# Patient Record
Sex: FEMALE | Race: WHITE | Employment: PART TIME | URBAN - METROPOLITAN AREA
[De-identification: names, ages, dates, MRNs, and addresses within clinical notes are randomized per-mention and may not be internally consistent; named-entity substitution may affect disease eponyms.]

---

## 2017-01-06 ENCOUNTER — HOSPITAL ENCOUNTER (OUTPATIENT)
Dept: CT IMAGING | Age: 42
Discharge: HOME | End: 2017-01-06
Attending: UROLOGY
Payer: SUBSIDIZED

## 2017-01-06 DIAGNOSIS — N20.0 KIDNEY STONES: ICD-10-CM

## 2017-01-06 PROCEDURE — 74176 CT ABD & PELVIS W/O CONTRAST: CPT

## 2017-01-11 ENCOUNTER — ANESTHESIA EVENT (OUTPATIENT)
Dept: SURGERY | Age: 42
End: 2017-01-11
Payer: SUBSIDIZED

## 2017-01-12 ENCOUNTER — SURGERY (OUTPATIENT)
Age: 42
End: 2017-01-12

## 2017-01-12 ENCOUNTER — HOSPITAL ENCOUNTER (EMERGENCY)
Age: 42
Discharge: HOME | End: 2017-01-12
Attending: EMERGENCY MEDICINE
Payer: SUBSIDIZED

## 2017-01-12 ENCOUNTER — ANESTHESIA (OUTPATIENT)
Dept: SURGERY | Age: 42
End: 2017-01-12
Payer: SUBSIDIZED

## 2017-01-12 ENCOUNTER — HOSPITAL ENCOUNTER (OUTPATIENT)
Age: 42
Setting detail: OUTPATIENT SURGERY
Discharge: HOME | End: 2017-01-12
Attending: UROLOGY | Admitting: UROLOGY
Payer: SUBSIDIZED

## 2017-01-12 VITALS
TEMPERATURE: 98.2 F | SYSTOLIC BLOOD PRESSURE: 112 MMHG | BODY MASS INDEX: 26.05 KG/M2 | WEIGHT: 147 LBS | RESPIRATION RATE: 17 BRPM | OXYGEN SATURATION: 98 % | HEART RATE: 78 BPM | DIASTOLIC BLOOD PRESSURE: 62 MMHG | HEIGHT: 63 IN

## 2017-01-12 VITALS
SYSTOLIC BLOOD PRESSURE: 114 MMHG | DIASTOLIC BLOOD PRESSURE: 59 MMHG | HEART RATE: 85 BPM | TEMPERATURE: 98.7 F | OXYGEN SATURATION: 98 % | RESPIRATION RATE: 14 BRPM

## 2017-01-12 DIAGNOSIS — R33.9 URINARY RETENTION: Primary | ICD-10-CM

## 2017-01-12 PROCEDURE — 74011250636 HC RX REV CODE- 250/636

## 2017-01-12 PROCEDURE — C2617 STENT, NON-COR, TEM W/O DEL: HCPCS | Performed by: UROLOGY

## 2017-01-12 PROCEDURE — 77030006974 HC BSKT URET RTVR BSC -C: Performed by: UROLOGY

## 2017-01-12 PROCEDURE — 84132 ASSAY OF SERUM POTASSIUM: CPT

## 2017-01-12 PROCEDURE — 76210000063 HC OR PH I REC FIRST 0.5 HR: Performed by: UROLOGY

## 2017-01-12 PROCEDURE — 77030018832 HC SOL IRR H20 ICUM -A: Performed by: UROLOGY

## 2017-01-12 PROCEDURE — 77030020143 HC AIRWY LARYN INTUB CGAS -A: Performed by: ANESTHESIOLOGY

## 2017-01-12 PROCEDURE — 77030033647: Performed by: UROLOGY

## 2017-01-12 PROCEDURE — 76210000020 HC REC RM PH II FIRST 0.5 HR: Performed by: UROLOGY

## 2017-01-12 PROCEDURE — 99283 EMERGENCY DEPT VISIT LOW MDM: CPT | Performed by: PHYSICIAN ASSISTANT

## 2017-01-12 PROCEDURE — 51702 INSERT TEMP BLADDER CATH: CPT | Performed by: PHYSICIAN ASSISTANT

## 2017-01-12 PROCEDURE — 77030005515 HC CATH URETH FOL14 BARD -B

## 2017-01-12 PROCEDURE — 77030032490 HC SLV COMPR SCD KNE COVD -B: Performed by: UROLOGY

## 2017-01-12 PROCEDURE — 74011250636 HC RX REV CODE- 250/636: Performed by: UROLOGY

## 2017-01-12 PROCEDURE — 76010000138 HC OR TIME 0.5 TO 1 HR: Performed by: UROLOGY

## 2017-01-12 PROCEDURE — 76060000033 HC ANESTHESIA 1 TO 1.5 HR: Performed by: UROLOGY

## 2017-01-12 PROCEDURE — 74011000250 HC RX REV CODE- 250

## 2017-01-12 PROCEDURE — 77030019927 HC TBNG IRR CYSTO BAXT -A: Performed by: UROLOGY

## 2017-01-12 PROCEDURE — 74011250636 HC RX REV CODE- 250/636: Performed by: ANESTHESIOLOGY

## 2017-01-12 PROCEDURE — C1769 GUIDE WIRE: HCPCS | Performed by: UROLOGY

## 2017-01-12 PROCEDURE — 74011000250 HC RX REV CODE- 250: Performed by: UROLOGY

## 2017-01-12 DEVICE — URETERAL STENT
Type: IMPLANTABLE DEVICE | Site: URETER | Status: FUNCTIONAL
Brand: PERCUFLEX™ PLUS

## 2017-01-12 RX ORDER — SODIUM CHLORIDE, SODIUM LACTATE, POTASSIUM CHLORIDE, CALCIUM CHLORIDE 600; 310; 30; 20 MG/100ML; MG/100ML; MG/100ML; MG/100ML
100 INJECTION, SOLUTION INTRAVENOUS CONTINUOUS
Status: DISCONTINUED | OUTPATIENT
Start: 2017-01-12 | End: 2017-01-12 | Stop reason: HOSPADM

## 2017-01-12 RX ORDER — HYDROMORPHONE HYDROCHLORIDE 2 MG/ML
0.5 INJECTION, SOLUTION INTRAMUSCULAR; INTRAVENOUS; SUBCUTANEOUS
Status: DISCONTINUED | OUTPATIENT
Start: 2017-01-12 | End: 2017-01-12 | Stop reason: HOSPADM

## 2017-01-12 RX ORDER — LIDOCAINE HYDROCHLORIDE 20 MG/ML
INJECTION, SOLUTION EPIDURAL; INFILTRATION; INTRACAUDAL; PERINEURAL AS NEEDED
Status: DISCONTINUED | OUTPATIENT
Start: 2017-01-12 | End: 2017-01-12 | Stop reason: HOSPADM

## 2017-01-12 RX ORDER — MIDAZOLAM HYDROCHLORIDE 1 MG/ML
2 INJECTION, SOLUTION INTRAMUSCULAR; INTRAVENOUS ONCE
Status: DISCONTINUED | OUTPATIENT
Start: 2017-01-12 | End: 2017-01-12 | Stop reason: HOSPADM

## 2017-01-12 RX ORDER — OXYCODONE HYDROCHLORIDE 5 MG/1
5 TABLET ORAL
Status: DISCONTINUED | OUTPATIENT
Start: 2017-01-12 | End: 2017-01-12 | Stop reason: HOSPADM

## 2017-01-12 RX ORDER — PHENAZOPYRIDINE HYDROCHLORIDE 200 MG/1
200 TABLET, FILM COATED ORAL
Qty: 30 TAB | Refills: 3 | Status: SHIPPED | OUTPATIENT
Start: 2017-01-12 | End: 2017-01-15

## 2017-01-12 RX ORDER — ONDANSETRON 2 MG/ML
INJECTION INTRAMUSCULAR; INTRAVENOUS AS NEEDED
Status: DISCONTINUED | OUTPATIENT
Start: 2017-01-12 | End: 2017-01-12 | Stop reason: HOSPADM

## 2017-01-12 RX ORDER — HYDROCODONE BITARTRATE AND ACETAMINOPHEN 5; 325 MG/1; MG/1
1-2 TABLET ORAL
Qty: 25 TAB | Refills: 0 | Status: SHIPPED | OUTPATIENT
Start: 2017-01-12 | End: 2017-09-05

## 2017-01-12 RX ORDER — DEXAMETHASONE SODIUM PHOSPHATE 4 MG/ML
INJECTION, SOLUTION INTRA-ARTICULAR; INTRALESIONAL; INTRAMUSCULAR; INTRAVENOUS; SOFT TISSUE AS NEEDED
Status: DISCONTINUED | OUTPATIENT
Start: 2017-01-12 | End: 2017-01-12 | Stop reason: HOSPADM

## 2017-01-12 RX ORDER — FENTANYL CITRATE 50 UG/ML
100 INJECTION, SOLUTION INTRAMUSCULAR; INTRAVENOUS ONCE
Status: DISCONTINUED | OUTPATIENT
Start: 2017-01-12 | End: 2017-01-12 | Stop reason: HOSPADM

## 2017-01-12 RX ORDER — MIDAZOLAM HYDROCHLORIDE 1 MG/ML
2 INJECTION, SOLUTION INTRAMUSCULAR; INTRAVENOUS
Status: DISCONTINUED | OUTPATIENT
Start: 2017-01-12 | End: 2017-01-12 | Stop reason: HOSPADM

## 2017-01-12 RX ORDER — CEFAZOLIN SODIUM IN 0.9 % NACL 2 G/50 ML
2 INTRAVENOUS SOLUTION, PIGGYBACK (ML) INTRAVENOUS ONCE
Status: COMPLETED | OUTPATIENT
Start: 2017-01-12 | End: 2017-01-12

## 2017-01-12 RX ORDER — FENTANYL CITRATE 50 UG/ML
INJECTION, SOLUTION INTRAMUSCULAR; INTRAVENOUS AS NEEDED
Status: DISCONTINUED | OUTPATIENT
Start: 2017-01-12 | End: 2017-01-12 | Stop reason: HOSPADM

## 2017-01-12 RX ORDER — BUPIVACAINE HYDROCHLORIDE 5 MG/ML
INJECTION, SOLUTION EPIDURAL; INTRACAUDAL AS NEEDED
Status: DISCONTINUED | OUTPATIENT
Start: 2017-01-12 | End: 2017-01-12 | Stop reason: HOSPADM

## 2017-01-12 RX ORDER — LIDOCAINE HYDROCHLORIDE 10 MG/ML
0.1 INJECTION INFILTRATION; PERINEURAL AS NEEDED
Status: DISCONTINUED | OUTPATIENT
Start: 2017-01-12 | End: 2017-01-12 | Stop reason: HOSPADM

## 2017-01-12 RX ORDER — PROPOFOL 10 MG/ML
INJECTION, EMULSION INTRAVENOUS AS NEEDED
Status: DISCONTINUED | OUTPATIENT
Start: 2017-01-12 | End: 2017-01-12 | Stop reason: HOSPADM

## 2017-01-12 RX ORDER — GLYCOPYRROLATE 0.2 MG/ML
INJECTION INTRAMUSCULAR; INTRAVENOUS AS NEEDED
Status: DISCONTINUED | OUTPATIENT
Start: 2017-01-12 | End: 2017-01-12 | Stop reason: HOSPADM

## 2017-01-12 RX ADMIN — BUPIVACAINE HYDROCHLORIDE 30 ML: 5 INJECTION, SOLUTION EPIDURAL; INTRACAUDAL; PERINEURAL at 12:06

## 2017-01-12 RX ADMIN — CEFAZOLIN 2 G: 1 INJECTION, POWDER, FOR SOLUTION INTRAMUSCULAR; INTRAVENOUS; PARENTERAL at 11:35

## 2017-01-12 RX ADMIN — PROPOFOL 300 MG: 10 INJECTION, EMULSION INTRAVENOUS at 11:31

## 2017-01-12 RX ADMIN — SODIUM CHLORIDE, SODIUM LACTATE, POTASSIUM CHLORIDE, AND CALCIUM CHLORIDE 100 ML/HR: 600; 310; 30; 20 INJECTION, SOLUTION INTRAVENOUS at 11:00

## 2017-01-12 RX ADMIN — GLYCOPYRROLATE 0.2 MG: 0.2 INJECTION INTRAMUSCULAR; INTRAVENOUS at 11:56

## 2017-01-12 RX ADMIN — DEXAMETHASONE SODIUM PHOSPHATE 10 MG: 4 INJECTION, SOLUTION INTRA-ARTICULAR; INTRALESIONAL; INTRAMUSCULAR; INTRAVENOUS; SOFT TISSUE at 11:37

## 2017-01-12 RX ADMIN — FENTANYL CITRATE 50 MCG: 50 INJECTION, SOLUTION INTRAMUSCULAR; INTRAVENOUS at 11:57

## 2017-01-12 RX ADMIN — ONDANSETRON 4 MG: 2 INJECTION INTRAMUSCULAR; INTRAVENOUS at 12:13

## 2017-01-12 RX ADMIN — LIDOCAINE HYDROCHLORIDE 100 MG: 20 INJECTION, SOLUTION EPIDURAL; INFILTRATION; INTRACAUDAL; PERINEURAL at 11:31

## 2017-01-12 RX ADMIN — FENTANYL CITRATE 50 MCG: 50 INJECTION, SOLUTION INTRAMUSCULAR; INTRAVENOUS at 11:42

## 2017-01-12 NOTE — ED TRIAGE NOTES
Reports had kidney stones removed this AM by Dr. Jose Goel and has not urinated since 10:30. Had stents placed as well.

## 2017-01-12 NOTE — DISCHARGE INSTRUCTIONS
CYSTOSCOPY    ACTIVITY   · As tolerated and as directed by your doctor. · Bathe or shower as directed by your doctor. DIET  · Clear liquids until no nausea or vomiting; then light diet for the first day. · Drink plenty of liquids. At least 8 glasses of water to help flush out bladder. Limit amount of caffeine. · Advance to regular diet on second day, unless your doctor orders otherwise. · If nausea and vomiting continues, call your doctor. PAIN  · Take pain medication as directed by your doctor. · Call your doctor if pain is NOT relieved by medication. · DO NOT take aspirin or blood thinners until directed by your doctor. CALL YOUR DOCTOR IF  · Expect blood-tinged urine. Call your doctor if it lasts more than 72 hours or if you cannot see through the urine. · Temperature of 101 degrees or above. · Unable to empty bladder. AFTER ANESTHESIA  · For the next 24 hours: DO NOT Drive, drink alcoholic beverages, or Make important decisions. · Be aware of dizziness following anesthesia and while taking pain medications    APPOINTMENT DATE/ TIME    YOUR DOCTOR'S PHONE NUMBER      DISCHARGE SUMMARY from Nurse    PATIENT INSTRUCTIONS:    After general anesthesia or intravenous sedation, for 24 hours or while taking prescription Narcotics:  · Limit your activities  · Do not drive and operate hazardous machinery  · Do not make important personal or business decisions  · Do  not drink alcoholic beverages  · If you have not urinated within 8 hours after discharge, please contact your surgeon on call. *  Please give a list of your current medications to your Primary Care Provider. *  Please update this list whenever your medications are discontinued, doses are      changed, or new medications (including over-the-counter products) are added. *  Please carry medication information at all times in case of emergency situations.       These are general instructions for a healthy lifestyle:    No smoking/ No tobacco products/ Avoid exposure to second hand smoke    Surgeon General's Warning:  Quitting smoking now greatly reduces serious risk to your health. Obesity, smoking, and sedentary lifestyle greatly increases your risk for illness    A healthy diet, regular physical exercise & weight monitoring are important for maintaining a healthy lifestyle    You may be retaining fluid if you have a history of heart failure or if you experience any of the following symptoms:  Weight gain of 3 pounds or more overnight or 5 pounds in a week, increased swelling in our hands or feet or shortness of breath while lying flat in bed. Please call your doctor as soon as you notice any of these symptoms; do not wait until your next office visit. Recognize signs and symptoms of STROKE:    F-face looks uneven    A-arms unable to move or move unevenly    S-speech slurred or non-existent    T-time-call 911 as soon as signs and symptoms begin-DO NOT go       Back to bed or wait to see if you get better-TIME IS BRAIN.

## 2017-01-12 NOTE — BRIEF OP NOTE
BRIEF OPERATIVE NOTE    Date of Procedure: 1/12/2017   Preoperative Diagnosis: Ureteral stone [N20.1]  Postoperative Diagnosis: RIGHT URETERAL STONES    Procedure(s):  CYSTOSCOPY / RIGHT URETEROSCOPY/BASKET STONE EXTRACTION / RIGHT URETERAL STENT EXCHANGE  Surgeon(s) and Role:     * Yoana Luz MD - Primary            Surgical Staff:  Circ-1: Esperanza Sanchez RN  Event Time In   Incision Start 1151   Incision Close 1211     Anesthesia: General   Estimated Blood Loss: none  Specimens: * No specimens in log *   Findings: see op note   Complications: none  Implants:   Implant Name Type Inv.  Item Serial No.  Lot No. LRB No. Used Action   Saran AIRTAME [id number] -- [facility] - [id number]   Saran ReelGenie FIRM [id number] -- [facility] 19 [id number] Right 1 Implanted

## 2017-01-12 NOTE — ANESTHESIA POSTPROCEDURE EVALUATION
Post-Anesthesia Evaluation and Assessment    Patient: Evangelist Anderson MRN: [id number]  SSN: [id number]    YOB: 1975  Age: 35 y.o. Sex: female       Cardiovascular Function/Vital Signs  Visit Vitals    /59 (BP 1 Location: Right arm, BP Patient Position: Supine)    Pulse 85    Temp 37.1 °C (98.7 °F)    Resp 14    SpO2 98%       Patient is status post general anesthesia for Procedure(s):  CYSTOSCOPY / RIGHT URETEROSCOPY/BASKET STONE EXTRACTION / RIGHT URETERAL STENT EXCHANGE. Nausea/Vomiting: None    Postoperative hydration reviewed and adequate. Pain:  Pain Scale 1: Numeric (0 - 10) (01/12/17 1303)  Pain Intensity 1: 0 (01/12/17 1303)   Managed    Neurological Status:   Neuro (WDL): Within Defined Limits (01/12/17 1303)   At baseline    Mental Status and Level of Consciousness: Arousable    Pulmonary Status:   O2 Device: Room air (01/12/17 1303)   Adequate oxygenation and airway patent    Complications related to anesthesia: None    Post-anesthesia assessment completed. No concerns  No sore throat.   Signed By: Thao Zendejas MD     January 12, 2017

## 2017-01-12 NOTE — DISCHARGE INSTRUCTIONS
If you should have any change in your symptoms, worsening symptoms, or concerns return to the ER. Leave Dye catheter in place until you are seen by Urology. Urinary Retention: Care Instructions  Your Care Instructions    Urinary retention means that you aren't able to urinate. In men, it is often caused by a blockage of the urinary tract from an enlarged prostate gland. In men and women, it can also be caused by an infection or nerve damage. Or it may be a side effect of a medicine. The doctor may have drained the urine from your bladder. If you still have problems passing urine, you may need to use a catheter at home. This is used to empty your bladder until the problem can be fixed. Your doctor may put a catheter in your bladder before you go home. If so, he or she will tell you when to come back to have the catheter removed. The doctor has checked you closely. But problems can develop later. If you notice any problems or new symptoms, get medical treatment right away. Follow-up care is a key part of your treatment and safety. Be sure to make and go to all appointments, and call your doctor if you are having problems. It's also a good idea to know your test results and keep a list of the medicines you take. How can you care for yourself at home? · Take your medicines exactly as prescribed. Call your doctor if you think you are having a problem with your medicine. You will get more details on the specific medicines your doctor prescribes. · Check with your doctor before you use any over-the-counter medicines. Many cold and allergy medicines, for example, can make this problem worse. Make sure your doctor knows all of the medicines, vitamins, supplements, and herbal remedies you take. · Spread out through the day the amount of fluid you drink. Do not drink a lot at bedtime. · Avoid alcohol and caffeine.   · If you have been given a catheter, or if one is already in place, follow the instructions you were given. Always wash your hands before and after you handle the catheter. When should you call for help? Call your doctor now or seek immediate medical care if:  · You cannot urinate at all, or it is getting harder to urinate. · You have symptoms of a urinary tract infection. These may include:  ¨ Pain or burning when you urinate. ¨ A frequent need to urinate without being able to pass much urine. ¨ Pain in the flank, which is just below the rib cage and above the waist on either side of the back. ¨ Blood in your urine. ¨ A fever. Watch closely for changes in your health, and be sure to contact your doctor if:  · You have any problems with your catheter. · You do not get better as expected. Where can you learn more? Go to http://juju-bertha.info/. Enter M244 in the search box to learn more about \"Urinary Retention: Care Instructions. \"  Current as of: August 12, 2016  Content Version: 11.1  © 8341-6755 [employer]. Care instructions adapted under license by [employer] (which disclaims liability or warranty for this information). If you have questions about a medical condition or this instruction, always ask your healthcare professional. [place] [address] any warranty or liability for your use of this information. Learning About Urinary Catheter Care to Prevent Infection  What is a urinary catheter? A urinary catheter is a flexible plastic tube used to drain urine from your bladder when you can't urinate on your own. The catheter allows urine to drain from the bladder into a bag. Two types of drainage bags may be used with a urinary catheter. · A bedside bag is a large bag that you can hang on the side of your bed or on a chair. You can use it overnight or anytime you will be sitting or lying down for a long time. · A leg bag is a small bag that you can use during the day.  It is usually attached to your thigh or calf and hidden under your clothes. Having a urinary catheter increases your risk of getting a urinary tract infection. Germs may get on the catheter and cause an infection in your bladder or kidneys. The longer you have a catheter, the more likely it is that you will get an infection. You can help prevent this problem with good hygiene and careful handling of your catheter and drainage bags. How can you help prevent infection? Take care to be clean  · Always wash your hands well before and after you handle your catheter. · Clean the skin around the catheter twice a day using soap and water. Dry with a clean towel afterward. You can shower with your catheter and drainage bag in place unless your doctor told you not to. · When you clean around the catheter, check the surrounding skin for signs of infection. Look for things like pus or irritated, swollen, red, or tender skin around the catheter. Be careful with your drainage bag  · Always keep the drainage bag below the level of your bladder. This will help keep urine from flowing back into your bladder. · Check often to see that urine is flowing through the catheter into the drainage bag. · Empty the drainage bag when it is half full. This will keep it from overflowing or backing up. · When you empty the drainage bag, do not let the tubing or drain spout touch anything. Be careful with your catheter  · Do not unhook the catheter from the drain tube. That could let germs get into the tube. · Make sure that the catheter tubing does not get twisted or kinked. · Do not tug or pull on the catheter. And make sure that the drainage bag does not drag or pull on the catheter. · Do not put powder or lotion on the skin around the catheter. · Do not have sexual intercourse while wearing a catheter. How do you empty a urine drainage bag? .  If your doctor has asked you to keep a record, write down the amount of urine in the bag before you empty it.   Wash your hands before and after you touch the bag. 1. Remove the drain spout from its sleeve at the bottom of the drainage bag.  2. Open the valve on the drain spout. Let the urine flow out into the toilet or a container. Be careful not to let the tubing or drain spout touch anything. 3. After you empty the bag, wipe off any liquid on the end of the drain spout. Close the valve. Then put the drain spout back into its sleeve at the bottom of the collection bag. How do you change from a bedside bag to a leg bag? Wash your hands before and after you handle the bags. 1. Empty the bag attached to the catheter. 2. Put a clean towel under the catheter where it connects to the bag.  3. Fold and pinch the catheter closed to keep urine from leaking out. Many catheters have a clip you can use to pinch the tube closed. 4. Remove the used bag from the catheter. 5. Use an alcohol wipe to clean the tip of the leg bag. Then connect the leg bag to the catheter. 6. Strap the leg bag to your thigh or calf. Be sure the straps are not too tight. How can you clean a drainage bag? Clean your bags every day. Many people clean their bedside bag in the morning when they switch to a leg bag. At night, they attach the bedside bag and clean the leg bag. To clean a drainage ba. Remove the bag from the catheter. 2. Fill the bag with 2 parts vinegar and 3 parts water. Let it stand for 20 minutes. 3. Empty the bag, and let it air dry. When should you call for help? Call your doctor now or seek immediate medical care if:  · You have symptoms of a urinary infection. These may include:  ¨ Pain or burning when you urinate. ¨ A frequent need to urinate without being able to pass much urine. ¨ Pain in the flank, which is just below the rib cage and above the waist on either side of the back. ¨ Blood in your urine. ¨ A fever. · Your urine smells bad. · You see large blood clots in your urine.   · No urine or very little urine is flowing into the bag for 4 or more hours. Watch closely for changes in your health, and be sure to contact your doctor if:  · The area around the catheter becomes irritated, swollen, red, or tender, or there is pus draining from it. · Urine is leaking from the place where the catheter enters your body. Follow-up care is a key part of your treatment and safety. Be sure to make and go to all appointments, and call your doctor if you are having problems. It's also a good idea to know your test results and keep a list of the medicines you take. Where can you learn more? Go to http://juju-bertha.info/. Enter C910 in the search box to learn more about \"Learning About Urinary Catheter Care to Prevent Infection. \"  Current as of: August 12, 2016  Content Version: 11.1  © 0225-7075 [employer]. Care instructions adapted under license by [employer] (which disclaims liability or warranty for this information). If you have questions about a medical condition or this instruction, always ask your healthcare professional. [address] any warranty or liability for your use of this information.

## 2017-01-12 NOTE — ED PROVIDER NOTES
HPI Comments: 43-year-old  female with extensive history of ureteral lithiasis and is status post Right ureteroscopy with basket extraction of ureteral calculi with Stent exchange. Patient states procedure was uneventful she has had this performed before but states that she has not been able to urinate since the procedure. Patient states that she had placement of a different type of stent that was secured in place with any straining protruding out her urethra. Patient reports that she does have the urge to void she has just not been able to. She denies any chills, fever, nausea or vomiting. Patient states she did have general anesthesia this morning with the procedure. Patient is a 35 y.o. female presenting with inability to urinate. The history is provided by the patient. Urinary Retention    This is a new problem. The current episode started 6 to 12 hours ago. The problem occurs constantly. The problem has been gradually worsening. Associated with: SURGERY TODAY. The pain is located in the suprapubic region. The quality of the pain is pressure-like. The pain is at a severity of 3/10. The pain is moderate. Pertinent negatives include no fever, no diarrhea, no nausea, no vomiting, no constipation, no dysuria, no frequency and no back pain. Exacerbated by: ATTEMPT AT URINATION. The pain is relieved by nothing. Her past medical history is significant for kidney stones.         Past Medical History:   Diagnosis Date    Difficult intubation      12/21/16 sore throat and ulcers after intubation     Essential hypertension 11/4/2016     only when septic in 10/2016, takes atenolol for HR    GERD (gastroesophageal reflux disease)      controlled by protonix    Hiatal hernia     Ill-defined condition      kidney stones    Ill-defined condition      SVT    Kidney stone     SVT (supraventricular tachycardia)      2003 ablation    Tachycardia 11/4/2016       Past Surgical History:   Procedure Laterality Date    Hx bladder suspension  04/2016    Hx tubal ligation  2006    Hx svt ablation  2003    Fragment kidney stone/ eswl      Pr cardiac surg procedure unlist  2003     ablation    Hx urological  10/2016     lithotripsy and basket extractions multiple         Family History:   Problem Relation Age of Onset    Heart Disease Mother      congenital heart block with lalo    Diabetes Father     Hypertension Father        Social History     Social History    Marital status:      Spouse name: N/A    Number of children: N/A    Years of education: N/A     Occupational History    Not on file. Social History Main Topics    Smoking status: Never Smoker    Smokeless tobacco: Never Used    Alcohol use No    Drug use: No    Sexual activity: Not on file     Other Topics Concern    Not on file     Social History Narrative         ALLERGIES: Ciprofloxacin    Review of Systems   Constitutional: Negative for fever. Gastrointestinal: Negative for constipation, diarrhea, nausea and vomiting. Genitourinary: Positive for decreased urine volume, difficulty urinating and urgency. Negative for dysuria, flank pain and frequency. Musculoskeletal: Negative for back pain. Vitals:    01/12/17 1739   BP: 122/65   Pulse: (!) 106   Resp: 16   SpO2: 96%   Weight: 66.7 kg (147 lb)   Height: 5' 3\" (1.6 m)            Physical Exam   Constitutional: She is oriented to person, place, and time. Vital signs are normal. She appears well-developed and well-nourished. She is active. Non-toxic appearance. She does not appear ill. No distress. Cardiovascular: Normal rate, regular rhythm and normal heart sounds. Exam reveals no gallop and no friction rub. No murmur heard. Pulmonary/Chest: Effort normal and breath sounds normal. No accessory muscle usage. No respiratory distress. She has no decreased breath sounds. She has no wheezes. She has no rhonchi. Abdominal: Soft.  Normal appearance and bowel sounds are normal. She exhibits distension. There is tenderness in the suprapubic area. There is no rigidity, no rebound, no guarding and no CVA tenderness. Neurological: She is alert and oriented to person, place, and time. Skin: Skin is warm and dry. Psychiatric: She has a normal mood and affect. Her behavior is normal.   Nursing note and vitals reviewed. MDM  Number of Diagnoses or Management Options  Diagnosis management comments: Pt. Discussed with Urology attending Dr. Nichole who advised that a Dye catheter may be placed and patient may go to office tomorrow morning to have this removed. Plan discussed with patient and  who is at the bedside. They verbalize agreement to plan and disposition.        Amount and/or Complexity of Data Reviewed  Discuss the patient with other providers: yes    Risk of Complications, Morbidity, and/or Mortality  Presenting problems: moderate  Management options: low    Patient Progress  Patient progress: stable    ED Course       Procedures

## 2017-01-12 NOTE — IP AVS SNAPSHOT
Jose Luis Wang 
 
 
 [address] 
[phone] Patient: Susanne Rossi MRN: [id number] :1975 You are allergic to the following Allergen Reactions Ciprofloxacin Other (comments) \"joint pain\" Recent Documentation OB Status Smoking Status Having regular periods Never Smoker Emergency Contacts Name Discharge Info Relation Home Work Mobile Jason Fiore  Spouse [3] [phone] About your hospitalization You were admitted on:  2017 You last received care in the:  [facility] OP PACU You were discharged on:  2017 Unit phone number:  [phone] Why you were hospitalized Your primary diagnosis was:  Not on File Providers Seen During Your Hospitalizations Provider Role Specialty Primary office phone Rita Jain MD Attending Provider Urology [phone] Your Primary Care Physician (PCP) Primary Care Physician Office Phone Office Fax Dayana Beal [phone] Follow-up Information Follow up With Details Comments Contact Info Teresita Ng MD   [address] [facility] 
[phone] Rita Jain MD Follow up on 2017 2:00 [address] [facility] [address] 
[phone] Your Appointments 2017  2:00 PM EST Office Visit with Bonilla Jean-Baptiste NP [facility] Urology 52 (U [facility] UROLOGY) [address] [address]  
[phone] Current Discharge Medication List  
  
START taking these medications Dose & Instructions Dispensing Information Comments Morning Noon Evening Bedtime  
 phenazopyridine 200 mg tablet Commonly known as:  PYRIDIUM Your next dose is: Today, Tomorrow Other:  _________  Dose:  200 mg  
 Take 1 Tab by mouth three (3) times daily as needed for Pain (for painful urination) for up to 3 days. Quantity:  30 Tab Refills:  3 CONTINUE these medications which have CHANGED Dose & Instructions Dispensing Information Comments Morning Noon Evening Bedtime * HYDROcodone-acetaminophen  mg tablet Commonly known as:  Roderick De Guzman What changed:  Another medication with the same name was added. Make sure you understand how and when to take each. Your next dose is: Today, Tomorrow Other:  _________ Dose:  1 Tab Take 1 Tab by mouth every four (4) hours as needed for Pain. Max Daily Amount: 6 Tabs. Quantity:  20 Tab Refills:  0  
     
   
   
   
  
 * HYDROcodone-acetaminophen 5-325 mg per tablet Commonly known as:  Roderick De Guzman What changed: You were already taking a medication with the same name, and this prescription was added. Make sure you understand how and when to take each. Your next dose is: Today, Tomorrow Other:  _________ Dose:  1-2 Tab Take 1-2 Tabs by mouth every four (4) hours as needed for Pain. Max Daily Amount: 12 Tabs. Quantity:  25 Tab Refills:  0  
     
   
   
   
  
 * Notice: This list has 2 medication(s) that are the same as other medications prescribed for you. Read the directions carefully, and ask your doctor or other care provider to review them with you. CONTINUE these medications which have NOT CHANGED Dose & Instructions Dispensing Information Comments Morning Noon Evening Bedtime  
 atenolol 25 mg tablet Commonly known as:  TENORMIN Your next dose is: Today, Tomorrow Other:  _________ Dose:  25 mg Take 25 mg by mouth daily. Refills:  0  
     
   
   
   
  
 potassium chloride SR 10 mEq tablet Commonly known as:  KLOR-CON 10 Your next dose is: Today, Tomorrow Other:  _________ Dose:  20 mEq Take 20 mEq by mouth daily. Refills:  0 Where to Get Your Medications These medications were sent to [address] [place] [address], [address] [place] [address]  [facility] [address] [address] Phone:  [phone] phenazopyridine 200 mg tablet Information on where to get these meds will be given to you by the nurse or doctor. ! Ask your nurse or doctor about these medications HYDROcodone-acetaminophen 5-325 mg per tablet Discharge Instructions CYSTOSCOPY 
 
ACTIVITY · As tolerated and as directed by your doctor. · Bathe or shower as directed by your doctor. DIET · Clear liquids until no nausea or vomiting; then light diet for the first day. · Drink plenty of liquids. At least 8 glasses of water to help flush out bladder. Limit amount of caffeine. · Advance to regular diet on second day, unless your doctor orders otherwise. · If nausea and vomiting continues, call your doctor. PAIN 
· Take pain medication as directed by your doctor. · Call your doctor if pain is NOT relieved by medication. · DO NOT take aspirin or blood thinners until directed by your doctor. CALL YOUR DOCTOR IF 
· Expect blood-tinged urine. Call your doctor if it lasts more than 72 hours or if you cannot see through the urine. · Temperature of 101 degrees or above. · Unable to empty bladder. AFTER ANESTHESIA · For the next 24 hours: DO NOT Drive, drink alcoholic beverages, or Make important decisions. · Be aware of dizziness following anesthesia and while taking pain medications APPOINTMENT DATE/ TIME 
 
YOUR DOCTOR'S PHONE NUMBER 
 
 
DISCHARGE SUMMARY from Nurse PATIENT INSTRUCTIONS: 
 
After general anesthesia or intravenous sedation, for 24 hours or while taking prescription Narcotics: · Limit your activities · Do not drive and operate hazardous machinery · Do not make important personal or business decisions · Do  not drink alcoholic beverages · If you have not urinated within 8 hours after discharge, please contact your surgeon on call. *  Please give a list of your current medications to your Primary Care Provider. *  Please update this list whenever your medications are discontinued, doses are 
    changed, or new medications (including over-the-counter products) are added. *  Please carry medication information at all times in case of emergency situations. These are general instructions for a healthy lifestyle: No smoking/ No tobacco products/ Avoid exposure to second hand smoke Surgeon General's Warning:  Quitting smoking now greatly reduces serious risk to your health. Obesity, smoking, and sedentary lifestyle greatly increases your risk for illness A healthy diet, regular physical exercise & weight monitoring are important for maintaining a healthy lifestyle You may be retaining fluid if you have a history of heart failure or if you experience any of the following symptoms:  Weight gain of 3 pounds or more overnight or 5 pounds in a week, increased swelling in our hands or feet or shortness of breath while lying flat in bed. Please call your doctor as soon as you notice any of these symptoms; do not wait until your next office visit. Recognize signs and symptoms of STROKE: 
 
F-face looks uneven A-arms unable to move or move unevenly S-speech slurred or non-existent T-time-call 911 as soon as signs and symptoms begin-DO NOT go Back to bed or wait to see if you get better-TIME IS BRAIN. Discharge Orders None Introducing [facility] & HEALTH SERVICES! Stevo Paredes introduces built.io patient portal. Now you can access parts of your medical record, email your doctor's office, and request medication refills online. 1. In your internet browser, go to [email or web address] 2. Click on the First Time User? Click Here link in the Sign In box.  You will see the New Member Sign Up page. 3. Enter your MyScienceWork Access Code exactly as it appears below. You will not need to use this code after youve completed the sign-up process. If you do not sign up before the expiration date, you must request a new code. · MyScienceWork Access Code: [email or web address] Expires: 1/24/2017  4:15 PM 
 
4. Enter the last four digits of your Social Security Number (xxxx) and Date of Birth (mm/dd/yyyy) as indicated and click Submit. You will be taken to the next sign-up page. 5. Create a MyScienceWork ID. This will be your MyScienceWork login ID and cannot be changed, so think of one that is secure and easy to remember. 6. Create a MyScienceWork password. You can change your password at any time. 7. Enter your Password Reset Question and Answer. This can be used at a later time if you forget your password. 8. Enter your e-mail address. You will receive e-mail notification when new information is available in [address] 9. Click Sign Up. You can now view and download portions of your medical record. 10. Click the Download Summary menu link to download a portable copy of your medical information. If you have questions, please visit the Frequently Asked Questions section of the MyScienceWork website. Remember, MyScienceWork is NOT to be used for urgent needs. For medical emergencies, dial 911. Now available from your iPhone and Android! General Information Please provide this summary of care documentation to your next provider. Patient Signature:  ____________________________________________________________ Date:  ____________________________________________________________  
  
[facility] Provider Signature:  ____________________________________________________________ Date:  ____________________________________________________________

## 2017-01-12 NOTE — OP NOTES
[address]   OPERATIVE REPORT       Name:  Beatriz Camejo   MR#:  [id number]   :  1975   Account #:  [de-identified]   Date of Adm:  2017       DATE OF PROCEDURE: 2017    PREOPERATIVE DIAGNOSIS: Right ureteral calculi. POSTOPERATIVE DIAGNOSIS: Right ureteral calculi. PROCEDURE:   1. Right ureteroscopy with basket extraction of ureteral   calculi. 2. Stent exchange. SURGEON: Hilario Doty. Virginia Solis MD    FINDINGS: Multiple stones in the right mid ureter with the   largest measuring about 4 mm in diameter. DESCRIPTION OF PROCEDURE: The patient was given a general   anesthetic, placed in the dorsal lithotomy position. She was   prepped and draped in sterile fashion. Urethroscopy was   performed and showed a well-placed double-J stent on the right   side. No evidence of any stones. A 23-Khmer cystoscope was advanced into the urethra using the   video camera and 30 degree lens. The stent was seen protruding   from the right ureteral orifice. The grasping forceps was used   to grasp the end of the stent, it was pulled out of the urethral   meatus. Using fluoroscopy, a 0.038 floppy tip guidewire was then   passed up the right ureter through the lumen of the stent. The   stent was removed, leaving the guidewire in place. A 6.5-Khmer semi-rigid ureteroscope was passed alongside the   guidewire. In the right mid ureter at the inferior border of the   sacroiliac joint, there appears to be several stones. The   largest stone measures about 4 mm in diameter and there were   several other smaller stones. I used a filiform tipped wire basket to attempt to remove the   largest stone. Initially all the stones became contained within   the basket and there was some resistance initially to removing   the basket. I carefully tried to maneuver the basket, so that   the stones would come out of the basket.  At this point, made   preparations to perform laser lithotripsy because the basket   could not initially be removed. While obtaining the laser fiber, I was able to then twist the   basket. At this point, the basket with the contained stones was   able to be removed from the ureter without any trauma. There   were about 4 stones with the largest 4 mm, the other ones were   about 1-2 mm in diameter. I ran the ureteroscope back up the ureter, the wire was in good   position. There was some mild edema of the right mid ureter   where the stones were, but no evidence of any mucosal trauma. At   this point, the wire was backloaded into the cystoscope and a 7-  [place] 24-cm long double-J stent was passed successfully and   left in good position. Suture was left attached to the distal   end of the stent and left protruding from the urethral meatus. We instilled Marcaine into the bladder prior to removing the   scope. The patient tolerated this well. PLAN: She will be discharged home, return to the office in 1   week for KUB and stent removal by the nurse practitioner. The   patient has a stent with suture.         MD Mercy Baeza / Milagros Gastelum   D:  01/12/2017   12:32   T:  01/12/2017   13:10   Job #:  [id number]

## 2017-01-12 NOTE — PERIOP NOTES
Discharge instructions given to spouse. Verbalized understanding and opportunity for questions was given. Dr Ford Fails okay to discharge at this time. Pt and belongings taken via wheelchair to car.

## 2017-01-12 NOTE — ANESTHESIA PREPROCEDURE EVALUATION
Anesthetic History     Increased risk of difficult airway          Review of Systems / Medical History  Patient summary reviewed, nursing notes reviewed and pertinent labs reviewed    Pulmonary  Within defined limits                 Neuro/Psych   Within defined limits           Cardiovascular    Hypertension        Dysrhythmias : SVT      Exercise tolerance: >4 METS  Comments: SVT ablation about 13 years ago   GI/Hepatic/Renal     GERD: well controlled    Renal disease: stones  Hiatal hernia (asymptomatic)     Endo/Other  Within defined limits           Other Findings            Physical Exam    Airway  Mallampati: II  TM Distance: 4 - 6 cm  Neck ROM: normal range of motion   Mouth opening: Normal     Cardiovascular    Rhythm: regular           Dental  No notable dental hx       Pulmonary  Breath sounds clear to auscultation               Abdominal         Other Findings            Anesthetic Plan    ASA: 2  Anesthesia type: general          Induction: Intravenous  Anesthetic plan and risks discussed with: Patient and Spouse      Several GAs with LMA in past.  After surgery 12/16, pt noted bruising and ulcers in back of throat. No mention of any problems. Will be especially gentle today.

## 2017-01-13 LAB — POTASSIUM BLD-SCNC: 3.9 MMOL/L (ref 3.5–5.1)

## 2017-01-13 NOTE — ED NOTES
I have reviewed discharge instructions with the patient. The patient verbalized understanding. No questions when given opportunity to ask. Patient Ambulatory out of ED with steady gait in NAD with . esign not available.

## 2017-09-13 ENCOUNTER — APPOINTMENT (OUTPATIENT)
Dept: CT IMAGING | Age: 42
DRG: 872 | End: 2017-09-13
Attending: EMERGENCY MEDICINE
Payer: SUBSIDIZED

## 2017-09-13 ENCOUNTER — HOSPITAL ENCOUNTER (INPATIENT)
Age: 42
LOS: 5 days | Discharge: HOME | DRG: 872 | End: 2017-09-18
Attending: EMERGENCY MEDICINE | Admitting: UROLOGY
Payer: SUBSIDIZED

## 2017-09-13 DIAGNOSIS — N10 ACUTE PYELONEPHRITIS: ICD-10-CM

## 2017-09-13 DIAGNOSIS — N13.2 URETERAL STONE WITH HYDRONEPHROSIS: Primary | ICD-10-CM

## 2017-09-13 PROBLEM — N20.1 LEFT URETERAL STONE: Status: ACTIVE | Noted: 2017-09-13

## 2017-09-13 LAB
ALBUMIN SERPL-MCNC: 3.8 G/DL (ref 3.5–5)
ALBUMIN/GLOB SERPL: 0.9 {RATIO} (ref 1.2–3.5)
ALP SERPL-CCNC: 102 U/L (ref 50–136)
ALT SERPL-CCNC: 20 U/L (ref 12–65)
ANION GAP SERPL CALC-SCNC: 10 MMOL/L (ref 7–16)
AST SERPL-CCNC: 16 U/L (ref 15–37)
BACTERIA URNS QL MICRO: ABNORMAL /HPF
BASOPHILS # BLD: 0 K/UL (ref 0–0.2)
BASOPHILS NFR BLD: 0 % (ref 0–2)
BILIRUB SERPL-MCNC: 0.5 MG/DL (ref 0.2–1.1)
BUN SERPL-MCNC: 19 MG/DL (ref 6–23)
CALCIUM SERPL-MCNC: 9.3 MG/DL (ref 8.3–10.4)
CASTS URNS QL MICRO: 0 /LPF
CHLORIDE SERPL-SCNC: 103 MMOL/L (ref 98–107)
CO2 SERPL-SCNC: 24 MMOL/L (ref 21–32)
CREAT SERPL-MCNC: 1.39 MG/DL (ref 0.6–1)
CRYSTALS URNS QL MICRO: ABNORMAL /LPF
DIFFERENTIAL METHOD BLD: ABNORMAL
EOSINOPHIL # BLD: 0 K/UL (ref 0–0.8)
EOSINOPHIL NFR BLD: 0 % (ref 0.5–7.8)
EPI CELLS #/AREA URNS HPF: ABNORMAL /HPF
ERYTHROCYTE [DISTWIDTH] IN BLOOD BY AUTOMATED COUNT: 12.2 % (ref 11.9–14.6)
GLOBULIN SER CALC-MCNC: 4.3 G/DL (ref 2.3–3.5)
GLUCOSE SERPL-MCNC: 116 MG/DL (ref 65–100)
HCG UR QL: NEGATIVE
HCT VFR BLD AUTO: 39.5 % (ref 35.8–46.3)
HGB BLD-MCNC: 14 G/DL (ref 11.7–15.4)
IMM GRANULOCYTES # BLD: 0 K/UL (ref 0–0.5)
IMM GRANULOCYTES NFR BLD: 0.2 % (ref 0–5)
LACTATE BLD-SCNC: 1.4 MMOL/L (ref 0.5–1.9)
LYMPHOCYTES # BLD: 0.5 K/UL (ref 0.5–4.6)
LYMPHOCYTES NFR BLD: 3 % (ref 13–44)
MCH RBC QN AUTO: 31.3 PG (ref 26.1–32.9)
MCHC RBC AUTO-ENTMCNC: 35.4 G/DL (ref 31.4–35)
MCV RBC AUTO: 88.4 FL (ref 79.6–97.8)
MONOCYTES # BLD: 1 K/UL (ref 0.1–1.3)
MONOCYTES NFR BLD: 7 % (ref 4–12)
MUCOUS THREADS URNS QL MICRO: 0 /LPF
NEUTS SEG # BLD: 13 K/UL (ref 1.7–8.2)
NEUTS SEG NFR BLD: 90 % (ref 43–78)
OTHER OBSERVATIONS,UCOM: ABNORMAL
PLATELET # BLD AUTO: 205 K/UL (ref 150–450)
PMV BLD AUTO: 10.4 FL (ref 10.8–14.1)
POTASSIUM SERPL-SCNC: 3.7 MMOL/L (ref 3.5–5.1)
PROCALCITONIN SERPL-MCNC: <0.1 NG/ML
PROT SERPL-MCNC: 8.1 G/DL (ref 6.3–8.2)
RBC # BLD AUTO: 4.47 M/UL (ref 4.05–5.25)
RBC #/AREA URNS HPF: ABNORMAL /HPF
SODIUM SERPL-SCNC: 137 MMOL/L (ref 136–145)
WBC # BLD AUTO: 14.6 K/UL (ref 4.3–11.1)
WBC URNS QL MICRO: ABNORMAL /HPF

## 2017-09-13 PROCEDURE — 80053 COMPREHEN METABOLIC PANEL: CPT | Performed by: EMERGENCY MEDICINE

## 2017-09-13 PROCEDURE — 87077 CULTURE AEROBIC IDENTIFY: CPT | Performed by: EMERGENCY MEDICINE

## 2017-09-13 PROCEDURE — 87040 BLOOD CULTURE FOR BACTERIA: CPT | Performed by: EMERGENCY MEDICINE

## 2017-09-13 PROCEDURE — 74011250637 HC RX REV CODE- 250/637: Performed by: EMERGENCY MEDICINE

## 2017-09-13 PROCEDURE — 83605 ASSAY OF LACTIC ACID: CPT

## 2017-09-13 PROCEDURE — 99284 EMERGENCY DEPT VISIT MOD MDM: CPT | Performed by: EMERGENCY MEDICINE

## 2017-09-13 PROCEDURE — 96361 HYDRATE IV INFUSION ADD-ON: CPT | Performed by: EMERGENCY MEDICINE

## 2017-09-13 PROCEDURE — 81015 MICROSCOPIC EXAM OF URINE: CPT | Performed by: EMERGENCY MEDICINE

## 2017-09-13 PROCEDURE — 87186 SC STD MICRODIL/AGAR DIL: CPT | Performed by: EMERGENCY MEDICINE

## 2017-09-13 PROCEDURE — 96375 TX/PRO/DX INJ NEW DRUG ADDON: CPT | Performed by: EMERGENCY MEDICINE

## 2017-09-13 PROCEDURE — 87086 URINE CULTURE/COLONY COUNT: CPT | Performed by: EMERGENCY MEDICINE

## 2017-09-13 PROCEDURE — 65270000029 HC RM PRIVATE

## 2017-09-13 PROCEDURE — 81025 URINE PREGNANCY TEST: CPT | Performed by: EMERGENCY MEDICINE

## 2017-09-13 PROCEDURE — 74011250636 HC RX REV CODE- 250/636: Performed by: EMERGENCY MEDICINE

## 2017-09-13 PROCEDURE — 87205 SMEAR GRAM STAIN: CPT | Performed by: EMERGENCY MEDICINE

## 2017-09-13 PROCEDURE — 96365 THER/PROPH/DIAG IV INF INIT: CPT | Performed by: EMERGENCY MEDICINE

## 2017-09-13 PROCEDURE — 87088 URINE BACTERIA CULTURE: CPT | Performed by: EMERGENCY MEDICINE

## 2017-09-13 PROCEDURE — 85025 COMPLETE CBC W/AUTO DIFF WBC: CPT | Performed by: EMERGENCY MEDICINE

## 2017-09-13 PROCEDURE — 74011000258 HC RX REV CODE- 258: Performed by: EMERGENCY MEDICINE

## 2017-09-13 PROCEDURE — 84145 PROCALCITONIN (PCT): CPT | Performed by: EMERGENCY MEDICINE

## 2017-09-13 PROCEDURE — 74176 CT ABD & PELVIS W/O CONTRAST: CPT

## 2017-09-13 RX ORDER — ACETAMINOPHEN 500 MG
1000 TABLET ORAL
Status: COMPLETED | OUTPATIENT
Start: 2017-09-13 | End: 2017-09-13

## 2017-09-13 RX ORDER — ONDANSETRON 2 MG/ML
4 INJECTION INTRAMUSCULAR; INTRAVENOUS
Status: COMPLETED | OUTPATIENT
Start: 2017-09-13 | End: 2017-09-13

## 2017-09-13 RX ORDER — HYDROMORPHONE HYDROCHLORIDE 1 MG/ML
1 INJECTION, SOLUTION INTRAMUSCULAR; INTRAVENOUS; SUBCUTANEOUS
Status: COMPLETED | OUTPATIENT
Start: 2017-09-13 | End: 2017-09-13

## 2017-09-13 RX ADMIN — ONDANSETRON 4 MG: 2 INJECTION INTRAMUSCULAR; INTRAVENOUS at 21:25

## 2017-09-13 RX ADMIN — ACETAMINOPHEN 1000 MG: 500 TABLET, FILM COATED ORAL at 20:40

## 2017-09-13 RX ADMIN — HYDROMORPHONE HYDROCHLORIDE 1 MG: 1 INJECTION, SOLUTION INTRAMUSCULAR; INTRAVENOUS; SUBCUTANEOUS at 21:24

## 2017-09-13 RX ADMIN — CEFTRIAXONE SODIUM 1 G: 1 INJECTION, POWDER, FOR SOLUTION INTRAMUSCULAR; INTRAVENOUS at 22:28

## 2017-09-13 RX ADMIN — SODIUM CHLORIDE 1000 ML: 900 INJECTION, SOLUTION INTRAVENOUS at 21:24

## 2017-09-13 NOTE — ED TRIAGE NOTES
Pt complains of left flank pain. States she was seen by urology on Tuesday. States she called PCP and told to come to ED.  Pt is febrile in triage

## 2017-09-13 NOTE — IP AVS SNAPSHOT
Brittni Wright 
 
 
 [facility] 
[phone] Patient: Chanda Campuzano MRN: [id number] :1975 You are allergic to the following Allergen Reactions Ciprofloxacin Other (comments) \"joint pain\" Immunizations Administered for This Admission Name Date Influenza Vaccine (Quad) PF  Deferred () Recent Documentation Height Weight BMI OB Status Smoking Status 1.6 m 68.9 kg 26.93 kg/m2 Having regular periods Never Smoker Unresulted Labs Order Current Status CULTURE, BLOOD Preliminary result CULTURE, BLOOD Preliminary result Emergency Contacts Name Discharge Info Relation Home Work Mobile Jason Fiore  Spouse [3] [phone] About your hospitalization You were [facility] on:  2017 [facility] You were discharged on:  2017 Unit phone number:  [phone] Why you were hospitalized Your primary diagnosis was:  Left Ureteral Stone Providers Seen During Your Hospitalizations Provider Role Specialty Primary office phone Reggie Staples MD Attending Provider Emergency Medicine [phone] Jacob Irvin MD Attending Provider Urology [phone] Your Primary Care Physician (PCP) Primary Care Physician Office Phone Office Fax Marge Plump [phone] Follow-up Information Follow up With Details Comments Contact Info Carolann Cagle NP On 2017 at 2:20 [address] [facility] [address] [place] [address] 
[phone] Cira Todd MD   [address] [place] [phone] [address] [place] [address]  will manage PICC line [facility] [address] [facility] [address] [phone] INTRAMED PLUS  for IV antibiotics [facility] [address] 
 Jenny [facility] [address] 
[phone] Patricia Chatman MD  office will call you [address] [place] [address] 
[phone] Current Discharge Medication List  
  
ASK your doctor about these medications Dose & Instructions Dispensing Information Comments Morning Noon Evening Bedtime  
 atenolol 25 mg tablet Commonly known as:  TENORMIN Your next dose is:  9/19 Dose:  25 mg Take 25 mg by mouth daily. Refills:  0  
     
   
   
   
  
 buPROPion  mg SR tablet Commonly known as:  Lavella Bugler Your next dose is:  9/19 Dose:  150 mg Take 150 mg by mouth daily. Refills:  0  
     
   
   
   
  
 potassium chloride SR 10 mEq tablet Commonly known as:  KLOR-CON 10 Your next dose is:  9/19 Dose:  20 mEq Take 20 mEq by mouth daily. Refills:  0 Discharge Instructions DISCHARGE SUMMARY from Nurse The following personal items are in your possession at time of discharge: 
 
Dental Appliances: None Home Medications: None Jewelry: None Clothing: At bedside, Footwear, Robe, [address], [address] Other Valuables: Cell Phone PATIENT INSTRUCTIONS: 
 
 
F-face looks uneven A-arms unable to move or move unevenly S-speech slurred or non-existent T-time-call 911 as soon as signs and symptoms begin-DO NOT go Back to bed or wait to see if you get better-TIME IS BRAIN. Warning Signs of HEART ATTACK Call 911 if you have these symptoms: 
? Chest discomfort.  Most heart attacks involve discomfort in the center of the chest that lasts more than a few minutes, or that goes away and comes back. It can feel like uncomfortable pressure, squeezing, fullness, or pain. ? Discomfort in other areas of the upper body. Symptoms can include pain or discomfort in one or both arms, the back, neck, jaw, or stomach. ? Shortness of breath with or without chest discomfort. ? Other signs may include breaking out in a cold sweat, nausea, or lightheadedness. Don't wait more than five minutes to call [address]! Fast action can save your life. Calling 911 is almost always the fastest way to get lifesaving treatment. Emergency Medical Services staff can begin treatment when they arrive  up to an hour sooner than if someone gets to the hospital by car. The discharge information has been reviewed with the patient. The patient verbalized understanding. Discharge medications reviewed with the patient and appropriate educational materials and side effects teaching were provided. Discharge Instructions Attachments/References SEPSIS (ENGLISH) Discharge Orders None Introducing [facility] & HEALTH SERVICES! Dear Sahil Marie: 
Thank you for requesting a ROBAUTO account. Our records indicate that you have previously registered for a ROBAUTO account but its currently inactive. Please call our ROBAUTO support line at [phone]. Additional Information If you have questions, please visit the Frequently Asked Questions section of the ROBAUTO website at [email or web address] Remember, ROBAUTO is NOT to be used for urgent needs. For medical emergencies, dial 911. Now available from your iPhone and Android! General Information Please provide this summary of care documentation to your next provider. Patient Signature:  ____________________________________________________________ Date:  ____________________________________________________________ `    
    
 Provider Signature:  ____________________________________________________________ Date:  ____________________________________________________________ More Information Sepsis: Care Instructions Your Care Instructions Sepsis is an infection that has spread throughout your body. It is a life-threatening condition and often causes extremely low blood pressure. This can lead to problems with many different organs. The cause of sepsis is not always clear, but it can happen as part of a long-term or sudden illness. Sometimes even a mild illness can lead to sepsis. Follow-up care is a key part of your treatment and safety. Be sure to make and go to all appointments, and call your doctor if you are having problems. Its also a good idea to know your test results and keep a list of the medicines you take. How can you care for yourself at home? · If your doctor prescribed antibiotics, take them as directed. Do not stop taking them just because you feel better. You need to take the full course of antibiotics. · Drink plenty of fluids, enough so that your urine is light yellow or clear like water. Choose water or caffeine-free clear liquids until you feel better. If you have kidney, heart, or liver disease and have to limit fluids, talk with your doctor before you increase your fluid intake. You can try rehydration drinks, such as Gatorade or Powerade. · Do not drink alcohol. · Eat a healthy diet. Include fruits, vegetables, and whole grains in your diet every day. · Walking is an easy way to get exercise. Gradually increase the amount you walk every day. Make sure your doctor knows that you are starting an exercise program. 
· Do not smoke or use other tobacco products. If you need help quitting, talk to your doctor about stop-smoking programs and medicines. These can increase your chances of quitting for good. When should you call for help? Call 911 anytime you think you may need emergency care. For example, call if: 
· You passed out (lost consciousness). Call your doctor now or seek immediate medical care if: 
· You have a fever or chills. · You have cool, pale, or clammy skin. · You are dizzy or lightheaded, or you feel like you may faint. · You have any new symptoms, such as a cough, pain in one part of your body, or urinary problems. Watch closely for changes in your health, and be sure to contact your doctor if: 
· You do not get better as expected. Where can you learn more? Go to http://juju-bertha.info/. Enter Q138 in the search box to learn more about \"Sepsis: Care Instructions. \" Current as of: March 20, 2017 Content Version: 11.3 © 3848-4136 Piccsy. Care instructions adapted under license by [employer] (which disclaims liability or warranty for this information). If you have questions about a medical condition or this instruction, always ask your healthcare professional. [address] any warranty or liability for your use of this information.

## 2017-09-14 ENCOUNTER — ANESTHESIA (OUTPATIENT)
Dept: SURGERY | Age: 42
DRG: 872 | End: 2017-09-14
Payer: SUBSIDIZED

## 2017-09-14 ENCOUNTER — ANESTHESIA EVENT (OUTPATIENT)
Dept: SURGERY | Age: 42
DRG: 872 | End: 2017-09-14
Payer: SUBSIDIZED

## 2017-09-14 LAB
ANION GAP SERPL CALC-SCNC: 10 MMOL/L (ref 7–16)
BUN SERPL-MCNC: 18 MG/DL (ref 6–23)
CALCIUM SERPL-MCNC: 8.3 MG/DL (ref 8.3–10.4)
CHLORIDE SERPL-SCNC: 106 MMOL/L (ref 98–107)
CO2 SERPL-SCNC: 21 MMOL/L (ref 21–32)
CREAT SERPL-MCNC: 1.51 MG/DL (ref 0.6–1)
ERYTHROCYTE [DISTWIDTH] IN BLOOD BY AUTOMATED COUNT: 12.2 % (ref 11.9–14.6)
GLUCOSE SERPL-MCNC: 177 MG/DL (ref 65–100)
HCT VFR BLD AUTO: 31.7 % (ref 35.8–46.3)
HGB BLD-MCNC: 11 G/DL (ref 11.7–15.4)
MAGNESIUM SERPL-MCNC: 1.5 MG/DL (ref 1.8–2.4)
MCH RBC QN AUTO: 30.8 PG (ref 26.1–32.9)
MCHC RBC AUTO-ENTMCNC: 34.7 G/DL (ref 31.4–35)
MCV RBC AUTO: 88.8 FL (ref 79.6–97.8)
PLATELET # BLD AUTO: 158 K/UL (ref 150–450)
PMV BLD AUTO: 10.3 FL (ref 10.8–14.1)
POTASSIUM SERPL-SCNC: 3.4 MMOL/L (ref 3.5–5.1)
RBC # BLD AUTO: 3.57 M/UL (ref 4.05–5.25)
SODIUM SERPL-SCNC: 137 MMOL/L (ref 136–145)
WBC # BLD AUTO: 22.2 K/UL (ref 4.3–11.1)

## 2017-09-14 PROCEDURE — 77030018846 HC SOL IRR STRL H20 ICUM -A: Performed by: UROLOGY

## 2017-09-14 PROCEDURE — 83735 ASSAY OF MAGNESIUM: CPT | Performed by: UROLOGY

## 2017-09-14 PROCEDURE — 87086 URINE CULTURE/COLONY COUNT: CPT | Performed by: UROLOGY

## 2017-09-14 PROCEDURE — 36415 COLL VENOUS BLD VENIPUNCTURE: CPT | Performed by: UROLOGY

## 2017-09-14 PROCEDURE — C2617 STENT, NON-COR, TEM W/O DEL: HCPCS | Performed by: UROLOGY

## 2017-09-14 PROCEDURE — 85027 COMPLETE CBC AUTOMATED: CPT | Performed by: UROLOGY

## 2017-09-14 PROCEDURE — 77030032490 HC SLV COMPR SCD KNE COVD -B: Performed by: UROLOGY

## 2017-09-14 PROCEDURE — 76010000093 HC SPECIAL PROCEDURE: Performed by: UROLOGY

## 2017-09-14 PROCEDURE — 77030007880 HC KT SPN EPDRL BBMI -B: Performed by: ANESTHESIOLOGY

## 2017-09-14 PROCEDURE — 77030034849: Performed by: UROLOGY

## 2017-09-14 PROCEDURE — 74011250636 HC RX REV CODE- 250/636

## 2017-09-14 PROCEDURE — C1769 GUIDE WIRE: HCPCS | Performed by: UROLOGY

## 2017-09-14 PROCEDURE — 74011250636 HC RX REV CODE- 250/636: Performed by: UROLOGY

## 2017-09-14 PROCEDURE — 77030019927 HC TBNG IRR CYSTO BAXT -A: Performed by: UROLOGY

## 2017-09-14 PROCEDURE — 87088 URINE BACTERIA CULTURE: CPT | Performed by: UROLOGY

## 2017-09-14 PROCEDURE — 87186 SC STD MICRODIL/AGAR DIL: CPT | Performed by: UROLOGY

## 2017-09-14 PROCEDURE — 65270000029 HC RM PRIVATE

## 2017-09-14 PROCEDURE — 0T778DZ DILATION OF LEFT URETER WITH INTRALUMINAL DEVICE, VIA NATURAL OR ARTIFICIAL OPENING ENDOSCOPIC: ICD-10-PCS | Performed by: UROLOGY

## 2017-09-14 PROCEDURE — 74011250637 HC RX REV CODE- 250/637: Performed by: ANESTHESIOLOGY

## 2017-09-14 PROCEDURE — 77030003665 HC NDL SPN BBMI -A: Performed by: ANESTHESIOLOGY

## 2017-09-14 PROCEDURE — 76060000031 HC ANESTHESIA FIRST 0.5 HR: Performed by: UROLOGY

## 2017-09-14 PROCEDURE — 74011250636 HC RX REV CODE- 250/636: Performed by: INTERNAL MEDICINE

## 2017-09-14 PROCEDURE — 76210000006 HC OR PH I REC 0.5 TO 1 HR: Performed by: UROLOGY

## 2017-09-14 PROCEDURE — 74011250637 HC RX REV CODE- 250/637: Performed by: UROLOGY

## 2017-09-14 PROCEDURE — 77030018832 HC SOL IRR H20 ICUM -A: Performed by: UROLOGY

## 2017-09-14 PROCEDURE — 74011000258 HC RX REV CODE- 258: Performed by: UROLOGY

## 2017-09-14 PROCEDURE — 80048 BASIC METABOLIC PNL TOTAL CA: CPT | Performed by: UROLOGY

## 2017-09-14 DEVICE — URETERAL STENT
Type: IMPLANTABLE DEVICE | Site: URETER | Status: FUNCTIONAL
Brand: PERCUFLEX™ PLUS

## 2017-09-14 RX ORDER — IBUPROFEN 600 MG/1
600 TABLET ORAL ONCE
Status: COMPLETED | OUTPATIENT
Start: 2017-09-14 | End: 2017-09-14

## 2017-09-14 RX ORDER — HYDROMORPHONE HYDROCHLORIDE 1 MG/ML
1 INJECTION, SOLUTION INTRAMUSCULAR; INTRAVENOUS; SUBCUTANEOUS
Status: DISCONTINUED | OUTPATIENT
Start: 2017-09-14 | End: 2017-09-18 | Stop reason: HOSPADM

## 2017-09-14 RX ORDER — IBUPROFEN 800 MG/1
800 TABLET ORAL
Status: DISCONTINUED | OUTPATIENT
Start: 2017-09-14 | End: 2017-09-18 | Stop reason: HOSPADM

## 2017-09-14 RX ORDER — MIDAZOLAM HYDROCHLORIDE 1 MG/ML
2 INJECTION, SOLUTION INTRAMUSCULAR; INTRAVENOUS
Status: CANCELLED | OUTPATIENT
Start: 2017-09-14

## 2017-09-14 RX ORDER — SODIUM CHLORIDE, SODIUM LACTATE, POTASSIUM CHLORIDE, CALCIUM CHLORIDE 600; 310; 30; 20 MG/100ML; MG/100ML; MG/100ML; MG/100ML
INJECTION, SOLUTION INTRAVENOUS
Status: DISCONTINUED | OUTPATIENT
Start: 2017-09-14 | End: 2017-09-14 | Stop reason: HOSPADM

## 2017-09-14 RX ORDER — SODIUM CHLORIDE, SODIUM LACTATE, POTASSIUM CHLORIDE, CALCIUM CHLORIDE 600; 310; 30; 20 MG/100ML; MG/100ML; MG/100ML; MG/100ML
100 INJECTION, SOLUTION INTRAVENOUS CONTINUOUS
Status: DISCONTINUED | OUTPATIENT
Start: 2017-09-14 | End: 2017-09-14 | Stop reason: HOSPADM

## 2017-09-14 RX ORDER — MIDAZOLAM HYDROCHLORIDE 1 MG/ML
2 INJECTION, SOLUTION INTRAMUSCULAR; INTRAVENOUS ONCE
Status: CANCELLED | OUTPATIENT
Start: 2017-09-14 | End: 2017-09-14

## 2017-09-14 RX ORDER — DOCUSATE SODIUM 100 MG/1
100 CAPSULE, LIQUID FILLED ORAL 2 TIMES DAILY
Status: DISCONTINUED | OUTPATIENT
Start: 2017-09-14 | End: 2017-09-18 | Stop reason: HOSPADM

## 2017-09-14 RX ORDER — VANCOMYCIN HYDROCHLORIDE
1250 EVERY 24 HOURS
Status: DISCONTINUED | OUTPATIENT
Start: 2017-09-14 | End: 2017-09-15

## 2017-09-14 RX ORDER — ONDANSETRON 2 MG/ML
4 INJECTION INTRAMUSCULAR; INTRAVENOUS
Status: DISCONTINUED | OUTPATIENT
Start: 2017-09-14 | End: 2017-09-18 | Stop reason: HOSPADM

## 2017-09-14 RX ORDER — ACETAMINOPHEN 325 MG/1
650 TABLET ORAL
Status: DISCONTINUED | OUTPATIENT
Start: 2017-09-14 | End: 2017-09-18 | Stop reason: HOSPADM

## 2017-09-14 RX ORDER — DIPHENHYDRAMINE HYDROCHLORIDE 50 MG/ML
12.5 INJECTION, SOLUTION INTRAMUSCULAR; INTRAVENOUS
Status: DISCONTINUED | OUTPATIENT
Start: 2017-09-14 | End: 2017-09-14 | Stop reason: HOSPADM

## 2017-09-14 RX ORDER — BUPROPION HYDROCHLORIDE 150 MG/1
150 TABLET, EXTENDED RELEASE ORAL
COMMUNITY
End: 2018-06-22

## 2017-09-14 RX ORDER — CHLOROPROCAINE HYDROCHLORIDE 30 MG/ML
INJECTION, SOLUTION EPIDURAL; INFILTRATION; INTRACAUDAL; PERINEURAL AS NEEDED
Status: DISCONTINUED | OUTPATIENT
Start: 2017-09-14 | End: 2017-09-14 | Stop reason: HOSPADM

## 2017-09-14 RX ORDER — ONDANSETRON 2 MG/ML
4 INJECTION INTRAMUSCULAR; INTRAVENOUS ONCE
Status: DISCONTINUED | OUTPATIENT
Start: 2017-09-14 | End: 2017-09-14 | Stop reason: HOSPADM

## 2017-09-14 RX ORDER — POTASSIUM CHLORIDE 20 MEQ/1
20 TABLET, EXTENDED RELEASE ORAL 2 TIMES DAILY
Status: COMPLETED | OUTPATIENT
Start: 2017-09-14 | End: 2017-09-14

## 2017-09-14 RX ORDER — HYDROCODONE BITARTRATE AND ACETAMINOPHEN 5; 325 MG/1; MG/1
1 TABLET ORAL
Status: DISCONTINUED | OUTPATIENT
Start: 2017-09-14 | End: 2017-09-18 | Stop reason: HOSPADM

## 2017-09-14 RX ORDER — OXYCODONE HYDROCHLORIDE 5 MG/1
5 TABLET ORAL
Status: DISCONTINUED | OUTPATIENT
Start: 2017-09-14 | End: 2017-09-14 | Stop reason: HOSPADM

## 2017-09-14 RX ORDER — FENTANYL CITRATE 50 UG/ML
100 INJECTION, SOLUTION INTRAMUSCULAR; INTRAVENOUS ONCE
Status: CANCELLED | OUTPATIENT
Start: 2017-09-14 | End: 2017-09-14

## 2017-09-14 RX ORDER — IBUPROFEN 400 MG/1
400 TABLET ORAL ONCE
Status: COMPLETED | OUTPATIENT
Start: 2017-09-14 | End: 2017-09-14

## 2017-09-14 RX ORDER — ATENOLOL 50 MG/1
25 TABLET ORAL DAILY
Status: DISCONTINUED | OUTPATIENT
Start: 2017-09-14 | End: 2017-09-18 | Stop reason: HOSPADM

## 2017-09-14 RX ORDER — ALBUTEROL SULFATE 0.83 MG/ML
2.5 SOLUTION RESPIRATORY (INHALATION) AS NEEDED
Status: DISCONTINUED | OUTPATIENT
Start: 2017-09-14 | End: 2017-09-14 | Stop reason: HOSPADM

## 2017-09-14 RX ORDER — ACETAMINOPHEN 500 MG
1000 TABLET ORAL ONCE
Status: DISCONTINUED | OUTPATIENT
Start: 2017-09-14 | End: 2017-09-14

## 2017-09-14 RX ORDER — SODIUM CHLORIDE 0.9 % (FLUSH) 0.9 %
5-10 SYRINGE (ML) INJECTION AS NEEDED
Status: DISCONTINUED | OUTPATIENT
Start: 2017-09-14 | End: 2017-09-18 | Stop reason: HOSPADM

## 2017-09-14 RX ORDER — LIDOCAINE HYDROCHLORIDE 10 MG/ML
0.1 INJECTION INFILTRATION; PERINEURAL AS NEEDED
Status: CANCELLED | OUTPATIENT
Start: 2017-09-14

## 2017-09-14 RX ORDER — NALOXONE HYDROCHLORIDE 0.4 MG/ML
0.1 INJECTION, SOLUTION INTRAMUSCULAR; INTRAVENOUS; SUBCUTANEOUS AS NEEDED
Status: DISCONTINUED | OUTPATIENT
Start: 2017-09-14 | End: 2017-09-14 | Stop reason: HOSPADM

## 2017-09-14 RX ORDER — MAGNESIUM SULFATE HEPTAHYDRATE 40 MG/ML
4 INJECTION, SOLUTION INTRAVENOUS ONCE
Status: COMPLETED | OUTPATIENT
Start: 2017-09-14 | End: 2017-09-14

## 2017-09-14 RX ORDER — HYDROMORPHONE HYDROCHLORIDE 2 MG/ML
0.5 INJECTION, SOLUTION INTRAMUSCULAR; INTRAVENOUS; SUBCUTANEOUS
Status: DISCONTINUED | OUTPATIENT
Start: 2017-09-14 | End: 2017-09-14 | Stop reason: HOSPADM

## 2017-09-14 RX ORDER — SODIUM CHLORIDE 9 MG/ML
125 INJECTION, SOLUTION INTRAVENOUS CONTINUOUS
Status: DISCONTINUED | OUTPATIENT
Start: 2017-09-14 | End: 2017-09-15

## 2017-09-14 RX ORDER — SODIUM CHLORIDE, SODIUM LACTATE, POTASSIUM CHLORIDE, CALCIUM CHLORIDE 600; 310; 30; 20 MG/100ML; MG/100ML; MG/100ML; MG/100ML
100 INJECTION, SOLUTION INTRAVENOUS CONTINUOUS
Status: CANCELLED | OUTPATIENT
Start: 2017-09-14 | End: 2017-09-15

## 2017-09-14 RX ORDER — SODIUM CHLORIDE 0.9 % (FLUSH) 0.9 %
5-10 SYRINGE (ML) INJECTION EVERY 8 HOURS
Status: DISCONTINUED | OUTPATIENT
Start: 2017-09-14 | End: 2017-09-18 | Stop reason: HOSPADM

## 2017-09-14 RX ORDER — OXYCODONE HYDROCHLORIDE 5 MG/1
10 TABLET ORAL
Status: DISCONTINUED | OUTPATIENT
Start: 2017-09-14 | End: 2017-09-14 | Stop reason: HOSPADM

## 2017-09-14 RX ORDER — CEFTRIAXONE 1 G/1
1 INJECTION, POWDER, FOR SOLUTION INTRAMUSCULAR; INTRAVENOUS EVERY 24 HOURS
Status: DISCONTINUED | OUTPATIENT
Start: 2017-09-14 | End: 2017-09-14 | Stop reason: SDUPTHER

## 2017-09-14 RX ADMIN — HYDROCODONE BITARTRATE AND ACETAMINOPHEN 1 TABLET: 5; 325 TABLET ORAL at 12:59

## 2017-09-14 RX ADMIN — POTASSIUM CHLORIDE 20 MEQ: 20 TABLET, EXTENDED RELEASE ORAL at 09:06

## 2017-09-14 RX ADMIN — HYDROMORPHONE HYDROCHLORIDE 1 MG: 1 INJECTION, SOLUTION INTRAMUSCULAR; INTRAVENOUS; SUBCUTANEOUS at 00:46

## 2017-09-14 RX ADMIN — SODIUM CHLORIDE 125 ML/HR: 900 INJECTION, SOLUTION INTRAVENOUS at 23:51

## 2017-09-14 RX ADMIN — DOCUSATE SODIUM 100 MG: 100 CAPSULE, LIQUID FILLED ORAL at 16:39

## 2017-09-14 RX ADMIN — IBUPROFEN 600 MG: 600 TABLET, FILM COATED ORAL at 05:09

## 2017-09-14 RX ADMIN — DOCUSATE SODIUM 100 MG: 100 CAPSULE, LIQUID FILLED ORAL at 08:05

## 2017-09-14 RX ADMIN — POTASSIUM CHLORIDE 20 MEQ: 20 TABLET, EXTENDED RELEASE ORAL at 16:39

## 2017-09-14 RX ADMIN — IBUPROFEN 600 MG: 200 TABLET, FILM COATED ORAL at 22:50

## 2017-09-14 RX ADMIN — HYDROCODONE BITARTRATE AND ACETAMINOPHEN 1 TABLET: 5; 325 TABLET ORAL at 16:39

## 2017-09-14 RX ADMIN — SODIUM CHLORIDE 125 ML/HR: 900 INJECTION, SOLUTION INTRAVENOUS at 01:00

## 2017-09-14 RX ADMIN — Medication 10 ML: at 23:47

## 2017-09-14 RX ADMIN — HYDROCODONE BITARTRATE AND ACETAMINOPHEN 1 TABLET: 5; 325 TABLET ORAL at 20:50

## 2017-09-14 RX ADMIN — Medication 10 ML: at 14:00

## 2017-09-14 RX ADMIN — ACETAMINOPHEN 650 MG: 325 TABLET ORAL at 01:17

## 2017-09-14 RX ADMIN — ACETAMINOPHEN 650 MG: 325 TABLET ORAL at 20:50

## 2017-09-14 RX ADMIN — CEFTRIAXONE SODIUM 1 G: 1 INJECTION, POWDER, FOR SOLUTION INTRAMUSCULAR; INTRAVENOUS at 23:46

## 2017-09-14 RX ADMIN — Medication 10 ML: at 06:00

## 2017-09-14 RX ADMIN — CHLOROPROCAINE HYDROCHLORIDE 2 ML: 30 INJECTION, SOLUTION EPIDURAL; INFILTRATION; INTRACAUDAL; PERINEURAL at 04:11

## 2017-09-14 RX ADMIN — Medication 10 ML: at 01:00

## 2017-09-14 RX ADMIN — SODIUM CHLORIDE, SODIUM LACTATE, POTASSIUM CHLORIDE, CALCIUM CHLORIDE: 600; 310; 30; 20 INJECTION, SOLUTION INTRAVENOUS at 04:07

## 2017-09-14 RX ADMIN — MAGNESIUM SULFATE HEPTAHYDRATE 4 G: 40 INJECTION, SOLUTION INTRAVENOUS at 09:06

## 2017-09-14 RX ADMIN — SODIUM CHLORIDE 125 ML/HR: 900 INJECTION, SOLUTION INTRAVENOUS at 14:16

## 2017-09-14 RX ADMIN — VANCOMYCIN HYDROCHLORIDE 1250 MG: 10 INJECTION, POWDER, LYOPHILIZED, FOR SOLUTION INTRAVENOUS at 16:38

## 2017-09-14 RX ADMIN — HYDROCODONE BITARTRATE AND ACETAMINOPHEN 1 TABLET: 5; 325 TABLET ORAL at 02:44

## 2017-09-14 RX ADMIN — IBUPROFEN 400 MG: 400 TABLET, FILM COATED ORAL at 03:14

## 2017-09-14 NOTE — ANESTHESIA POSTPROCEDURE EVALUATION
Post-Anesthesia Evaluation and Assessment    Patient: Ayse Chávez MRN: [id number]  SSN: [id number]    YOB: 1975  Age: 35 y.o. Sex: female       Cardiovascular Function/Vital Signs  Visit Vitals    BP 91/61    Pulse (!) 102    Temp 36.8 °C (98.2 °F)    Resp 16    Ht 5' 3\" (1.6 m)    Wt 68.9 kg (152 lb)    SpO2 98%    BMI 26.93 kg/m2       Patient is status post spinal anesthesia for Procedure(s):  CYSTOSCOPY LEFT URETERAL STENT INSERTION . Nausea/Vomiting: None    Postoperative hydration reviewed and adequate. Pain:  Pain Scale 1: Visual (09/14/17 1989)  Pain Intensity 1: 0 (09/14/17 9603)   Managed    Neurological Status:   Neuro (WDL): Within Defined Limits (09/14/17 0510)  Neuro  Neurologic State: Alert (09/14/17 5920)  Orientation Level: Oriented X4 (09/14/17 0330)  LLE Motor Response: Pharmacologically paralyzed (09/14/17 0437)  RLE Motor Response: Pharmacologically paralyzed (09/14/17 0437)   At baseline    Mental Status and Level of Consciousness: Arousable    Pulmonary Status:   O2 Device: Room air (09/14/17 0426)   Adequate oxygenation and airway patent    Complications related to anesthesia: None    Post-anesthesia assessment completed.  No concerns    Signed By: eMrlyn Bruce MD     September 14, 2017

## 2017-09-14 NOTE — ED NOTES
Kunal Dyer [facility]  [phone]  call if anything is needed.

## 2017-09-14 NOTE — ED NOTES
6465 pt placed for transport and transport just now taking pt to floor via stretcher at [place] [address]

## 2017-09-14 NOTE — PROGRESS NOTES
Patient to room. Oriented to room and call light. Dual skin assessment performed by this RN and Toshia RN. No breakdown noted. Will continue to monitor.

## 2017-09-14 NOTE — ANESTHESIA POSTPROCEDURE EVALUATION
Post-Anesthesia Evaluation and Assessment    Patient: Jimy Block MRN: [id number]  SSN: [id number]    YOB: 1975  Age: 35 y.o. Sex: female       Cardiovascular Function/Vital Signs  Visit Vitals    BP (!) 83/58    Pulse (!) 112    Temp 36.9 °C (98.4 °F)    Resp 18    Ht 5' 3\" (1.6 m)    Wt 68.9 kg (152 lb)    SpO2 97%    BMI 26.93 kg/m2       Patient is status post spinal anesthesia for Procedure(s):  CYSTOSCOPY LEFT URETERAL STENT INSERTION . Nausea/Vomiting: None    Postoperative hydration reviewed and adequate. Pain:  Pain Scale 1: Numeric (0 - 10) (09/14/17 1642)  Pain Intensity 1: 5 (09/14/17 1642)   Managed    Neurological Status:   Neuro (WDL): Within Defined Limits (09/14/17 0510)  Neuro  Neurologic State: Alert (09/14/17 8156)  Orientation Level: Oriented X4 (09/14/17 3923)  LLE Motor Response: Pharmacologically paralyzed (09/14/17 0437)  RLE Motor Response: Pharmacologically paralyzed (09/14/17 0437)   At baseline    Mental Status and Level of Consciousness: Arousable    Pulmonary Status:   O2 Device: Room air (09/14/17 1408)   Adequate oxygenation and airway patent    Complications related to anesthesia: None    Post-anesthesia assessment completed.  No concerns    Signed By: Millie Solorio MD     September 14, 2017

## 2017-09-14 NOTE — PERIOP NOTES
TRANSFER - OUT REPORT:    Verbal report given to [facility] OF EVIE RN(name) on Cam Quezada  being transferred to [facility](unit) for routine post - op       Report consisted of patients Situation, Background, Assessment and   Recommendations(SBAR). Information from the following report(s) Kardex, OR Summary, Intake/Output and MAR was reviewed with the receiving nurse. Lines:   Peripheral IV 09/13/17 Left Antecubital (Active)   Site Assessment Clean, dry, & intact 9/14/2017  5:24 AM   Phlebitis Assessment 0 9/14/2017  5:24 AM   Infiltration Assessment 0 9/14/2017  5:24 AM   Dressing Status Clean, dry, & intact 9/14/2017  5:24 AM   Dressing Type Tape;Transparent 9/14/2017  5:24 AM   Hub Color/Line Status Pink; Infusing 9/14/2017  5:24 AM        Opportunity for questions and clarification was provided.

## 2017-09-14 NOTE — CONSULTS
Infectious Disease Consult    Today's Date: 9/14/2017   Admit Date: 9/13/2017    Impression:   · GPC bacteremia 1 of 4 positice  · Urosepsis with associated nephrolithiasis and hydronephrosis/hydroureter s/p cystoscopy/Left ureteral stent placement: purulent urine, cx pending  · Hx of bladder sling 4/2016, frequent UTIs since then  · Cipro allergy: joint pain     Plan:   · Start Vancomycin, await GPC speciation  · Continue Ceftriaxone  · Follow cultures, temp curve    Anti-infectives:   Ceftriaxone 9/13-  Vancomycin 9/14-    Subjective:   Date of Consultation:  September 14, 2017  Referring Physician: Dr Aurora Duran    Patient is a 35 y.o. female with a hx significant for kidney stones, bladder sling with UTIs since then. She was admitted with several hours of fever/chills Tmax 103 and several days of Left flank pain. She has a CT urogram which noted left sided hydronephrosis, proximal hydroureter and a 5mm prox ureter calculus. BC were collected with 1 of 4 bottles, collected from the same site growing GPCs and urine cx pending. She underwent a cystoscope, left ureteral double J-stent placement and findings of purulent urine, cx pending. She has been started on Ceftriaxone with ID consulted to make recommendations. She reports that since her bladder sling was placed last year, she has experienced several UTIs, treated by her PCP. Her most recent infection was about 2 months ago. Presently she feels better.     Patient Active Problem List   Diagnosis Code    Gram positive septicemia ([address] A41.89    Pyelonephritis, acute N10    Sepsis ([address] A41.9    Hydronephrosis with ureteral calculus N13.2    Tachycardia R00.0    Essential hypertension I10    Ureteral stone N20.1    Left ureteral stone N20.1     Past Medical History:   Diagnosis Date    Difficult intubation     12/21/16 sore throat and ulcers after intubation     Essential hypertension 11/4/2016    only when septic in 10/2016, takes atenolol for HR    GERD (gastroesophageal reflux disease)     controlled by protonix    Hiatal hernia     Ill-defined condition     kidney stones    Ill-defined condition     SVT    Kidney stone     SVT (supraventricular tachycardia) ([address]      ablation    Tachycardia 2016      Family History   Problem Relation Age of Onset    Heart Disease Mother      congenital heart block with lalo    Diabetes Father     Hypertension Father       Social History   Substance Use Topics    Smoking status: Never Smoker    Smokeless tobacco: Never Used    Alcohol use No     Past Surgical History:   Procedure Laterality Date    CARDIAC SURG PROCEDURE UNLIST      ablation    FRAGMENT KIDNEY STONE/ ESWL      HX BLADDER SUSPENSION  2016    HX SVT ABLATION      HX TUBAL LIGATION      HX UROLOGICAL  10/2016    lithotripsy and basket extractions multiple      Prior to Admission medications    Medication Sig Start Date End Date Taking? Authorizing Provider   potassium chloride SR (KLOR-CON 10) 10 mEq tablet Take 20 mEq by mouth daily. Yes Historical Provider   atenolol (TENORMIN) 25 mg tablet Take 25 mg by mouth daily. Yes Historical Provider       Allergies   Allergen Reactions    Ciprofloxacin Other (comments)     \"joint pain\"        Review of Systems:  A comprehensive review of systems was negative except for that written in the History of Present Illness. Objective:     Visit Vitals    BP 91/61    Pulse (!) 102    Temp 98.2 °F (36.8 °C)    Resp 16    Ht 5' 3\" (1.6 m)    Wt 68.9 kg (152 lb)    SpO2 98%    BMI 26.93 kg/m2     Temp (24hrs), Av.2 °F (37.9 °C), Min:98.2 °F (36.8 °C), Max:103 °F (39.4 °C)       Lines:  Peripheral IV:  Left AC intact          Physical Exam:    General:  Alert, cooperative, well noursished, well developed, appears stated age   Eyes:  Sclera anicteric. Pupils equally round and reactive to light.    Mouth/Throat: Mucous membranes normal, oral pharynx clear   Neck: Supple Lungs:   Clear to auscultation bilaterally, good effort   CV:  Regular rate and rhythm,no murmur, click, rub or gallop   Abdomen:   Soft, non-tender. bowel sounds normal. non-distended   Extremities: No cyanosis or edema   Skin: Skin color, texture, turgor normal. no acute rash or lesions   Lymph nodes: Cervical and supraclavicular normal   Musculoskeletal: No swelling or deformity   Lines/Devices:  Intact, no erythema, drainage or tenderness   Psych: Alert and oriented, normal mood affect given the setting         Data Review:     CBC:Recent Labs      09/14/17 0633 09/13/17 1932   WBC  22.2*  14.6*   GRANS   --   90*   MONOS   --   7   EOS   --   0*   ANEU   --   13.0*   ABL   --   0.5   HGB  11.0*  14.0   HCT  31.7*  39.5   PLT  158  205       BMP:  Recent Labs      09/14/17 0633 09/13/17 1932   CREA  1.51*  1.39*   BUN  18  19   NA  137  137   K  3.4*  3.7   CL  106  103   CO2  21  24   AGAP  10  10   GLU  177*  116*       LFTS:  Recent Labs      09/13/17 1932   TBILI  0.5   ALT  20   SGOT  16   AP  102   TP  8.1   ALB  3.8       Microbiology:     All Micro Results     Procedure Component Value Units Date/Time    CULTURE, BLOOD [[id number]] Collected:  09/13/17 1942    Order Status:  Completed Specimen:  Blood from Blood Updated:  09/14/17 1051     Special Requests: --        LEFT  Antecubital       GRAM STAIN GRAM POSITIVE COCCI         AEROBIC BOTTLE POSITIVE                 RESULTS VERIFIED, PHONED TO AND READ BACK BY Camden Garber AT 1041 ON 9.14.17 BY      Culture result:         CULTURE IN [facility] UPDATES TO FOLLOW    CULTURE, URINE [[id number]] Collected:  09/14/17 0420    Order Status:  Completed Updated:  09/14/17 1003    CULTURE, URINE [[id number]] Collected:  09/13/17 2138    Order Status:  Completed Specimen:  Urine from Cath Urine Updated:  09/14/17 0850     Special Requests: NO SPECIAL REQUESTS        Culture result:         NO GROWTH AFTER SHORT PERIOD OF INCUBATION.  FURTHER RESULTS TO FOLLOW AFTER OVERNIGHT INCUBATION.     CULTURE, BLOOD [[id number]] Collected:  09/13/17 2115    Order Status:  Completed Specimen:  Blood from Blood Updated:  09/14/17 0841     Special Requests: --        LEFT  Antecubital       Culture result: NO GROWTH AFTER 11 HOURS             Imaging:   CT urogram: see HPI    Signed By: Carolann Cagle NP     September 14, 2017

## 2017-09-14 NOTE — ED PROVIDER NOTES
HPI Comments: Presents with complaint of fever and left flank pain. She has a history of kidney stones and sepsis secondary to infected stones 2 times in the past year. She sees Dr. Angeline Rdz. She took oxycodone with some relief. She states the pain started low and gradually got worse and then when she developed a fever this evening decided to come in. Patient is a 35 y.o. female presenting with flank pain. The history is provided by the patient. Flank Pain    This is a new problem. The current episode started 2 days ago. The problem has been gradually worsening. The problem occurs constantly. The pain is present in the left side. The quality of the pain is described as stabbing and similar to previous episodes. The pain radiates to the left groin. The pain is moderate. Associated symptoms include a fever and abdominal pain. Pertinent negatives include no abdominal swelling, no bladder incontinence, no dysuria and no pelvic pain. She has tried analgesics for the symptoms. The treatment provided no relief. Risk factors include history of kidney stones.         Past Medical History:   Diagnosis Date    Difficult intubation     12/21/16 sore throat and ulcers after intubation     Essential hypertension 11/4/2016    only when septic in 10/2016, takes atenolol for HR    GERD (gastroesophageal reflux disease)     controlled by protonix    Hiatal hernia     Ill-defined condition     kidney stones    Ill-defined condition     SVT    Kidney stone     SVT (supraventricular tachycardia) ([address]     2003 ablation    Tachycardia 11/4/2016       Past Surgical History:   Procedure Laterality Date    CARDIAC SURG PROCEDURE UNLIST  2003    ablation    FRAGMENT KIDNEY STONE/ ESWL      HX BLADDER SUSPENSION  04/2016    HX SVT ABLATION  2003    HX TUBAL LIGATION  2006    HX UROLOGICAL  10/2016    lithotripsy and basket extractions multiple         Family History:   Problem Relation Age of Onset    Heart Disease Mother      congenital heart block with lalo    Diabetes Father     Hypertension Father        Social History     Social History    Marital status:      Spouse name: N/A    Number of children: N/A    Years of education: N/A     Occupational History    Not on file. Social History Main Topics    Smoking status: Never Smoker    Smokeless tobacco: Never Used    Alcohol use No    Drug use: No    Sexual activity: Not on file     Other Topics Concern    Not on file     Social History Narrative         ALLERGIES: Ciprofloxacin    Review of Systems   Constitutional: Positive for chills and fever. Gastrointestinal: Positive for abdominal pain. Genitourinary: Positive for flank pain. Negative for bladder incontinence, dysuria and pelvic pain. All other systems reviewed and are negative. Vitals:    09/13/17 1927 09/13/17 2245 09/13/17 2331   BP: 110/72 110/63 108/70   Pulse: (!) 119 98 95   Resp: 18 18 18   Temp: (!) 102.9 °F (39.4 °C) 100 °F (37.8 °C) 100 °F (37.8 °C)   SpO2: 99% 99% 99%   Weight: 68.9 kg (152 lb)     Height: 5' 3\" (1.6 m)              Physical Exam   Constitutional: She is oriented to person, place, and time. She appears well-developed and well-nourished. No distress. Looks well   HENT:   Head: Normocephalic and atraumatic. Cardiovascular: Normal rate and regular rhythm. Pulmonary/Chest: Effort normal and breath sounds normal.   Abdominal: Soft. She exhibits no distension. There is tenderness (left lower quadrant). There is no rebound and no guarding. Musculoskeletal: Normal range of motion. She exhibits tenderness (left flank). She exhibits no edema. Neurological: She is alert and oriented to person, place, and time. No cranial nerve deficit. Skin: Skin is warm and dry. She is not diaphoretic. Psychiatric: She has a normal mood and affect. Her behavior is normal.   Nursing note and vitals reviewed.        MDM  Number of Diagnoses or Management Options  Acute pyelonephritis:   Ureteral stone with hydronephrosis:   Diagnosis management comments: Patient with previous sepsis from infected stones and an elevated pro-calcitonin urine culture pansensitive. She has a fever and elevated white count. Her lactic and percussive tenderness are normal and she looks well. She was given antibiotics and IV fluids and Dilaudid for pain control. She felt better and continued to look well. CT scan shows a 5 mm stone. Discussed her case with Dr. Nayeli Mansfield and he will admit the patient for a stent in the morning. Discussed with nursing staff need to monitor patient's blood pressure closely and call Dr. Nayeli Mansfield emergently if she becomes hypotensive.        Amount and/or Complexity of Data Reviewed  Clinical lab tests: ordered and reviewed  Review and summarize past medical records: yes  Discuss the patient with other providers: yes    Risk of Complications, Morbidity, and/or Mortality  Presenting problems: high  Diagnostic procedures: moderate  Management options: high    Patient Progress  Patient progress: improved    ED Course       Procedures

## 2017-09-14 NOTE — PROGRESS NOTES
Hourly rounds completed on pt this shift. Will report to Nightshift RN. Pt with + BC growing gpc. Pt with +UC growing gpc as well. ID consulted and vanc started. Pt currently receiving rocephin and vanc. Pt afebrile this and hr trending down. Pt with pain in left side of abdomen. norco given x 2 this shift. currently resting quietly with bergeron still in place.  Will continue to monitor

## 2017-09-14 NOTE — PROGRESS NOTES
Admit Date: 9/13/2017    Subjective:     Patient has no new complaint. Objective:     Patient Vitals for the past 8 hrs:   BP Temp Pulse Resp SpO2   09/14/17 1147 91/61 - - - -   09/14/17 1052 (!) 74/44 98.2 °F (36.8 °C) (!) 102 16 98 %   09/14/17 0700 (!) 84/54 98.8 °F (37.1 °C) (!) 117 16 97 %   09/14/17 0525 92/50 - (!) 121 15 96 %   09/14/17 0510 96/51 98.9 °F (37.2 °C) (!) 124 18 96 %   09/14/17 0459 100/52 - (!) 120 17 96 %        09/12 1901 - 09/14 0700  In: 1000 [I.V.:1000]  Out: 225 [Urine:225]    Physical Exam: mildly tachy, improved  CTAB  abd soft        Data Review   Recent Results (from the past 24 hour(s))   CBC WITH AUTOMATED DIFF    Collection Time: 09/13/17  7:32 PM   Result Value Ref Range    WBC 14.6 (H) 4.3 - 11.1 K/uL    RBC 4.47 4.05 - 5.25 M/uL    HGB 14.0 11.7 - 15.4 g/dL    HCT 39.5 35.8 - 46.3 %    MCV 88.4 79.6 - 97.8 FL    MCH 31.3 26.1 - 32.9 PG    MCHC 35.4 (H) 31.4 - 35.0 g/dL    RDW 12.2 11.9 - 14.6 %    PLATELET 000 319 - 036 K/uL    MPV 10.4 (L) 10.8 - 14.1 FL    DF AUTOMATED      NEUTROPHILS 90 (H) 43 - 78 %    LYMPHOCYTES 3 (L) 13 - 44 %    MONOCYTES 7 4.0 - 12.0 %    EOSINOPHILS 0 (L) 0.5 - 7.8 %    BASOPHILS 0 0.0 - 2.0 %    IMMATURE GRANULOCYTES 0.2 0.0 - 5.0 %    ABS. NEUTROPHILS 13.0 (H) 1.7 - 8.2 K/UL    ABS. LYMPHOCYTES 0.5 0.5 - 4.6 K/UL    ABS. MONOCYTES 1.0 0.1 - 1.3 K/UL    ABS. EOSINOPHILS 0.0 0.0 - 0.8 K/UL    ABS. BASOPHILS 0.0 0.0 - 0.2 K/UL    ABS. IMM.  GRANS. 0.0 0.0 - 0.5 K/UL   METABOLIC PANEL, COMPREHENSIVE    Collection Time: 09/13/17  7:32 PM   Result Value Ref Range    Sodium 137 136 - 145 mmol/L    Potassium 3.7 3.5 - 5.1 mmol/L    Chloride 103 98 - 107 mmol/L    CO2 24 21 - 32 mmol/L    Anion gap 10 7 - 16 mmol/L    Glucose 116 (H) 65 - 100 mg/dL    BUN 19 6 - 23 MG/DL    Creatinine 1.39 (H) 0.6 - 1.0 MG/DL    GFR est AA 53 (L) >60 ml/min/1.73m2    GFR est non-AA 44 (L) >60 ml/min/1.73m2    Calcium 9.3 8.3 - 10.4 MG/DL    Bilirubin, total 0.5 0.2 - 1.1 MG/DL    ALT (SGPT) 20 12 - 65 U/L    AST (SGOT) 16 15 - 37 U/L    Alk. phosphatase 102 50 - 136 U/L    Protein, total 8.1 6.3 - 8.2 g/dL    Albumin 3.8 3.5 - 5.0 g/dL    Globulin 4.3 (H) 2.3 - 3.5 g/dL    A-G Ratio 0.9 (L) 1.2 - 3.5     PROCALCITONIN    Collection Time: 09/13/17  7:32 PM   Result Value Ref Range    Procalcitonin <0.1 ng/mL   CULTURE, BLOOD    Collection Time: 09/13/17  7:42 PM   Result Value Ref Range    Special Requests: LEFT  Antecubital        GRAM STAIN GRAM POSITIVE COCCI      GRAM STAIN AEROBIC BOTTLE POSITIVE      GRAM STAIN        RESULTS VERIFIED, PHONED TO AND READ BACK BY MARLON LINDSAY RN AT 1803 ON 9.14.17 BY     Culture result: CULTURE IN [address] UPDATES TO FOLLOW     CULTURE, BLOOD    Collection Time: 09/13/17  9:15 PM   Result Value Ref Range    Special Requests: LEFT  Antecubital        Culture result: NO GROWTH AFTER 11 HOURS     POC LACTIC ACID    Collection Time: 09/13/17  9:20 PM   Result Value Ref Range    Lactic Acid (POC) 1.4 0.5 - 1.9 mmol/L   URINE MICROSCOPIC    Collection Time: 09/13/17  9:38 PM   Result Value Ref Range    WBC 20-50 0 /hpf    RBC 5-10 0 /hpf    Epithelial cells 0-3 0 /hpf    Bacteria 4+ (H) 0 /hpf    Casts 0 0 /lpf    Crystals, urine MODERATE 0 /LPF    Mucus 0 0 /lpf    Other observations RESULTS VERIFIED MANUALLY     CULTURE, URINE    Collection Time: 09/13/17  9:38 PM   Result Value Ref Range    Special Requests: NO SPECIAL REQUESTS      Culture result:        NO GROWTH AFTER SHORT PERIOD OF INCUBATION. FURTHER RESULTS TO FOLLOW AFTER OVERNIGHT INCUBATION.    HCG URINE, QL    Collection Time: 09/13/17  9:40 PM   Result Value Ref Range    HCG urine, Ql. NEGATIVE  NEG     METABOLIC PANEL, BASIC    Collection Time: 09/14/17  6:33 AM   Result Value Ref Range    Sodium 137 136 - 145 mmol/L    Potassium 3.4 (L) 3.5 - 5.1 mmol/L    Chloride 106 98 - 107 mmol/L    CO2 21 21 - 32 mmol/L    Anion gap 10 7 - 16 mmol/L    Glucose 177 (H) 65 - 100 mg/dL BUN 18 6 - 23 MG/DL    Creatinine 1.51 (H) 0.6 - 1.0 MG/DL    GFR est AA 48 (L) >60 ml/min/1.73m2    GFR est non-AA 40 (L) >60 ml/min/1.73m2    Calcium 8.3 8.3 - 10.4 MG/DL   CBC W/O DIFF    Collection Time: 09/14/17  6:33 AM   Result Value Ref Range    WBC 22.2 (H) 4.3 - 11.1 K/uL    RBC 3.57 (L) 4.05 - 5.25 M/uL    HGB 11.0 (L) 11.7 - 15.4 g/dL    HCT 31.7 (L) 35.8 - 46.3 %    MCV 88.8 79.6 - 97.8 FL    MCH 30.8 26.1 - 32.9 PG    MCHC 34.7 31.4 - 35.0 g/dL    RDW 12.2 11.9 - 14.6 %    PLATELET 297 931 - 902 K/uL    MPV 10.3 (L) 10.8 - 14.1 FL   MAGNESIUM    Collection Time: 09/14/17  6:33 AM   Result Value Ref Range    Magnesium 1.5 (L) 1.8 - 2.4 mg/dL           Assessment:     Principal Problem:    Left ureteral stone (9/13/2017)    Sepsis    Plan:       S/P cystoscopy and LEFT ureteral stent placement    Blood culture returned Gram +, ID consult pending. Lytes repleted and labs ordered for AM.    Leave bergeron in for now, likely remove tomorrow.          Siobhan Henderson MD

## 2017-09-14 NOTE — H&P
History and Physical    Subjective:      Juliet Marion is a 35 y.o. female presents to ER with fever and LEFT flank pain. L flank pain has been present 4-5 days and fever 6-8 hours. She has a long standing history of nephrolithiasis and is followed in our office by Dr. Michelle Calderon. CT urogram in ER this evening reveals moderate left hydronephrosis and proximal hydroureter due to a 5 mm calculus within the proximal left ureter. Several additional bilateral renal calculi and findings suggesting medullary nephrocalcinosis. Images and report were both personally reviewed. She was admitted to [facility] in 10/2016 with fever with supraventricular tachycardia. At that time she had a 4 mm stone in the right proximal ureter with multiple other large stones within both kidneys. Had 1 of 2 blood cultures Staphylococcus epidermidis. On 10/27/2016, she had cystoscopy and right stent placement. Followup KUB showed a 4 mm stone at L3 on the right and a 23 mm stone in the right mid kidney. Because of stones in both places she underwent right ureteral pyeloscopy on 11/23/2016 with laser lithotripsy of the 23 mm stone in the right kidney.  We did not find a stone in the ureter.        Past Medical History:   Diagnosis Date    Difficult intubation     12/21/16 sore throat and ulcers after intubation     Essential hypertension 11/4/2016    only when septic in 10/2016, takes atenolol for HR    GERD (gastroesophageal reflux disease)     controlled by protonix    Hiatal hernia     Ill-defined condition     kidney stones    Ill-defined condition     SVT    Kidney stone     SVT (supraventricular tachycardia) ([address]     2003 ablation    Tachycardia 11/4/2016     Past Surgical History:   Procedure Laterality Date    CARDIAC SURG PROCEDURE UNLIST  2003    ablation    FRAGMENT KIDNEY STONE/ ESWL      HX BLADDER SUSPENSION  04/2016    HX SVT ABLATION  2003    HX TUBAL LIGATION  2006    HX UROLOGICAL  10/2016 lithotripsy and basket extractions multiple      Family History   Problem Relation Age of Onset    Heart Disease Mother      congenital heart block with lalo    Diabetes Father     Hypertension Father      Social History   Substance Use Topics    Smoking status: Never Smoker    Smokeless tobacco: Never Used    Alcohol use No     Allergies   Allergen Reactions    Ciprofloxacin Other (comments)     \"joint pain\"     Prior to Admission medications    Medication Sig Start Date End Date Taking? Authorizing Provider   potassium chloride SR (KLOR-CON 10) 10 mEq tablet Take 20 mEq by mouth daily. Historical Provider   atenolol (TENORMIN) 25 mg tablet Take 25 mg by mouth daily. Historical Provider        Review of Systems:  Pertinent items are noted in HPI. Objective:      Patient Vitals for the past 8 hrs:   BP Temp Pulse Resp SpO2 Height Weight   17 2245 110/63 100 °F (37.8 °C) 98 18 99 % - -   17 1927 110/72 (!) 102.9 °F (39.4 °C) (!) 119 18 99 % 5' 3\" (1.6 m) 152 lb (68.9 kg)       Temp (24hrs), Av.5 °F (38.6 °C), Min:100 °F (37.8 °C), Max:102.9 °F (39.4 °C)      Physical Exam:  GENERAL: alert, cooperative, no distress, appears stated age, LUNG: clear to auscultation bilaterally, HEART: tachycardic and regular rhythm, ABDOMEN: soft, non-tender.  Bowel sounds normal. No masses,  no organomegaly    Recent Results (from the past 12 hour(s))   CBC WITH AUTOMATED DIFF    Collection Time: 17  7:32 PM   Result Value Ref Range    WBC 14.6 (H) 4.3 - 11.1 K/uL    RBC 4.47 4.05 - 5.25 M/uL    HGB 14.0 11.7 - 15.4 g/dL    HCT 39.5 35.8 - 46.3 %    MCV 88.4 79.6 - 97.8 FL    MCH 31.3 26.1 - 32.9 PG    MCHC 35.4 (H) 31.4 - 35.0 g/dL    RDW 12.2 11.9 - 14.6 %    PLATELET 547 312 - 893 K/uL    MPV 10.4 (L) 10.8 - 14.1 FL    DF AUTOMATED      NEUTROPHILS 90 (H) 43 - 78 %    LYMPHOCYTES 3 (L) 13 - 44 %    MONOCYTES 7 4.0 - 12.0 %    EOSINOPHILS 0 (L) 0.5 - 7.8 %    BASOPHILS 0 0.0 - 2.0 %    IMMATURE GRANULOCYTES 0.2 0.0 - 5.0 %    ABS. NEUTROPHILS 13.0 (H) 1.7 - 8.2 K/UL    ABS. LYMPHOCYTES 0.5 0.5 - 4.6 K/UL    ABS. MONOCYTES 1.0 0.1 - 1.3 K/UL    ABS. EOSINOPHILS 0.0 0.0 - 0.8 K/UL    ABS. BASOPHILS 0.0 0.0 - 0.2 K/UL    ABS. IMM. GRANS. 0.0 0.0 - 0.5 K/UL   METABOLIC PANEL, COMPREHENSIVE    Collection Time: 09/13/17  7:32 PM   Result Value Ref Range    Sodium 137 136 - 145 mmol/L    Potassium 3.7 3.5 - 5.1 mmol/L    Chloride 103 98 - 107 mmol/L    CO2 24 21 - 32 mmol/L    Anion gap 10 7 - 16 mmol/L    Glucose 116 (H) 65 - 100 mg/dL    BUN 19 6 - 23 MG/DL    Creatinine 1.39 (H) 0.6 - 1.0 MG/DL    GFR est AA 53 (L) >60 ml/min/1.73m2    GFR est non-AA 44 (L) >60 ml/min/1.73m2    Calcium 9.3 8.3 - 10.4 MG/DL    Bilirubin, total 0.5 0.2 - 1.1 MG/DL    ALT (SGPT) 20 12 - 65 U/L    AST (SGOT) 16 15 - 37 U/L    Alk. phosphatase 102 50 - 136 U/L    Protein, total 8.1 6.3 - 8.2 g/dL    Albumin 3.8 3.5 - 5.0 g/dL    Globulin 4.3 (H) 2.3 - 3.5 g/dL    A-G Ratio 0.9 (L) 1.2 - 3.5     PROCALCITONIN    Collection Time: 09/13/17  7:32 PM   Result Value Ref Range    Procalcitonin <0.1 ng/mL   POC LACTIC ACID    Collection Time: 09/13/17  9:20 PM   Result Value Ref Range    Lactic Acid (POC) 1.4 0.5 - 1.9 mmol/L   URINE MICROSCOPIC    Collection Time: 09/13/17  9:38 PM   Result Value Ref Range    WBC 20-50 0 /hpf    RBC 5-10 0 /hpf    Epithelial cells 0-3 0 /hpf    Bacteria 4+ (H) 0 /hpf    Casts 0 0 /lpf    Crystals, urine MODERATE 0 /LPF    Mucus 0 0 /lpf    Other observations RESULTS VERIFIED MANUALLY     HCG URINE, QL    Collection Time: 09/13/17  9:40 PM   Result Value Ref Range    HCG urine, Ql. NEGATIVE  NEG           Assessment:     ureterolithiasis left 4mm stone    UTI    Plan:     1. The risks, benefits, complications, treatment options, and expected outcomes were discussed with the patient. The patient understands the risks, any and all questions were answered to the patient's satisfaction.   2. Proceed with admission, IVFs, IV antibiotics, and to OR for cystoscopy with LEFT ureteral stent insertion.      Signed By: Jagdish Treadwell MD                         September 13, 2017

## 2017-09-14 NOTE — ANESTHESIA PREPROCEDURE EVALUATION
Anesthetic History     Increased risk of difficult airway          Review of Systems / Medical History  Patient summary reviewed, nursing notes reviewed and pertinent labs reviewed    Pulmonary  Within defined limits                 Neuro/Psych   Within defined limits           Cardiovascular    Hypertension        Dysrhythmias : SVT      Exercise tolerance: >4 METS  Comments: SVT ablation about 13 years ago   GI/Hepatic/Renal     GERD: well controlled    Renal disease: stones  Hiatal hernia (asymptomatic)     Endo/Other  Within defined limits           Other Findings            Physical Exam    Airway  Mallampati: II  TM Distance: 4 - 6 cm  Neck ROM: normal range of motion   Mouth opening: Normal     Cardiovascular    Rhythm: regular           Dental  No notable dental hx       Pulmonary  Breath sounds clear to auscultation               Abdominal         Other Findings            Anesthetic Plan    ASA: 2  Anesthesia type: spinal          Induction: Intravenous  Anesthetic plan and risks discussed with: Patient and Spouse

## 2017-09-14 NOTE — ANESTHESIA PROCEDURE NOTES
Spinal Block    Start time: 9/14/2017 4:09 AM  End time: 9/14/2017 4:11 AM  Performed by: Lisa Mays  Authorized by: Lisa Mays     Pre-procedure: Indications: primary anesthetic  Preanesthetic Checklist: patient identified, risks and benefits discussed, anesthesia consent, site marked, patient being monitored and timeout performed    Timeout Time: 04:09          Spinal Block:   Patient Position:  Right lateral decubitus  Prep Region:  Lumbar  Prep: chlorhexidine      Location:  L3-4  Technique:  Single shot  Local:  Lidocaine 1%  Local Dose (mL):  3    Needle:   Needle Type:  Pencan  Needle Gauge:  25 G  Attempts:  1      Events: CSF confirmed, no blood with aspiration and no paresthesia        Assessment:  Insertion:  Uncomplicated  Patient tolerance:  Patient tolerated the procedure well with no immediate complications  Anesthesiology Spinal Procedure Note    Risks and benefits were discussed with patient and plans are to proceed. Patient was placed in the right decubitus position. The back was prepped at the lumbar region with Chlorhexidine. 1% xylocaine was used as local at L3-L4. A  25g Pencan was passed 1 times. Easy aspiration of CSF was noted.   60 mg hyperbaric Chloroprocaine

## 2017-09-14 NOTE — BRIEF OP NOTE
BRIEF OPERATIVE NOTE    Date of Procedure: 9/14/2017   Preoperative Diagnosis: LEFT ureteral stone and sepsis  Postoperative Diagnosis: same  Procedure(s):  CYSTOSCOPY, LEFT URETERAL STENT INSERTION   Surgeon(s) and Role:     * Salima Leon MD - Primary         Assistant Staff:       Surgical Staff:  Circ-1: Felix Samson RN  Event Time In   Incision Start 0782   Incision Close      Anesthesia: General   Estimated Blood Loss: minimal  Specimens:   ID Type Source Tests Collected by Time Destination   1 : Urine for culture Fresh Urine, Cystoscopy  Salima Leon MD 9/14/2017 0420 Cytology      Findings: see dictation   Complications: none apparent  Implants: * No implants in log *

## 2017-09-14 NOTE — PROGRESS NOTES
TRANSFER - IN REPORT:    Verbal report received from DEE Glass on Meka Garcia  being received from ED for routine progression of care      Report consisted of patients Situation, Background, Assessment and   Recommendations(SBAR). Information from the following report(s) SBAR, MAR and Recent Results was reviewed with the receiving nurse. Opportunity for questions and clarification was provided. Assessment completed upon patients arrival to unit and care assumed.

## 2017-09-14 NOTE — PROGRESS NOTES
09/14/17 0243   Vital Signs   Temp (!) 103 °F (39.4 °C)   Temp Source Oral   Pulse (Heart Rate) (!) 143   Heart Rate Source Monitor   Resp Rate 18   O2 Sat (%) 98 %   Level of Consciousness Alert   /64   MAP (Calculated) 79   Doctor paged. Pt given Rogers for temp and pain reported at a 5/10 on pain scale.

## 2017-09-14 NOTE — OP NOTES
[address]   OPERATIVE REPORT       Name:  Concetta Beaver   MR#:  [id number]   :  1975   Account #:  [de-identified]   Date of Adm:  2017       DATE OF SURGERY: 2017. PREPROCEDURE DIAGNOSIS: A 4 mm left proximal ureteral stone with   sepsis. POSTOPERATIVE DIAGNOSIS: A 4 mm left proximal ureteral stone   with sepsis. NAME OF PROCEDURE:   1. Cystourethroscopy. 2. Left ureteral stent placement. ANESTHESIA: Spinal.    ESTIMATED BLOOD LOSS: Minimal.    COMPLICATIONS: None apparent. INDICATIONS: This is a 73-year-old female who presented to the   emergency room at [facility] this evening with 4-5 day   history of left-sided flank pain and 6- to 8-hour history of   fever. CAT scan revealed a 5 mm left proximal ureteral stone   with left hydroureteronephrosis proximal to the stone. She also   had a 6 to 7 mm left renal stone and a punctate stone in the   right kidney. She was febrile and tachycardic in the emergency   room which cleared with Tylenol, but unfortunately after   admission, she continued to become more and more tachycardic and   her fever spiked to 103 and she was therefore taken emergently   to the operating room for the above-named procedure. TECHNIQUE: The patient was taken to the operating room and   placed in the supine position. She was then repositioned with   anesthesia induced via spinal and finally positioned in the   lithotomy position and prepped and draped in sterile surgical   fashion with genitalia in a sterile field. A 22-Slovenian rigid   cystoscope was introduced atraumatically via the urethra and   panurethroscopy and cystoscopy was performed. She had a   cystocele but no other abnormalities noted. Attention was turned   to the left ureteral orifice which was cannulated with a Sensor   wire, which could be advanced until the wire curled in the renal   pelvis.  As the wire past the proximal ureteral stone, she then   had an immediate output of purulent urinary output via the left   ureteral orifice indicative of obstruction and infection   proximal to the stone. Once the wire was in place, a 6-Sudanese x   24 cm double-J ureteral stent was deployed with good curl noted   in the renal pelvis and proximal to the stone by fluoroscopy and   distally there was curl in the bladder visible by cystoscopy. Urinary output via the stent continued and was visible and the   cystoscope and wire were then removed. I then placed a 16-Sudanese   catheter using sterile technique with 10 mL of sterile water   used to inflate the balloon and the patient left the operating   room with a left ureteral stent in place and a Dye catheter in   place.         MD CHARLOTTE Strong / Shelby.Judah   D:  09/14/2017   04:35   T:  09/14/2017   07:59   Job #:  [id number]

## 2017-09-14 NOTE — PROGRESS NOTES
Pharmacokinetic Consult to Pharmacist    Dalia Michael is a 35 y.o. female being treated for bacteremia and UTI with vancomycin and ceftriaxone. Height: 5' 3\" (160 cm)  Weight: 68.9 kg (152 lb)  Lab Results   Component Value Date/Time    BUN 18 09/14/2017 06:33 AM    Creatinine 1.51 09/14/2017 06:33 AM    WBC 22.2 09/14/2017 06:33 AM    Procalcitonin <0.1 09/13/2017 07:32 PM    Lactic acid 0.6 12/22/2016 12:50 AM    Lactic Acid (POC) 1.4 09/13/2017 09:20 PM      Estimated Creatinine Clearance: 44.7 mL/min (based on Cr of 1.51). CULTURES:  9/13 urine-->negative pending  9/14 urine-->pending  9/13 blood-->GPC 1/2 pending    Day 1 of vancomycin. Goal trough is 15-20. Vancomycin dose initiated at 1250mg q 24 hours. Will continue to follow patient.       Thank you,  Heriberto Jesus, PharmD

## 2017-09-14 NOTE — PROGRESS NOTES
TRANSFER - IN REPORT:    Verbal report received from Orin Stanton, [id number] [facility]  being received from [facility] for routine progression of care      Report consisted of patients Situation, Background, Assessment and   Recommendations(SBAR). Information from the following report(s) SBAR and Intake/Output was reviewed with the receiving nurse. Opportunity for questions and clarification was provided. Assessment completed upon patients arrival to unit and care assumed.

## 2017-09-15 LAB
ANION GAP SERPL CALC-SCNC: 10 MMOL/L (ref 7–16)
BASOPHILS # BLD: 0 K/UL (ref 0–0.2)
BASOPHILS NFR BLD: 0 % (ref 0–2)
BUN SERPL-MCNC: 14 MG/DL (ref 6–23)
CALCIUM SERPL-MCNC: 8.3 MG/DL (ref 8.3–10.4)
CHLORIDE SERPL-SCNC: 113 MMOL/L (ref 98–107)
CO2 SERPL-SCNC: 19 MMOL/L (ref 21–32)
CREAT SERPL-MCNC: 0.98 MG/DL (ref 0.6–1)
DIFFERENTIAL METHOD BLD: ABNORMAL
EOSINOPHIL # BLD: 0 K/UL (ref 0–0.8)
EOSINOPHIL NFR BLD: 0 % (ref 0.5–7.8)
ERYTHROCYTE [DISTWIDTH] IN BLOOD BY AUTOMATED COUNT: 12.9 % (ref 11.9–14.6)
GLUCOSE SERPL-MCNC: 106 MG/DL (ref 65–100)
HCT VFR BLD AUTO: 31.6 % (ref 35.8–46.3)
HGB BLD-MCNC: 10.8 G/DL (ref 11.7–15.4)
IMM GRANULOCYTES # BLD: 0.1 K/UL (ref 0–0.5)
IMM GRANULOCYTES NFR BLD: 0.4 % (ref 0–5)
LYMPHOCYTES # BLD: 0.4 K/UL (ref 0.5–4.6)
LYMPHOCYTES NFR BLD: 3 % (ref 13–44)
MAGNESIUM SERPL-MCNC: 2.4 MG/DL (ref 1.8–2.4)
MCH RBC QN AUTO: 30.6 PG (ref 26.1–32.9)
MCHC RBC AUTO-ENTMCNC: 34.2 G/DL (ref 31.4–35)
MCV RBC AUTO: 89.5 FL (ref 79.6–97.8)
MONOCYTES # BLD: 1 K/UL (ref 0.1–1.3)
MONOCYTES NFR BLD: 7 % (ref 4–12)
NEUTS SEG # BLD: 13.6 K/UL (ref 1.7–8.2)
NEUTS SEG NFR BLD: 90 % (ref 43–78)
PLATELET # BLD AUTO: 136 K/UL (ref 150–450)
PMV BLD AUTO: 10.7 FL (ref 10.8–14.1)
POTASSIUM SERPL-SCNC: 4 MMOL/L (ref 3.5–5.1)
RBC # BLD AUTO: 3.53 M/UL (ref 4.05–5.25)
SODIUM SERPL-SCNC: 142 MMOL/L (ref 136–145)
WBC # BLD AUTO: 15.1 K/UL (ref 4.3–11.1)

## 2017-09-15 PROCEDURE — 74011250637 HC RX REV CODE- 250/637: Performed by: UROLOGY

## 2017-09-15 PROCEDURE — 74011250636 HC RX REV CODE- 250/636: Performed by: UROLOGY

## 2017-09-15 PROCEDURE — 74011000250 HC RX REV CODE- 250: Performed by: UROLOGY

## 2017-09-15 PROCEDURE — 74011000258 HC RX REV CODE- 258: Performed by: INTERNAL MEDICINE

## 2017-09-15 PROCEDURE — 74011250636 HC RX REV CODE- 250/636: Performed by: INTERNAL MEDICINE

## 2017-09-15 PROCEDURE — 36415 COLL VENOUS BLD VENIPUNCTURE: CPT | Performed by: UROLOGY

## 2017-09-15 PROCEDURE — 65270000029 HC RM PRIVATE

## 2017-09-15 PROCEDURE — 83735 ASSAY OF MAGNESIUM: CPT | Performed by: UROLOGY

## 2017-09-15 PROCEDURE — 85025 COMPLETE CBC W/AUTO DIFF WBC: CPT | Performed by: NURSE PRACTITIONER

## 2017-09-15 PROCEDURE — C8929 TTE W OR WO FOL WCON,DOPPLER: HCPCS

## 2017-09-15 PROCEDURE — 80048 BASIC METABOLIC PNL TOTAL CA: CPT | Performed by: UROLOGY

## 2017-09-15 RX ORDER — VANCOMYCIN HYDROCHLORIDE
1250 EVERY 12 HOURS
Status: DISCONTINUED | OUTPATIENT
Start: 2017-09-15 | End: 2017-09-16

## 2017-09-15 RX ADMIN — AMPICILLIN 2 G: 2 INJECTION, POWDER, FOR SOLUTION INTRAVENOUS at 16:58

## 2017-09-15 RX ADMIN — HYDROCODONE BITARTRATE AND ACETAMINOPHEN 1 TABLET: 5; 325 TABLET ORAL at 06:20

## 2017-09-15 RX ADMIN — ATENOLOL 25 MG: 50 TABLET ORAL at 08:18

## 2017-09-15 RX ADMIN — HYDROCODONE BITARTRATE AND ACETAMINOPHEN 1 TABLET: 5; 325 TABLET ORAL at 16:58

## 2017-09-15 RX ADMIN — ACETAMINOPHEN 650 MG: 325 TABLET ORAL at 06:18

## 2017-09-15 RX ADMIN — DOCUSATE SODIUM 100 MG: 100 CAPSULE, LIQUID FILLED ORAL at 08:18

## 2017-09-15 RX ADMIN — DOCUSATE SODIUM 100 MG: 100 CAPSULE, LIQUID FILLED ORAL at 16:58

## 2017-09-15 RX ADMIN — PERFLUTREN 1 ML: 6.52 INJECTION, SUSPENSION INTRAVENOUS at 12:00

## 2017-09-15 RX ADMIN — SODIUM CHLORIDE 125 ML/HR: 900 INJECTION, SOLUTION INTRAVENOUS at 06:21

## 2017-09-15 RX ADMIN — AMPICILLIN 2 G: 2 INJECTION, POWDER, FOR SOLUTION INTRAVENOUS at 20:20

## 2017-09-15 RX ADMIN — HYDROCODONE BITARTRATE AND ACETAMINOPHEN 1 TABLET: 5; 325 TABLET ORAL at 13:05

## 2017-09-15 RX ADMIN — Medication 10 ML: at 22:33

## 2017-09-15 RX ADMIN — VANCOMYCIN HYDROCHLORIDE 1250 MG: 10 INJECTION, POWDER, LYOPHILIZED, FOR SOLUTION INTRAVENOUS at 13:28

## 2017-09-15 RX ADMIN — ONDANSETRON 4 MG: 2 INJECTION INTRAMUSCULAR; INTRAVENOUS at 20:20

## 2017-09-15 RX ADMIN — IBUPROFEN 800 MG: 800 TABLET, FILM COATED ORAL at 22:33

## 2017-09-15 RX ADMIN — Medication 10 ML: at 13:28

## 2017-09-15 RX ADMIN — ACETAMINOPHEN 650 MG: 325 TABLET ORAL at 13:28

## 2017-09-15 NOTE — PROGRESS NOTES
Hourly rounds completed on pt this shift. Will report to Nightshift RN. Pt + BC/UC currently on vanc and ampicillin. Repeat BC drawn today. 22 g IV placed in left arm. Infectious disease ordered TTE with EF being 55-60%. Pt given pain and tylenol per MAR. Dye taken out and voiding. Pt did no spike temp this shift. Tachycardic but in the lower 100s.  Will continue to monitor

## 2017-09-15 NOTE — PROGRESS NOTES
Admit Date: 9/13/2017    Subjective:     Patient has no new complaint. She is feeling better. Still spiking occasional temp. Objective:     Patient Vitals for the past 8 hrs:   BP Temp Pulse Resp SpO2   09/15/17 0716 97/51 99 °F (37.2 °C) (!) 120 18 97 %   09/15/17 0616 - 100 °F (37.8 °C) (!) 106 - 97 %   09/15/17 0322 109/55 98.5 °F (36.9 °C) (!) 103 18 96 %   09/15/17 0147 - 98.8 °F (37.1 °C) (!) 108 18 96 %        09/13 1901 - 09/15 0700  In: 1815 [P.O.:260; I.V.:1555]  Out: 1325 [Urine:1325]    Physical Exam: mildly tachy, reg rhythm, CTAB, abd soft        Data Review   Recent Results (from the past 24 hour(s))   METABOLIC PANEL, BASIC    Collection Time: 09/15/17  5:39 AM   Result Value Ref Range    Sodium 142 136 - 145 mmol/L    Potassium 4.0 3.5 - 5.1 mmol/L    Chloride 113 (H) 98 - 107 mmol/L    CO2 19 (L) 21 - 32 mmol/L    Anion gap 10 7 - 16 mmol/L    Glucose 106 (H) 65 - 100 mg/dL    BUN 14 6 - 23 MG/DL    Creatinine 0.98 0.6 - 1.0 MG/DL    GFR est AA >60 >60 ml/min/1.73m2    GFR est non-AA >60 >60 ml/min/1.73m2    Calcium 8.3 8.3 - 10.4 MG/DL   MAGNESIUM    Collection Time: 09/15/17  5:39 AM   Result Value Ref Range    Magnesium 2.4 1.8 - 2.4 mg/dL   CBC WITH AUTOMATED DIFF    Collection Time: 09/15/17  5:39 AM   Result Value Ref Range    WBC 15.1 (H) 4.3 - 11.1 K/uL    RBC 3.53 (L) 4.05 - 5.25 M/uL    HGB 10.8 (L) 11.7 - 15.4 g/dL    HCT 31.6 (L) 35.8 - 46.3 %    MCV 89.5 79.6 - 97.8 FL    MCH 30.6 26.1 - 32.9 PG    MCHC 34.2 31.4 - 35.0 g/dL    RDW 12.9 11.9 - 14.6 %    PLATELET 844 (L) 172 - 450 K/uL    MPV 10.7 (L) 10.8 - 14.1 FL    DF AUTOMATED      NEUTROPHILS 90 (H) 43 - 78 %    LYMPHOCYTES 3 (L) 13 - 44 %    MONOCYTES 7 4.0 - 12.0 %    EOSINOPHILS 0 (L) 0.5 - 7.8 %    BASOPHILS 0 0.0 - 2.0 %    IMMATURE GRANULOCYTES 0.4 0.0 - 5.0 %    ABS. NEUTROPHILS 13.6 (H) 1.7 - 8.2 K/UL    ABS. LYMPHOCYTES 0.4 (L) 0.5 - 4.6 K/UL    ABS. MONOCYTES 1.0 0.1 - 1.3 K/UL    ABS.  EOSINOPHILS 0.0 0.0 - 0.8 K/UL    ABS. BASOPHILS 0.0 0.0 - 0.2 K/UL    ABS. IMM. GRANS. 0.1 0.0 - 0.5 K/UL           Assessment:     Principal Problem:    Left ureteral stone (9/13/2017)    POD 1 s/p LEFT ureteral stent placement    Plan:       Labwork improving. Recheck CBC tomorrow. Await final culture and sensitivities. Currently on Vanc/Rocephin. Discontinue bergeron. Saline lock IV. Ambulate. Will need eventual cystoscopy, LEFT ureteroscopy, laser lithotripsy, LEFT ureteral stent exchange once infection cleared. Will be done as outpatient, not during this admission.     Siobhan Henderson MD

## 2017-09-15 NOTE — PROGRESS NOTES
Pharmacokinetic Consult to Pharmacist    Eduardo Jazmin is a 35 y.o. female being treated for bacteremia, UTI, and possible endocarditis with vancomycin. A TTE is pending today to evaluate for possible endocarditis given e faecalis bacteremia. Height: 5' 3\" (160 cm)  Weight: 68.9 kg (152 lb)  Lab Results   Component Value Date/Time    BUN 14 09/15/2017 05:39 AM    Creatinine 0.98 09/15/2017 05:39 AM    WBC 15.1 09/15/2017 05:39 AM    Procalcitonin <0.1 09/13/2017 07:32 PM    Lactic acid 0.6 12/22/2016 12:50 AM    Lactic Acid (POC) 1.4 09/13/2017 09:20 PM      Estimated Creatinine Clearance: 68.9 mL/min (based on Cr of 0.98). CULTURES:  9/13   Blood X 2  E faecalis in 1/2 bottles, pending     Urine   GPC, pending  9/14   Urine   GPC, pending  9/15   Blood X 2  Pending      Day 2 of vancomycin. Goal trough is 15-20 (or increase to 18-22 if endocarditis confirmed by TTE today)    Vancomycin dose initiated at 1250mg q 24 hours, however, due to improving renal function today will increase dose to 1250 mg Q12H. Plan to draw vancomycin trough level prior to the 4th dose. Will continue to follow patient.       Thank you,  Pernell Naik, PharmD  Clinical Pharmacist  [phone]

## 2017-09-15 NOTE — PROGRESS NOTES
Pt with temp 100.0 this morning. Given tylenol. Denies further needs, will continue to monitor. Pt had good urine output this shift with a total of 1800 ml. Pt ambulated several times in hallway before bed last night. Hourly rounds completed this shift. Will report to oncoming nurse.

## 2017-09-15 NOTE — PROGRESS NOTES
09/14/17 2234   Vital Signs   Temp (!) 101.2 °F (38.4 °C)   Temp Source Oral   Pulse (Heart Rate) (!) 136   Heart Rate Source Monitor   Resp Rate 18   O2 Sat (%) 99 %   Level of Consciousness Alert   /70   MAP (Calculated) 91   BP 1 Method Automatic   BP 1 Location Right arm   BP Patient Position Sitting   Notified doctor pt tachy 135 and felling some pressure in chest.

## 2017-09-15 NOTE — PROGRESS NOTES
Infectious Disease Progress note    Today's Date: 9/15/2017   Admit Date: 2017    Impression:   · Gamma strep bacteremia - suspect enterococcus  · Urosepsis with associated nephrolithiasis and hydronephrosis/hydroureter s/p cystoscopy/Left ureteral stent placement: purulent urine, cx GPCs, pending. Plans for stent replacement outpatient once infection cleared  · Hx of bladder sling 2016, frequent UTIs since then  · Cipro allergy: joint pain     Plan:   · Continue Vancomycin, suspect enterococcus  · Discontinue Ceftriaxone  · Recheck BC in am, check TTE  · Follow cultures, temp curve  · Pt will reschedule out of town trip, spoke to [address], anticipate prolonged course of IV antbx    Anti-infectives:   Ceftriaxone -9/15  Vancomycin -    Subjective:   Resting in bed, fever last night, none this am. Feeling better today. No nausea, or diarrhea. ID plan discussed at length. Allergies   Allergen Reactions    Ciprofloxacin Other (comments)     \"joint pain\"        Review of Systems:  A comprehensive review of systems was negative except for that written in the History of Present Illness. Objective:     Visit Vitals    BP 94/60 (BP 1 Location: Left arm, BP Patient Position: At rest)    Pulse (!) 102    Temp 98.1 °F (36.7 °C)    Resp 16    Ht 5' 3\" (1.6 m)    Wt 68.9 kg (152 lb)    SpO2 98%    BMI 26.93 kg/m2     Temp (24hrs), Av.5 °F (37.5 °C), Min:98.1 °F (36.7 °C), Max:101.2 °F (38.4 °C)       Lines:  Peripheral IV:  Left AC intact          Physical Exam:    General:  Alert, cooperative, well noursished, well developed, appears stated age   Eyes:  Sclera anicteric. Pupils equally round and reactive to light. Mouth/Throat: Mucous membranes normal, oral pharynx clear   Neck: Supple   Lungs:   Clear to auscultation bilaterally, good effort   CV:  Regular rate and rhythm,no murmur, click, rub or gallop   Abdomen:   Soft, non-tender.  bowel sounds normal. non-distended   Extremities: No cyanosis or edema   Skin: Skin color, texture, turgor normal. no acute rash or lesions   Musculoskeletal: No swelling or deformity   Lines/Devices:  Intact, no erythema, drainage or tenderness   Psych: Alert and oriented, normal mood affect given the setting         Data Review:     CBC:  Recent Labs      09/15/17   0539  09/14/17 0633 09/13/17 1932   WBC  15.1*  22.2*  14.6*   GRANS  90*   --   90*   MONOS  7   --   7   EOS  0*   --   0*   ANEU  13.6*   --   13.0*   ABL  0.4*   --   0.5   HGB  10.8*  11.0*  14.0   HCT  31.6*  31.7*  39.5   PLT  136*  158  205       BMP:  Recent Labs      09/15/17   0539  09/14/17 0633 09/13/17 1932   CREA  0.98  1.51*  1.39*   BUN  14  18  19   NA  142  137  137   K  4.0  3.4*  3.7   CL  113*  106  103   CO2  19*  21  24   AGAP  10  10  10   GLU  106*  177*  116*       LFTS:  Recent Labs      09/13/17 1932   TBILI  0.5   ALT  20   SGOT  16   AP  102   TP  8.1   ALB  3.8       Microbiology:     All Micro Results     Procedure Component Value Units Date/Time    CULTURE, URINE [[id number]]  (Abnormal) Collected:  09/14/17 0420    Order Status:  Completed Specimen:  Urine Updated:  09/15/17 0746     Special Requests: NO SPECIAL REQUESTS        Culture result:         >100,000 GRAM POSITIVE COCCI SUBCULTURE IN PROGRESS (A)    CULTURE, URINE [[id number]]  (Abnormal) Collected:  09/13/17 2138    Order Status:  Completed Specimen:  Urine from Cath Urine Updated:  09/15/17 0740     Special Requests: NO SPECIAL REQUESTS        Culture result:         >100,000 COLONIES/mL GRAM POSITIVE COCCI IDENTIFICATION AND SUSCEPTIBILITY TO FOLLOW (A)    CULTURE, BLOOD [[id number]]  (Abnormal) Collected:  09/13/17 1942    Order Status:  Completed Specimen:  Blood from Blood Updated:  09/15/17 0722     Special Requests: --        LEFT  Antecubital       GRAM STAIN GRAM POSITIVE COCCI         AEROBIC BOTTLE POSITIVE                 RESULTS VERIFIED, PHONED TO AND READ BACK BY MARLON LINDSAY RN  [phone] ON 9.14.17 BY WCLARA     Culture result:         GAMMA STREPTOCOCCUS IDENTIFICATION AND SUSCEPTIBILITY TO FOLLOW (A)    CULTURE, BLOOD [[id number]] Collected:  09/13/17 2115    Order Status:  Completed Specimen:  Blood from Blood Updated:  09/15/17 0713     Special Requests: --        LEFT  Antecubital       Culture result: NO GROWTH 2 DAYS             Imaging:   CT urogram: see HPI    Signed By: Sadie Shaw NP     September 15, 2017

## 2017-09-15 NOTE — PROGRESS NOTES
09/15/17 0322   Vital Signs   Temp 98.5 °F (36.9 °C)   Temp Source Oral   Pulse (Heart Rate) (!) 103   Heart Rate Source Monitor   Resp Rate 18   O2 Sat (%) 96 %   Level of Consciousness Alert   /55   MAP (Calculated) 73   BP 1 Method Automatic   BP 1 Location Left arm   BP Patient Position At rest   MEWS Score 2   Gave pt motrin for fever and ice packs under arms. Afebrile at this time and heart rate down to 103. Pt resting in bed. Will continue to monitor.

## 2017-09-16 LAB
BACTERIA SPEC CULT: ABNORMAL
BASOPHILS # BLD: 0 K/UL (ref 0–0.2)
BASOPHILS NFR BLD: 0 % (ref 0–2)
CREAT SERPL-MCNC: 0.86 MG/DL (ref 0.6–1)
DIFFERENTIAL METHOD BLD: ABNORMAL
EOSINOPHIL # BLD: 0 K/UL (ref 0–0.8)
EOSINOPHIL NFR BLD: 0 % (ref 0.5–7.8)
ERYTHROCYTE [DISTWIDTH] IN BLOOD BY AUTOMATED COUNT: 12.7 % (ref 11.9–14.6)
GRAM STN SPEC: ABNORMAL
HCT VFR BLD AUTO: 30.1 % (ref 35.8–46.3)
HGB BLD-MCNC: 10.7 G/DL (ref 11.7–15.4)
IMM GRANULOCYTES # BLD: 0.1 K/UL (ref 0–0.5)
IMM GRANULOCYTES NFR BLD: 0.5 % (ref 0–5)
LYMPHOCYTES # BLD: 0.7 K/UL (ref 0.5–4.6)
LYMPHOCYTES NFR BLD: 5 % (ref 13–44)
MCH RBC QN AUTO: 30.9 PG (ref 26.1–32.9)
MCHC RBC AUTO-ENTMCNC: 35.5 G/DL (ref 31.4–35)
MCV RBC AUTO: 87 FL (ref 79.6–97.8)
MONOCYTES # BLD: 0.7 K/UL (ref 0.1–1.3)
MONOCYTES NFR BLD: 6 % (ref 4–12)
NEUTS SEG # BLD: 11.3 K/UL (ref 1.7–8.2)
NEUTS SEG NFR BLD: 89 % (ref 43–78)
PLATELET # BLD AUTO: 153 K/UL (ref 150–450)
PMV BLD AUTO: 10.1 FL (ref 10.8–14.1)
RBC # BLD AUTO: 3.46 M/UL (ref 4.05–5.25)
SERVICE CMNT-IMP: ABNORMAL
SERVICE CMNT-IMP: ABNORMAL
VANCOMYCIN TROUGH SERPL-MCNC: 11.2 UG/ML (ref 5–20)
WBC # BLD AUTO: 12.8 K/UL (ref 4.3–11.1)

## 2017-09-16 PROCEDURE — 36415 COLL VENOUS BLD VENIPUNCTURE: CPT | Performed by: UROLOGY

## 2017-09-16 PROCEDURE — 74011000258 HC RX REV CODE- 258: Performed by: INTERNAL MEDICINE

## 2017-09-16 PROCEDURE — 80202 ASSAY OF VANCOMYCIN: CPT | Performed by: INTERNAL MEDICINE

## 2017-09-16 PROCEDURE — 87040 BLOOD CULTURE FOR BACTERIA: CPT | Performed by: NURSE PRACTITIONER

## 2017-09-16 PROCEDURE — 85025 COMPLETE CBC W/AUTO DIFF WBC: CPT | Performed by: NURSE PRACTITIONER

## 2017-09-16 PROCEDURE — 65270000029 HC RM PRIVATE

## 2017-09-16 PROCEDURE — 74011250637 HC RX REV CODE- 250/637: Performed by: UROLOGY

## 2017-09-16 PROCEDURE — 74011250636 HC RX REV CODE- 250/636: Performed by: INTERNAL MEDICINE

## 2017-09-16 PROCEDURE — 82565 ASSAY OF CREATININE: CPT | Performed by: UROLOGY

## 2017-09-16 RX ADMIN — IBUPROFEN 800 MG: 800 TABLET, FILM COATED ORAL at 20:56

## 2017-09-16 RX ADMIN — VANCOMYCIN HYDROCHLORIDE 1250 MG: 10 INJECTION, POWDER, LYOPHILIZED, FOR SOLUTION INTRAVENOUS at 13:24

## 2017-09-16 RX ADMIN — DOCUSATE SODIUM 100 MG: 100 CAPSULE, LIQUID FILLED ORAL at 08:45

## 2017-09-16 RX ADMIN — AMPICILLIN 2 G: 2 INJECTION, POWDER, FOR SOLUTION INTRAVENOUS at 02:51

## 2017-09-16 RX ADMIN — AMPICILLIN 2 G: 2 INJECTION, POWDER, FOR SOLUTION INTRAVENOUS at 08:45

## 2017-09-16 RX ADMIN — IBUPROFEN 800 MG: 800 TABLET, FILM COATED ORAL at 08:50

## 2017-09-16 RX ADMIN — ATENOLOL 25 MG: 50 TABLET ORAL at 08:45

## 2017-09-16 RX ADMIN — AMPICILLIN 2 G: 2 INJECTION, POWDER, FOR SOLUTION INTRAVENOUS at 15:29

## 2017-09-16 RX ADMIN — Medication 10 ML: at 05:37

## 2017-09-16 RX ADMIN — DOCUSATE SODIUM 100 MG: 100 CAPSULE, LIQUID FILLED ORAL at 16:28

## 2017-09-16 RX ADMIN — Medication 10 ML: at 21:06

## 2017-09-16 RX ADMIN — VANCOMYCIN HYDROCHLORIDE 1000 MG: 1 INJECTION, POWDER, LYOPHILIZED, FOR SOLUTION INTRAVENOUS at 22:00

## 2017-09-16 RX ADMIN — ACETAMINOPHEN 650 MG: 325 TABLET ORAL at 16:29

## 2017-09-16 RX ADMIN — VANCOMYCIN HYDROCHLORIDE 1250 MG: 10 INJECTION, POWDER, LYOPHILIZED, FOR SOLUTION INTRAVENOUS at 01:15

## 2017-09-16 RX ADMIN — AMPICILLIN 2 G: 2 INJECTION, POWDER, FOR SOLUTION INTRAVENOUS at 20:56

## 2017-09-16 RX ADMIN — Medication 10 ML: at 13:24

## 2017-09-16 NOTE — PROGRESS NOTES
Hourly rounds done. Pt fever of 101.4, motrin administered. Fever reduced to 99.5 to 98.0. Pt c/o of nausea, Zofran administered. No emesis episode. All needs met at this time. Pt currently resting quietly.

## 2017-09-17 LAB
BACTERIA SPEC CULT: ABNORMAL
BACTERIA SPEC CULT: ABNORMAL
SERVICE CMNT-IMP: ABNORMAL
VANCOMYCIN TROUGH SERPL-MCNC: 22.6 UG/ML (ref 5–20)

## 2017-09-17 PROCEDURE — 74011250637 HC RX REV CODE- 250/637: Performed by: UROLOGY

## 2017-09-17 PROCEDURE — 74011250636 HC RX REV CODE- 250/636: Performed by: INTERNAL MEDICINE

## 2017-09-17 PROCEDURE — 36569 INSJ PICC 5 YR+ W/O IMAGING: CPT | Performed by: UROLOGY

## 2017-09-17 PROCEDURE — 65270000029 HC RM PRIVATE

## 2017-09-17 PROCEDURE — 02HV33Z INSERTION OF INFUSION DEVICE INTO SUPERIOR VENA CAVA, PERCUTANEOUS APPROACH: ICD-10-PCS | Performed by: UROLOGY

## 2017-09-17 PROCEDURE — 36415 COLL VENOUS BLD VENIPUNCTURE: CPT | Performed by: INTERNAL MEDICINE

## 2017-09-17 PROCEDURE — 80202 ASSAY OF VANCOMYCIN: CPT | Performed by: INTERNAL MEDICINE

## 2017-09-17 PROCEDURE — 74011000258 HC RX REV CODE- 258: Performed by: INTERNAL MEDICINE

## 2017-09-17 RX ORDER — VANCOMYCIN 1.75 GRAM/500 ML IN 0.9 % SODIUM CHLORIDE INTRAVENOUS
1750 EVERY 12 HOURS
Status: DISCONTINUED | OUTPATIENT
Start: 2017-09-18 | End: 2017-09-18

## 2017-09-17 RX ADMIN — AMPICILLIN 2 G: 2 INJECTION, POWDER, FOR SOLUTION INTRAVENOUS at 02:47

## 2017-09-17 RX ADMIN — DOCUSATE SODIUM 100 MG: 100 CAPSULE, LIQUID FILLED ORAL at 08:19

## 2017-09-17 RX ADMIN — IBUPROFEN 800 MG: 800 TABLET, FILM COATED ORAL at 19:35

## 2017-09-17 RX ADMIN — Medication 10 ML: at 23:00

## 2017-09-17 RX ADMIN — Medication 10 ML: at 05:05

## 2017-09-17 RX ADMIN — AMPICILLIN 2 G: 2 INJECTION, POWDER, FOR SOLUTION INTRAVENOUS at 08:19

## 2017-09-17 RX ADMIN — DOCUSATE SODIUM 100 MG: 100 CAPSULE, LIQUID FILLED ORAL at 16:53

## 2017-09-17 RX ADMIN — AMPICILLIN 2 G: 2 INJECTION, POWDER, FOR SOLUTION INTRAVENOUS at 23:57

## 2017-09-17 RX ADMIN — ATENOLOL 25 MG: 50 TABLET ORAL at 08:19

## 2017-09-17 RX ADMIN — IBUPROFEN 800 MG: 800 TABLET, FILM COATED ORAL at 08:23

## 2017-09-17 RX ADMIN — VANCOMYCIN HYDROCHLORIDE 1000 MG: 1 INJECTION, POWDER, LYOPHILIZED, FOR SOLUTION INTRAVENOUS at 13:58

## 2017-09-17 RX ADMIN — VANCOMYCIN HYDROCHLORIDE 1000 MG: 1 INJECTION, POWDER, LYOPHILIZED, FOR SOLUTION INTRAVENOUS at 05:05

## 2017-09-17 RX ADMIN — Medication 10 ML: at 14:05

## 2017-09-17 RX ADMIN — AMPICILLIN 2 G: 2 INJECTION, POWDER, FOR SOLUTION INTRAVENOUS at 13:58

## 2017-09-17 NOTE — PROGRESS NOTES
Hourly rounds done. Pt denies pain, nausea, vomiting. Low fever of 99.5, motrin administered. Spiked to 101 hour later, then reduced to 98.6. All needs met at this time.

## 2017-09-17 NOTE — PROGRESS NOTES
Pt rounded on hourly. C/o headache early in shift. Medicated per MAR. IV infiltrated after vancomycin and ampicillin completed. Awaiting PICC placement. All needs met at this time.

## 2017-09-17 NOTE — PROGRESS NOTES
Urology Progress Note    Subjective:     Daily Progress Note: 2017 12:51 PM    The patient had fever to 101 last night but is now afebrile. She's feeling relatively well. There is no flank pain or nausea. Objective:     Visit Vitals    /71 (BP 1 Location: Left arm, BP Patient Position: At rest;Sitting)    Pulse 80    Temp 98.3 °F (36.8 °C)    Resp 18    Ht 5' 3\" (1.6 m)    Wt 152 lb (68.9 kg)    SpO2 98%    BMI 26.93 kg/m2        Temp (24hrs), Av °F (37.2 °C), Min:98.1 °F (36.7 °C), Max:101 °F (38.3 °C)      Intake and Output:          Physical Exam: No distress. Abdomen:Soft, non-tender, active bowel sounds,Bladder is not palpable. Assessment/Plan:     Principal Problem:    Left ureteral stone (2017)        Plan:  The patient will need long-term IV therapy so we will go ahead and place a PICC line today. Continue vancomycin.       Signed By: Mima Mustafa MD                         2017

## 2017-09-18 ENCOUNTER — HOME HEALTH ADMISSION (OUTPATIENT)
Dept: HOME HEALTH SERVICES | Facility: HOME HEALTH | Age: 42
End: 2017-09-18
Payer: SUBSIDIZED

## 2017-09-18 VITALS
DIASTOLIC BLOOD PRESSURE: 88 MMHG | TEMPERATURE: 98.9 F | HEIGHT: 63 IN | RESPIRATION RATE: 18 BRPM | WEIGHT: 152 LBS | HEART RATE: 90 BPM | BODY MASS INDEX: 26.93 KG/M2 | OXYGEN SATURATION: 97 % | SYSTOLIC BLOOD PRESSURE: 122 MMHG

## 2017-09-18 LAB
BACTERIA SPEC CULT: NORMAL
BASOPHILS # BLD: 0 K/UL (ref 0–0.2)
BASOPHILS NFR BLD: 1 % (ref 0–2)
CREAT SERPL-MCNC: 0.73 MG/DL (ref 0.6–1)
DIFFERENTIAL METHOD BLD: ABNORMAL
EOSINOPHIL # BLD: 0.1 K/UL (ref 0–0.8)
EOSINOPHIL NFR BLD: 2 % (ref 0.5–7.8)
ERYTHROCYTE [DISTWIDTH] IN BLOOD BY AUTOMATED COUNT: 12.6 % (ref 11.9–14.6)
HCT VFR BLD AUTO: 30.5 % (ref 35.8–46.3)
HGB BLD-MCNC: 10.9 G/DL (ref 11.7–15.4)
IMM GRANULOCYTES # BLD: 0.1 K/UL (ref 0–0.5)
IMM GRANULOCYTES NFR BLD: 0.8 % (ref 0–5)
LYMPHOCYTES # BLD: 0.9 K/UL (ref 0.5–4.6)
LYMPHOCYTES NFR BLD: 15 % (ref 13–44)
MCH RBC QN AUTO: 30.5 PG (ref 26.1–32.9)
MCHC RBC AUTO-ENTMCNC: 35.7 G/DL (ref 31.4–35)
MCV RBC AUTO: 85.4 FL (ref 79.6–97.8)
MONOCYTES # BLD: 1.1 K/UL (ref 0.1–1.3)
MONOCYTES NFR BLD: 17 % (ref 4–12)
NEUTS SEG # BLD: 4.1 K/UL (ref 1.7–8.2)
NEUTS SEG NFR BLD: 64 % (ref 43–78)
PLATELET # BLD AUTO: 201 K/UL (ref 150–450)
PMV BLD AUTO: 9.7 FL (ref 10.8–14.1)
RBC # BLD AUTO: 3.57 M/UL (ref 4.05–5.25)
SERVICE CMNT-IMP: NORMAL
WBC # BLD AUTO: 6.3 K/UL (ref 4.3–11.1)

## 2017-09-18 PROCEDURE — 77030018719 HC DRSG PTCH ANTIMIC J&J -A

## 2017-09-18 PROCEDURE — 76937 US GUIDE VASCULAR ACCESS: CPT

## 2017-09-18 PROCEDURE — 74011000258 HC RX REV CODE- 258: Performed by: INTERNAL MEDICINE

## 2017-09-18 PROCEDURE — 36415 COLL VENOUS BLD VENIPUNCTURE: CPT | Performed by: NURSE PRACTITIONER

## 2017-09-18 PROCEDURE — 85025 COMPLETE CBC W/AUTO DIFF WBC: CPT | Performed by: NURSE PRACTITIONER

## 2017-09-18 PROCEDURE — 82565 ASSAY OF CREATININE: CPT | Performed by: NURSE PRACTITIONER

## 2017-09-18 PROCEDURE — 74011250636 HC RX REV CODE- 250/636: Performed by: INTERNAL MEDICINE

## 2017-09-18 PROCEDURE — 74011250637 HC RX REV CODE- 250/637: Performed by: UROLOGY

## 2017-09-18 PROCEDURE — 77030018786 HC NDL GD F/USND BARD -B

## 2017-09-18 PROCEDURE — 74011250636 HC RX REV CODE- 250/636: Performed by: NURSE PRACTITIONER

## 2017-09-18 PROCEDURE — 74011250636 HC RX REV CODE- 250/636: Performed by: UROLOGY

## 2017-09-18 PROCEDURE — C1751 CATH, INF, PER/CENT/MIDLINE: HCPCS

## 2017-09-18 RX ORDER — HEPARIN 100 UNIT/ML
600 SYRINGE INTRAVENOUS AS NEEDED
Status: DISCONTINUED | OUTPATIENT
Start: 2017-09-18 | End: 2017-09-18 | Stop reason: HOSPADM

## 2017-09-18 RX ORDER — HEPARIN 100 UNIT/ML
600 SYRINGE INTRAVENOUS EVERY 8 HOURS
Status: DISCONTINUED | OUTPATIENT
Start: 2017-09-18 | End: 2017-09-18 | Stop reason: HOSPADM

## 2017-09-18 RX ORDER — SODIUM CHLORIDE 0.9 % (FLUSH) 0.9 %
20 SYRINGE (ML) INJECTION AS NEEDED
Status: DISCONTINUED | OUTPATIENT
Start: 2017-09-18 | End: 2017-09-18 | Stop reason: HOSPADM

## 2017-09-18 RX ORDER — SODIUM CHLORIDE 0.9 % (FLUSH) 0.9 %
20 SYRINGE (ML) INJECTION EVERY 8 HOURS
Status: DISCONTINUED | OUTPATIENT
Start: 2017-09-18 | End: 2017-09-18 | Stop reason: HOSPADM

## 2017-09-18 RX ADMIN — Medication 10 ML: at 05:27

## 2017-09-18 RX ADMIN — SODIUM CHLORIDE, PRESERVATIVE FREE 600 UNITS: 5 INJECTION INTRAVENOUS at 15:05

## 2017-09-18 RX ADMIN — PENICILLIN G POTASSIUM 24 MILLION UNITS: 20000000 INJECTION, POWDER, FOR SOLUTION INTRAVENOUS at 15:03

## 2017-09-18 RX ADMIN — Medication 10 ML: at 15:06

## 2017-09-18 RX ADMIN — ATENOLOL 25 MG: 50 TABLET ORAL at 09:56

## 2017-09-18 RX ADMIN — HYDROCODONE BITARTRATE AND ACETAMINOPHEN 1 TABLET: 5; 325 TABLET ORAL at 15:03

## 2017-09-18 RX ADMIN — DOCUSATE SODIUM 100 MG: 100 CAPSULE, LIQUID FILLED ORAL at 09:56

## 2017-09-18 RX ADMIN — AMPICILLIN 2 G: 2 INJECTION, POWDER, FOR SOLUTION INTRAVENOUS at 05:23

## 2017-09-18 RX ADMIN — IBUPROFEN 800 MG: 800 TABLET, FILM COATED ORAL at 15:03

## 2017-09-18 RX ADMIN — VANCOMYCIN HYDROCHLORIDE 1750 MG: 10 INJECTION, POWDER, LYOPHILIZED, FOR SOLUTION INTRAVENOUS at 00:49

## 2017-09-18 NOTE — DISCHARGE INSTRUCTIONS
DISCHARGE SUMMARY from Nurse    The following personal items are in your possession at time of discharge:    Dental Appliances: None        Home Medications: None  Jewelry: None  Clothing: At bedside, Footwear, Robe, Shirt, Shorts  Other Valuables: Cell Phone             PATIENT INSTRUCTIONS:    After general anesthesia or intravenous sedation, for 24 hours or while taking prescription Narcotics:  · Limit your activities  · Do not drive and operate hazardous machinery  · Do not make important personal or business decisions  · Do  not drink alcoholic beverages  · If you have not urinated within 8 hours after discharge, please contact your surgeon on call. Report the following to your surgeon:  · Excessive pain, swelling, redness or odor of or around the surgical area  · Temperature over 100.5  · Nausea and vomiting lasting longer than 4 hours or if unable to take medications  · Any signs of decreased circulation or nerve impairment to extremity: change in color, persistent  numbness, tingling, coldness or increase pain  · Any questions        What to do at Home:  Recommended activity: Activity as tolerated    If you experience any of the following symptoms increased pain, fever greater than 101, nausea/vomiting, or difficulty urinating, please follow up with Dr. Darline Andrews. *  Please give a list of your current medications to your Primary Care Provider. *  Please update this list whenever your medications are discontinued, doses are      changed, or new medications (including over-the-counter products) are added. *  Please carry medication information at all times in case of emergency situations. These are general instructions for a healthy lifestyle:    No smoking/ No tobacco products/ Avoid exposure to second hand smoke    Surgeon General's Warning:  Quitting smoking now greatly reduces serious risk to your health.     Obesity, smoking, and sedentary lifestyle greatly increases your risk for illness    A healthy diet, regular physical exercise & weight monitoring are important for maintaining a healthy lifestyle    You may be retaining fluid if you have a history of heart failure or if you experience any of the following symptoms:  Weight gain of 3 pounds or more overnight or 5 pounds in a week, increased swelling in our hands or feet or shortness of breath while lying flat in bed. Please call your doctor as soon as you notice any of these symptoms; do not wait until your next office visit. Recognize signs and symptoms of STROKE:    F-face looks uneven    A-arms unable to move or move unevenly    S-speech slurred or non-existent    T-time-call 911 as soon as signs and symptoms begin-DO NOT go       Back to bed or wait to see if you get better-TIME IS BRAIN. Warning Signs of HEART ATTACK     Call 911 if you have these symptoms:   Chest discomfort. Most heart attacks involve discomfort in the center of the chest that lasts more than a few minutes, or that goes away and comes back. It can feel like uncomfortable pressure, squeezing, fullness, or pain.  Discomfort in other areas of the upper body. Symptoms can include pain or discomfort in one or both arms, the back, neck, jaw, or stomach.  Shortness of breath with or without chest discomfort.  Other signs may include breaking out in a cold sweat, nausea, or lightheadedness. Don't wait more than five minutes to call 911 - MINUTES MATTER! Fast action can save your life. Calling 911 is almost always the fastest way to get lifesaving treatment. Emergency Medical Services staff can begin treatment when they arrive -- up to an hour sooner than if someone gets to the hospital by car. The discharge information has been reviewed with the patient. The patient verbalized understanding. Discharge medications reviewed with the patient and appropriate educational materials and side effects teaching were provided.

## 2017-09-18 NOTE — PROGRESS NOTES
Admit Date: 9/13/2017    Subjective:     Patient has no new complaint. Objective:     Patient Vitals for the past 8 hrs:   BP Temp Pulse Resp SpO2   09/18/17 0735 136/81 98.9 °F (37.2 °C) 82 18 98 %   09/18/17 0050 129/82 98.5 °F (36.9 °C) 73 18 98 %             Physical Exam: benign exam        Data Review   Recent Results (from the past 24 hour(s))   VANCOMYCIN, TROUGH    Collection Time: 09/17/17  8:54 PM   Result Value Ref Range    Vancomycin,trough 22.6 (HH) 5 - 20 ug/mL   CBC WITH AUTOMATED DIFF    Collection Time: 09/18/17  5:22 AM   Result Value Ref Range    WBC 6.3 4.3 - 11.1 K/uL    RBC 3.57 (L) 4.05 - 5.25 M/uL    HGB 10.9 (L) 11.7 - 15.4 g/dL    HCT 30.5 (L) 35.8 - 46.3 %    MCV 85.4 79.6 - 97.8 FL    MCH 30.5 26.1 - 32.9 PG    MCHC 35.7 (H) 31.4 - 35.0 g/dL    RDW 12.6 11.9 - 14.6 %    PLATELET 661 175 - 279 K/uL    MPV 9.7 (L) 10.8 - 14.1 FL    DF AUTOMATED      NEUTROPHILS 64 43 - 78 %    LYMPHOCYTES 15 13 - 44 %    MONOCYTES 17 (H) 4.0 - 12.0 %    EOSINOPHILS 2 0.5 - 7.8 %    BASOPHILS 1 0.0 - 2.0 %    IMMATURE GRANULOCYTES 0.8 0.0 - 5.0 %    ABS. NEUTROPHILS 4.1 1.7 - 8.2 K/UL    ABS. LYMPHOCYTES 0.9 0.5 - 4.6 K/UL    ABS. MONOCYTES 1.1 0.1 - 1.3 K/UL    ABS. EOSINOPHILS 0.1 0.0 - 0.8 K/UL    ABS. BASOPHILS 0.0 0.0 - 0.2 K/UL    ABS. IMM. GRANS. 0.1 0.0 - 0.5 K/UL   CREATININE    Collection Time: 09/18/17  5:22 AM   Result Value Ref Range    Creatinine 0.73 0.6 - 1.0 MG/DL           Assessment:     Principal Problem:    Left ureteral stone (9/13/2017)    S/P stent    Gram + bacteremia    Plan:       Home hopefully later today. Home whenever PICC placed and home IV antibiotic ordered by ID and set up by Homehealth. To OR as outpatient either later this week or early next for cystoscopy, LEFT ureteroscopy, laser lithotripsy, LEFT ureteral stent exchange.       Kalina Recio MD

## 2017-09-18 NOTE — PROGRESS NOTES
PICC Placement Note    PRE-PROCEDURE VERIFICATION  Correct Procedure: yes  Correct Site:  Yes  Timeout performed with this nurse, the patient and Faith Wall RN, VAT  Temperature: Temp: 98.2 °F (36.8 °C), Temperature Source: Temp Source: Oral  Recent Labs      09/18/17   0522   CREA  0.73   PLT  201   WBC  6.3     Allergies: Ciprofloxacin  Education materials for [facility] given to patient: yes. See Patient Education activity for further details. PICC Booklet given to patient. PROCEDURE DETAIL  A double lumen PICC line was started for antibiotic therapy. The following documentation is in addition to the PICC properties in the lines/airways flowsheet :  Lot #: [id number]  xylocaine used: yes  Mid-Arm Circumference: 29 (cm)  Internal Catheter Length: 37 (cm)  Internal Catheter Total Length: 37 (cm)  Vein Selection for PICC:right brachial  Central Line Bundle followed yes  Complication Related to Insertion: none    The placement was verified by X-ray: no. The location of the tip of the PICC is verified using ECG technology. The tip of the PICC is in the SVC per ECG reading. See images below.   Line is okay to use: yes    Penny Graham RN

## 2017-09-18 NOTE — PROGRESS NOTES
[address]  Face to Face Encounter    Patients Name: Tequila Hall    YOB: 1975    Ordering Physician: Tamia Broussard    Primary Diagnosis: Left ureteral stone  ureteral stone    Date of Face to Face:   9/18/2017                                  Face to Face Encounter findings are related to primary reason for home care:   yes. 1. I certify that the patient needs intermittent care as follows: skilled nursing care:  teaching/training of IV antibx/ picc line care    2. I certify that this patient is homebound, that is: 1) patient requires the use of a N/A device, special transportation, or assistance of another to leave the home; or 2) patient's condition makes leaving the home medically contraindicated; and 3) patient has a normal inability to leave the home and leaving the home requires considerable and taxing effort. Patient may leave the home for infrequent and short duration for medical reasons, and occasional absences for non-medical reasons. Homebound status is due to the following functional limitations: Patient with strength deficits limiting the performance of all ADL's without caregiver assistance or the use of an assistive device. 3. I certify that this patient is under my care and that I, or a nurse practitioner or  [id number], or clinical nurse specialist, or certified nurse midwife, working with me, had a Face-to-Face Encounter that meets the physician Face-to-Face Encounter requirements. The following are the clinical findings from the [address] [place] [address] encounter that support the need for skilled services and is a summary of the encounter:     See Hospital chart      [facility]  9/18/2017      THE FOLLOWING TO BE COMPLETED BY THE COMMUNITY PHYSICIAN:    I concur with the findings described above from the [facility] encounter that this patient is homebound and in need of a skilled service.     Certifying Physician: _____________________________________ Printed Certifying Physician Name: _____________________________________    Date: _________________

## 2017-09-18 NOTE — PROGRESS NOTES
Infectious Disease Progress note    Today's Date: 2017   Admit Date: 2017    Impression:   · E faecalis bacteremia , BC  NTD, TTE negative. [place] not indicated, source urinary  · Urosepsis with associated nephrolithiasis and hydronephrosis/hydroureter s/p cystoscopy/Left ureteral stent placement: purulent urine, cx E.faecalis. Plans for stent exchange, lithotripsy later this week/next week  · Hx of bladder sling 2016, frequent UTIs since then  · Cipro allergy: joint pain     Plan:   · Discontinue Vanc and Ampicillin  · Start PCN 24million units IV via continuous infusion and plan to treat for 14 days, EOT 17, with possible additional 2 weeks of Augmentin PO at EOT   · Plan for repeat cysto/stent exchange and lithotripsy planned within next week or so  · ID follow up 17 @ 2:20pm  · OPAT orders sent, CM to assist    Anti-infectives:   Ceftriaxone -9/15  Vancomycin -  Ampicillin 9/15-  PCN CI (-    Subjective:   Resting in bed, feeling much better. No fever, chills, sweat, or dysuria    Allergies   Allergen Reactions    Ciprofloxacin Other (comments)     \"joint pain\"        Review of Systems:  A comprehensive review of systems was negative except for that written in the History of Present Illness. Objective:     Visit Vitals    /82    Pulse 72    Temp 98.2 °F (36.8 °C)    Resp 18    Ht 5' 3\" (1.6 m)    Wt 68.9 kg (152 lb)    SpO2 96%    BMI 26.93 kg/m2     Temp (24hrs), Av.8 °F (37.1 °C), Min:98 °F (36.7 °C), Max:100.3 °F (37.9 °C)       Lines:  Peripheral IV:  Left AC intact          Physical Exam:    General:  Alert, cooperative, well noursished, well developed, appears stated age   Eyes:  Sclera anicteric. Pupils equally round and reactive to light.    Mouth/Throat: Mucous membranes normal, oral pharynx clear   Neck: Supple   Lungs:   Clear to auscultation bilaterally, good effort   CV:  Regular rate and rhythm,no murmur, click, rub or gallop Abdomen:   Soft, non-tender. bowel sounds normal. non-distended   Extremities: No cyanosis or edema   Skin: Skin color, texture, turgor normal. no acute rash or lesions   Musculoskeletal: No swelling or deformity   Lines/Devices:  Intact, no erythema, drainage or tenderness   Psych: Alert and oriented, normal mood affect given the setting         Data Review:     CBC:  Recent Labs      09/18/17 0522 09/16/17 0637   WBC  6.3  12.8*   GRANS  64  89*   MONOS  17*  6   EOS  2  0*   ANEU  4.1  11.3*   ABL  0.9  0.7   HGB  10.9*  10.7*   HCT  30.5*  30.1*   PLT  201  153       BMP:  Recent Labs      09/18/17 0522 09/16/17 0637   CREA  0.73  0.86       LFTS:  No results for input(s): TBILI, ALT, SGOT, AP, TP, ALB in the last 72 hours.     Microbiology:     All Micro Results     Procedure Component Value Units Date/Time    CULTURE, BLOOD [[id number]] Collected:  09/16/17 0637    Order Status:  Completed Specimen:  Whole Blood from Blood Updated:  09/18/17 0650     Special Requests: --        LEFT  HAND       Culture result: NO GROWTH 2 DAYS       CULTURE, BLOOD [[id number]] Collected:  09/16/17 2563    Order Status:  Completed Specimen:  Whole Blood from Blood Updated:  09/18/17 0650     Special Requests: --        LEFT  Antecubital       Culture result: NO GROWTH 2 DAYS       CULTURE, BLOOD [[id number]] Collected:  09/13/17 2115    Order Status:  Completed Specimen:  Blood from Blood Updated:  09/18/17 0650     Special Requests: --        LEFT  Antecubital       Culture result: NO GROWTH 5 DAYS       CULTURE, URINE [[id number]]  (Abnormal)  (Susceptibility) Collected:  09/14/17 0420    Order Status:  Completed Specimen:  Urine Updated:  09/17/17 0749     Special Requests: NO SPECIAL REQUESTS        Culture result:         >100,000 COLONIES/mL ENTEROCOCCUS FAECALIS GROUP D (A)              94785 COLONIES/mL MIXED SKIN RACHEL ISOLATED    CULTURE, URINE [[id number]]  (Abnormal)  (Susceptibility) Collected:  09/13/17 7260 Order Status:  Completed Specimen:  Urine from Cath Urine Updated:  09/16/17 0651     Special Requests: NO SPECIAL REQUESTS        Culture result:         >100,000 COLONIES/mL ENTEROCOCCUS FAECALIS GROUP D (A)              41035 COLONIES/mL MIXED SKIN RACHEL ISOLATED    CULTURE, BLOOD [[id number]]  (Abnormal)  (Susceptibility) Collected:  09/13/17 1942    Order Status:  Completed Specimen:  Blood from Blood Updated:  09/16/17 0609     Special Requests: --        LEFT  Antecubital       GRAM STAIN GRAM POSITIVE COCCI         AEROBIC BOTTLE POSITIVE                 RESULTS VERIFIED, PHONED TO AND READ BACK BY Estefany Fuentes AT 9682 ON 9.14.17 BY CLARA     Culture result:         ENTEROCOCCUS FAECALIS GROUP D (A)          Imaging:   CT urogram: see HPI    Signed By: Jonah Neumann NP     September 18, 2017

## 2017-09-18 NOTE — ANTIMICROBIAL STEWARDSHIP
Patient with e faecalis UTI and subsequent bacteremia being treated with IV ampicillin 2 g Q6H and IV Vancomycin 1750 mg Q12H. Echo was negative for vegetation, so endocarditis ruled out at this time. Anticipate patient will need 14 days of therapy for bacteremia (starting from last negative culture drawn 9/16/17). Blood cultures negative X 48 hours, so may be able to place long term IV access today. Per social work, patient is self pay and going to infusion center for Q6H ampicillin or even Q12H vancomycin may not be feasible. As e faecalis strain sensitive to penicillin, might consider continuous infusion Penicillin G as a treatment option if this can be arranged. Plan to discuss with ID today.     Thank you,  Vi Robles, PharmD  Clinical Pharmacist  [phone]

## 2017-09-18 NOTE — PROGRESS NOTES
Pharmacokinetic Consult to Pharmacist    Tequila Rafael is a 35 y.o. female being treated for bacteremia and UTI. TTE negative for vegetations    Height: 5' 3\" (160 cm)  Weight: 68.9 kg (152 lb)  Lab Results   Component Value Date/Time    BUN 14 09/15/2017 05:39 AM    Creatinine 0.86 09/16/2017 06:37 AM    WBC 12.8 09/16/2017 06:37 AM    Procalcitonin <0.1 09/13/2017 07:32 PM    Lactic Acid (POC) 1.4 09/13/2017 09:20 PM      Estimated Creatinine Clearance: 78.6 mL/min (based on Cr of 0.86). Lab Results   Component Value Date/Time    Vancomycin,trough 22.6 09/17/2017 08:54 PM       Day 4 of vancomycin. Goal trough is 15-20. Trough = 22.6 prior to 4th dose of 1g q 8hr. Will change dose to 1750 mg q 12hr based on prior dosing and troughs. Delaying next dose until 2am to let trough drop and to try to get new IV line. Will continue to follow patient.       Thank you,  Ramon Nelson, PharmD  Clinical Pharmacist  [phone]

## 2017-09-18 NOTE — PROGRESS NOTES
Discharge instructions and prescriptions given and reviewed with pt, verbalizes understanding, pt to be discharged home, waiting on intramed infusion pump with medication to be delivered to room.

## 2017-09-19 ENCOUNTER — HOME CARE VISIT (OUTPATIENT)
Dept: HOME HEALTH SERVICES | Facility: HOME HEALTH | Age: 42
End: 2017-09-19

## 2017-09-19 ENCOUNTER — HOME CARE VISIT (OUTPATIENT)
Dept: SCHEDULING | Facility: HOME HEALTH | Age: 42
End: 2017-09-19

## 2017-09-19 VITALS
TEMPERATURE: 97.6 F | RESPIRATION RATE: 16 BRPM | DIASTOLIC BLOOD PRESSURE: 80 MMHG | HEART RATE: 88 BPM | OXYGEN SATURATION: 99 % | SYSTOLIC BLOOD PRESSURE: 122 MMHG

## 2017-09-19 PROCEDURE — G0299 HHS/HOSPICE OF RN EA 15 MIN: HCPCS

## 2017-09-19 NOTE — DISCHARGE SUMMARY
Discharge Summary     Patient: Tiburcio Looney MRN: [id number]  SSN: [id number]    YOB: 1975  Age: 35 y.o. Sex: female      Admit Date: 9/13/2017    Discharge Date: 9/18/17     * Admission Diagnoses:  Left ureteral stone;ureteral stone     * Discharge Diagnoses:   Hospital Problems as of 9/18/2017  Date Reviewed: 9/14/2017          Codes Class Noted - Resolved POA    * (Principal)Left ureteral stone ICD-10-CM: N20.1  ICD-9-CM: 592.1  9/13/2017 - Present Unknown           2. Gram + bacteremia    * Procedures for this admission:   Procedure(s):  CYSTOSCOPY LEFT URETERAL STENT INSERTION       * Disposition: Home    * Discharged Condition: improved    * Hospital Course: To OR emergently for LEFT ureteral stent placement. Blood cultures and urine culture grew Enterococcus. ID consulted. TTE negative. Home on IV PCN per ID. Discharge Medications:   Discharge Medication List as of 9/18/2017  4:44 PM           * Follow-up Care/Patient Instructions: Activity: Activity as tolerated  Diet: Regular Diet  Wound Care: None needed    Follow UP: will be scheduled for cystoscopy, LEFT ureteroscopy, laser lithotripsy, LEFT ureteral stent exchange in a week or so while still on PCN.      Follow-up Information     Follow up With Details Comments Contact Info    London Early NP On 9/28/2017 at 2:20 [address] [place] [address]  [address] [place] [address], [address] [place] [id number]  [address]  will manage PICC line [facility] [address] [place] [address] [address] [address]  [phone]    INTRAMED PLUS  for IV antibiotics [address] [address] [phone]    Lobo Arenas MD  office will call you [address] [place] [address]  [phone]             Signed By: Lobo Arenas MD     September 19, 2017

## 2017-09-21 ENCOUNTER — HOSPITAL ENCOUNTER (OUTPATIENT)
Dept: SURGERY | Age: 42
Discharge: HOME | End: 2017-09-21

## 2017-09-21 VITALS
DIASTOLIC BLOOD PRESSURE: 67 MMHG | RESPIRATION RATE: 16 BRPM | BODY MASS INDEX: 26.68 KG/M2 | WEIGHT: 150.56 LBS | SYSTOLIC BLOOD PRESSURE: 120 MMHG | HEIGHT: 63 IN | HEART RATE: 76 BPM | OXYGEN SATURATION: 99 % | TEMPERATURE: 98.9 F

## 2017-09-21 LAB
BACTERIA SPEC CULT: NORMAL
BACTERIA SPEC CULT: NORMAL
SERVICE CMNT-IMP: NORMAL
SERVICE CMNT-IMP: NORMAL

## 2017-09-21 NOTE — PERIOP NOTES
Patient verified name, , and surgery as listed in [facility]. Patient provided medical/health information and PTA medications to the best of their ability. TYPE  CASE:1b  Orders per surgeon: were Received  Labs per surgeon: none. Labs per anesthesia protocol: none. EKG  :  n/a    Patient provided with and instructed on education handouts including Guide to Surgery, blood transfusions, pain management, and hand hygiene for the family and community, and [facility] brochure. Fany Mist and instructions given per hospital policy. Instructed patient to continue previous medications as prescribed prior to surgery unless otherwise directed and to take the following medications the day of surgery according to anesthesia guidelines : Atenolol, Wellbutrin . Instructed patient to hold  the following medications: none. Original medication prescription bottles were not visualized during patient appointment. Patient teach back successful and patient demonstrates knowledge of instruction.

## 2017-09-21 NOTE — PERIOP NOTES
Patient is a no show. Left voicemail for patient to call surgery scheduler to reschedule appt. Notified Irma Woodson, unit secretary that patient was a no show.

## 2017-09-24 ENCOUNTER — ANESTHESIA EVENT (OUTPATIENT)
Dept: SURGERY | Age: 42
End: 2017-09-24
Payer: SUBSIDIZED

## 2017-09-25 ENCOUNTER — ANESTHESIA (OUTPATIENT)
Dept: SURGERY | Age: 42
End: 2017-09-25
Payer: SUBSIDIZED

## 2017-09-25 ENCOUNTER — HOSPITAL ENCOUNTER (OUTPATIENT)
Age: 42
Setting detail: OUTPATIENT SURGERY
Discharge: HOME | End: 2017-09-25
Attending: UROLOGY | Admitting: UROLOGY
Payer: SUBSIDIZED

## 2017-09-25 VITALS
OXYGEN SATURATION: 95 % | HEIGHT: 63 IN | BODY MASS INDEX: 26.45 KG/M2 | TEMPERATURE: 98.5 F | DIASTOLIC BLOOD PRESSURE: 64 MMHG | WEIGHT: 149.25 LBS | RESPIRATION RATE: 11 BRPM | HEART RATE: 62 BPM | SYSTOLIC BLOOD PRESSURE: 110 MMHG

## 2017-09-25 PROCEDURE — 76210000006 HC OR PH I REC 0.5 TO 1 HR: Performed by: UROLOGY

## 2017-09-25 PROCEDURE — 74011000250 HC RX REV CODE- 250: Performed by: ANESTHESIOLOGY

## 2017-09-25 PROCEDURE — 77030008477 HC STYL SATN SLP COVD -A: Performed by: ANESTHESIOLOGY

## 2017-09-25 PROCEDURE — 77030032490 HC SLV COMPR SCD KNE COVD -B: Performed by: UROLOGY

## 2017-09-25 PROCEDURE — 77030033647: Performed by: UROLOGY

## 2017-09-25 PROCEDURE — 77030020782 HC GWN BAIR PAWS FLX 3M -B: Performed by: ANESTHESIOLOGY

## 2017-09-25 PROCEDURE — 76210000026 HC REC RM PH II 1 TO 1.5 HR: Performed by: UROLOGY

## 2017-09-25 PROCEDURE — 76010000161 HC OR TIME 1 TO 1.5 HR INTENSV-TIER 1: Performed by: UROLOGY

## 2017-09-25 PROCEDURE — C2617 STENT, NON-COR, TEM W/O DEL: HCPCS | Performed by: UROLOGY

## 2017-09-25 PROCEDURE — 77030019927 HC TBNG IRR CYSTO BAXT -A: Performed by: UROLOGY

## 2017-09-25 PROCEDURE — 77030018832 HC SOL IRR H20 ICUM -A: Performed by: UROLOGY

## 2017-09-25 PROCEDURE — 74011250636 HC RX REV CODE- 250/636: Performed by: ANESTHESIOLOGY

## 2017-09-25 PROCEDURE — 74011250636 HC RX REV CODE- 250/636

## 2017-09-25 PROCEDURE — 74011000250 HC RX REV CODE- 250

## 2017-09-25 PROCEDURE — 77030008703 HC TU ET UNCUF COVD -A: Performed by: ANESTHESIOLOGY

## 2017-09-25 PROCEDURE — 76060000033 HC ANESTHESIA 1 TO 1.5 HR: Performed by: UROLOGY

## 2017-09-25 PROCEDURE — 77030035011 HC LSR FBR HOLM FLXIVA TRAC TIP BSC -F: Performed by: UROLOGY

## 2017-09-25 PROCEDURE — C1769 GUIDE WIRE: HCPCS | Performed by: UROLOGY

## 2017-09-25 DEVICE — URETERAL STENT
Type: IMPLANTABLE DEVICE | Site: URETER | Status: FUNCTIONAL
Brand: PERCUFLEX™ PLUS

## 2017-09-25 RX ORDER — HYDROMORPHONE HYDROCHLORIDE 2 MG/ML
0.5 INJECTION, SOLUTION INTRAMUSCULAR; INTRAVENOUS; SUBCUTANEOUS
Status: DISCONTINUED | OUTPATIENT
Start: 2017-09-25 | End: 2017-09-25 | Stop reason: HOSPADM

## 2017-09-25 RX ORDER — PROPOFOL 10 MG/ML
INJECTION, EMULSION INTRAVENOUS AS NEEDED
Status: DISCONTINUED | OUTPATIENT
Start: 2017-09-25 | End: 2017-09-25 | Stop reason: HOSPADM

## 2017-09-25 RX ORDER — FLUMAZENIL 0.1 MG/ML
0.2 INJECTION INTRAVENOUS
Status: DISCONTINUED | OUTPATIENT
Start: 2017-09-25 | End: 2017-09-25 | Stop reason: HOSPADM

## 2017-09-25 RX ORDER — GLYCOPYRROLATE 0.2 MG/ML
INJECTION INTRAMUSCULAR; INTRAVENOUS AS NEEDED
Status: DISCONTINUED | OUTPATIENT
Start: 2017-09-25 | End: 2017-09-25 | Stop reason: HOSPADM

## 2017-09-25 RX ORDER — OXYCODONE HYDROCHLORIDE 5 MG/1
5 TABLET ORAL
Status: DISCONTINUED | OUTPATIENT
Start: 2017-09-25 | End: 2017-09-25 | Stop reason: HOSPADM

## 2017-09-25 RX ORDER — DEXAMETHASONE SODIUM PHOSPHATE 4 MG/ML
INJECTION, SOLUTION INTRA-ARTICULAR; INTRALESIONAL; INTRAMUSCULAR; INTRAVENOUS; SOFT TISSUE AS NEEDED
Status: DISCONTINUED | OUTPATIENT
Start: 2017-09-25 | End: 2017-09-25 | Stop reason: HOSPADM

## 2017-09-25 RX ORDER — NEOSTIGMINE METHYLSULFATE 1 MG/ML
INJECTION INTRAVENOUS AS NEEDED
Status: DISCONTINUED | OUTPATIENT
Start: 2017-09-25 | End: 2017-09-25 | Stop reason: HOSPADM

## 2017-09-25 RX ORDER — ONDANSETRON 2 MG/ML
INJECTION INTRAMUSCULAR; INTRAVENOUS AS NEEDED
Status: DISCONTINUED | OUTPATIENT
Start: 2017-09-25 | End: 2017-09-25 | Stop reason: HOSPADM

## 2017-09-25 RX ORDER — SODIUM CHLORIDE 0.9 % (FLUSH) 0.9 %
5-10 SYRINGE (ML) INJECTION EVERY 8 HOURS
Status: DISCONTINUED | OUTPATIENT
Start: 2017-09-25 | End: 2017-09-25 | Stop reason: HOSPADM

## 2017-09-25 RX ORDER — SODIUM CHLORIDE, SODIUM LACTATE, POTASSIUM CHLORIDE, CALCIUM CHLORIDE 600; 310; 30; 20 MG/100ML; MG/100ML; MG/100ML; MG/100ML
100 INJECTION, SOLUTION INTRAVENOUS CONTINUOUS
Status: DISCONTINUED | OUTPATIENT
Start: 2017-09-25 | End: 2017-09-25 | Stop reason: HOSPADM

## 2017-09-25 RX ORDER — ROCURONIUM BROMIDE 10 MG/ML
INJECTION, SOLUTION INTRAVENOUS AS NEEDED
Status: DISCONTINUED | OUTPATIENT
Start: 2017-09-25 | End: 2017-09-25 | Stop reason: HOSPADM

## 2017-09-25 RX ORDER — SODIUM CHLORIDE 0.9 % (FLUSH) 0.9 %
5-10 SYRINGE (ML) INJECTION AS NEEDED
Status: DISCONTINUED | OUTPATIENT
Start: 2017-09-25 | End: 2017-09-25 | Stop reason: HOSPADM

## 2017-09-25 RX ORDER — MIDAZOLAM HYDROCHLORIDE 1 MG/ML
2 INJECTION, SOLUTION INTRAMUSCULAR; INTRAVENOUS
Status: DISCONTINUED | OUTPATIENT
Start: 2017-09-25 | End: 2017-09-25 | Stop reason: HOSPADM

## 2017-09-25 RX ORDER — NALOXONE HYDROCHLORIDE 0.4 MG/ML
0.1 INJECTION, SOLUTION INTRAMUSCULAR; INTRAVENOUS; SUBCUTANEOUS
Status: DISCONTINUED | OUTPATIENT
Start: 2017-09-25 | End: 2017-09-25 | Stop reason: HOSPADM

## 2017-09-25 RX ORDER — FENTANYL CITRATE 50 UG/ML
INJECTION, SOLUTION INTRAMUSCULAR; INTRAVENOUS AS NEEDED
Status: DISCONTINUED | OUTPATIENT
Start: 2017-09-25 | End: 2017-09-25 | Stop reason: HOSPADM

## 2017-09-25 RX ORDER — NALBUPHINE HYDROCHLORIDE 20 MG/ML
5 INJECTION, SOLUTION INTRAMUSCULAR; INTRAVENOUS; SUBCUTANEOUS
Status: DISCONTINUED | OUTPATIENT
Start: 2017-09-25 | End: 2017-09-25 | Stop reason: HOSPADM

## 2017-09-25 RX ORDER — ONDANSETRON 2 MG/ML
4 INJECTION INTRAMUSCULAR; INTRAVENOUS ONCE
Status: DISCONTINUED | OUTPATIENT
Start: 2017-09-25 | End: 2017-09-25 | Stop reason: HOSPADM

## 2017-09-25 RX ORDER — LIDOCAINE HYDROCHLORIDE 10 MG/ML
0.1 INJECTION INFILTRATION; PERINEURAL AS NEEDED
Status: DISCONTINUED | OUTPATIENT
Start: 2017-09-25 | End: 2017-09-25 | Stop reason: HOSPADM

## 2017-09-25 RX ADMIN — SODIUM CHLORIDE, SODIUM LACTATE, POTASSIUM CHLORIDE, AND CALCIUM CHLORIDE 100 ML/HR: 600; 310; 30; 20 INJECTION, SOLUTION INTRAVENOUS at 07:19

## 2017-09-25 RX ADMIN — HYDROMORPHONE HYDROCHLORIDE 0.5 MG: 2 INJECTION, SOLUTION INTRAMUSCULAR; INTRAVENOUS; SUBCUTANEOUS at 10:16

## 2017-09-25 RX ADMIN — NEOSTIGMINE METHYLSULFATE 3 MG: 1 INJECTION INTRAVENOUS at 09:27

## 2017-09-25 RX ADMIN — PROPOFOL 200 MG: 10 INJECTION, EMULSION INTRAVENOUS at 08:47

## 2017-09-25 RX ADMIN — SODIUM CHLORIDE 3.25 MG: 9 INJECTION INTRAMUSCULAR; INTRAVENOUS; SUBCUTANEOUS at 10:57

## 2017-09-25 RX ADMIN — FENTANYL CITRATE 100 MCG: 50 INJECTION, SOLUTION INTRAMUSCULAR; INTRAVENOUS at 08:46

## 2017-09-25 RX ADMIN — MIDAZOLAM HYDROCHLORIDE 2 MG: 1 INJECTION, SOLUTION INTRAMUSCULAR; INTRAVENOUS at 08:33

## 2017-09-25 RX ADMIN — GLYCOPYRROLATE 0.4 MG: 0.2 INJECTION INTRAMUSCULAR; INTRAVENOUS at 09:27

## 2017-09-25 RX ADMIN — ONDANSETRON 4 MG: 2 INJECTION INTRAMUSCULAR; INTRAVENOUS at 09:16

## 2017-09-25 RX ADMIN — FAMOTIDINE 20 MG: 10 INJECTION, SOLUTION INTRAVENOUS at 08:33

## 2017-09-25 RX ADMIN — ROCURONIUM BROMIDE 30 MG: 10 INJECTION, SOLUTION INTRAVENOUS at 08:47

## 2017-09-25 RX ADMIN — DEXAMETHASONE SODIUM PHOSPHATE 4 MG: 4 INJECTION, SOLUTION INTRA-ARTICULAR; INTRALESIONAL; INTRAMUSCULAR; INTRAVENOUS; SOFT TISSUE at 09:15

## 2017-09-25 RX ADMIN — HYDROMORPHONE HYDROCHLORIDE 0.5 MG: 2 INJECTION, SOLUTION INTRAMUSCULAR; INTRAVENOUS; SUBCUTANEOUS at 10:04

## 2017-09-25 NOTE — ANESTHESIA PREPROCEDURE EVALUATION
Anesthetic History     Increased risk of difficult airway          Review of Systems / Medical History  Nursing notes reviewed and pertinent labs reviewed    Pulmonary  Within defined limits                 Neuro/Psych   Within defined limits           Cardiovascular    Hypertension: well controlled        Dysrhythmias (s/p ablation over 10 years ago) : SVT      Exercise tolerance: >4 METS     GI/Hepatic/Renal     GERD: well controlled    Renal disease: stones  Hiatal hernia (asymptomatic)     Endo/Other  Within defined limits           Other Findings              Physical Exam    Airway  Mallampati: II  TM Distance: 4 - 6 cm  Neck ROM: normal range of motion   Mouth opening: Normal     Cardiovascular    Rhythm: regular           Dental  No notable dental hx       Pulmonary  Breath sounds clear to auscultation               Abdominal         Other Findings            Anesthetic Plan    ASA: 2  Anesthesia type: general          Induction: Intravenous  Anesthetic plan and risks discussed with: Patient and Spouse      Several GAs with LMA in past.  After surgery 12/16, pt noted bruising and ulcers in back of throat. No mention of any problems. Surgeon requesting ETT and paralysis for procedure today.

## 2017-09-25 NOTE — H&P
History and Physical    Subjective:      Onofre Lynn is a 35 y.o. female presented to ER with fever and LEFT flank pain 9/13/17. She has a long standing history of nephrolithiasis and is followed in our office by Dr. Kiarra Koroma. CT urogram in ER revealed moderate left hydronephrosis and proximal hydroureter due to a 5 mm calculus within the proximal left ureter. Several additional bilateral renal calculi and findings suggesting medullary nephrocalcinosis. Images and report were both personally reviewed. Fever was present. She underwent urgent LEFT ureteral stent placement. Blood culture and urine culture returned enterococcus. ID was consulted. She is currently on IV PCN via PICC line.   She returns today for cystoscopy, LEFT ureteroscopy, laser lithotripsy, LEFT ureteral stent placement.         Past Medical History:   Diagnosis Date    Anxiety     managed with medication     Difficult intubation     12/21/16 sore throat and ulcers after intubation     Essential hypertension 11/04/2016    only when septic in 10/2016, takes atenolol for HR    GERD (gastroesophageal reflux disease)     OTC prn    Hiatal hernia     History of kidney stones     numerous    Kidney stone     left side    Nausea & vomiting     nausea after anesthesia    Paroxysmal SVT (supraventricular tachycardia) ([address]     managed with ablation and medication      Past Surgical History:   Procedure Laterality Date    CLOSE CYSTOSTOMY      for kidney stones    HX BLADDER SUSPENSION  04/2016    HX LITHOTRIPSY      HX SVT ABLATION  2003    HX TUBAL LIGATION  2006      Family History   Problem Relation Age of Onset    Heart Disease Mother      congenital heart block with lalo    Diabetes Father     Hypertension Father      Social History   Substance Use Topics    Smoking status: Never Smoker    Smokeless tobacco: Never Used    Alcohol use No     Allergies   Allergen Reactions    Ciprofloxacin Other (comments)     \"joint pain\"     Prior to Admission medications    Medication Sig Start Date End Date Taking? Authorizing Provider   OTHER IV continuous Penicillin via pump into PICC line   Yes Historical Provider   buPROPion SR (WELLBUTRIN SR) 150 mg SR tablet Take 150 mg by mouth every morning. Yes Historical Provider   potassium chloride SR (KLOR-CON 10) 10 mEq tablet Take 20 mEq by mouth every morning. Yes Historical Provider   atenolol (TENORMIN) 25 mg tablet Take 25 mg by mouth every morning. Yes Historical Provider        Review of Systems:  Pertinent items are noted in HPI. Objective:      Patient Vitals for the past 8 hrs:   BP Temp Pulse Resp SpO2 Height Weight   17 0714 122/55 98.5 °F (36.9 °C) 91 18 98 % 5' 3\" (1.6 m) 149 lb 4 oz (67.7 kg)       Temp (24hrs), Av.5 °F (36.9 °C), Min:98.5 °F (36.9 °C), Max:98.5 °F (36.9 °C)      Physical Exam:  GENERAL: alert, cooperative, no distress, appears stated age, LUNG: clear to auscultation bilaterally, HEART: regular rate and rhythm, ABDOMEN: soft, non-tender. Bowel sounds normal. No masses,  no organomegaly      Assessment:     LEFT ureteral stone, LEFT renal stone    Plan:     1. The risks, benefits, complications, treatment options, and expected outcomes were discussed with the patient. The patient understands the risks, any and all questions were answered to the patient's satisfaction. 2. Proceed with cystoscopy, LEFT ureteroscopy, laser lithotripsy, LEFT ureteral stent exchange.          Signed By: Bhakti Bhatti MD                         2017

## 2017-09-25 NOTE — DISCHARGE INSTRUCTIONS
Ureteral Stent Placement: What to Expect at [facility]  A ureteral (say \"you-REE-ter-ul\") stent is a thin, hollow tube that is placed in the ureter to help urine pass from the kidney into the bladder. Ureters are the tubes that connect the kidneys to the bladder. You may have a small amount of blood in your urine for 1 to 3 days after the procedure. While the stent is in place, you may have to urinate more often, feel a sudden need to urinate, or feel like you cannot completely empty your bladder. You may feel some pain when you urinate or do strenuous activity. You also may notice a small amount of blood in your urine after strenuous activities. These side effects usually do not prevent people from doing their normal daily activities. You may have a thin string coming out of your urethra. Your urethra is the tube that carries urine from your bladder to outside your body. This string is attached to the stent. Try not to pull on the string. The doctor will use the string to pull out the stent when you no longer need it. After the procedure, urine may flow better from your kidneys to your bladder. A ureteral stent may be left in place for several days or for as long as several months. Your doctor will take it out when you no longer need it. This care sheet gives you a general idea about how long it will take for you to recover. But each person recovers at a different pace. Follow the steps below to get better as quickly as possible. How can you care for yourself at home? Activity  · Rest when you feel tired. Getting enough sleep will help you recover. · Avoid strenuous activities, such as bicycle riding, jogging, weight lifting, or aerobic exercise, until your doctor says it is okay. · Ask your doctor when you can drive again. · Most people are able to return to work the day after the procedure. If your work requires intense activity, you may feel pain in your kidney area or get tired easily.  If this happens, you may need to do less strenuous activities while the stent is in. · Ask your doctor when it is okay for you to have sex. Diet  · You can eat your normal diet. If your stomach is upset, try bland, low-fat foods like plain rice, broiled chicken, toast, and yogurt. · Drink plenty of fluids (unless your doctor tells you not to). Medicines  · Your doctor will tell you if and when you can restart your medicines. He or she will also give you instructions about taking any new medicines. · If you take blood thinners, such as warfarin (Coumadin), clopidogrel (Plavix), or aspirin, be sure to talk to your doctor. He or she will tell you if and when to start taking those medicines again. Make sure that you understand exactly what your doctor wants you to do. · Be safe with medicines. Take pain medicines exactly as directed. ¨ If the doctor gave you a prescription medicine for pain, take it as prescribed. ¨ If you are not taking a prescription pain medicine, ask your doctor if you can take an over-the-counter medicine. · If you think your pain medicine is making you sick to your stomach:  ¨ Take your medicine after meals (unless your doctor has told you not to). ¨ Ask your doctor for a different pain medicine. · If your doctor prescribed antibiotics, take them as directed. Do not stop taking them just because you feel better. You need to take the full course of antibiotics. Follow-up care is a key part of your treatment and safety. Be sure to make and go to all appointments, and call your doctor if you are having problems. It's also a good idea to know your test results and keep a list of the medicines you take. When should you call for help? Call 911 anytime you think you may need emergency care. For example, call if:  · You passed out (lost consciousness). · You have severe trouble breathing. · You have sudden chest pain and shortness of breath, or you cough up blood.   · You have severe belly pain.  Call your doctor now or seek immediate medical care if:  · Part or all of the stent comes out of your urethra. · You have pain that does not get better after you take pain medicine. · You have symptoms of a urinary infection. For example:  ¨ You have blood or pus in your urine. ¨ You have pain in your back just below your rib cage. This is called flank pain. ¨ You have a fever, chills, or body aches. ¨ It hurts to urinate. ¨ You have groin or belly pain. · You cannot control when you urinate, or you leak urine. Watch closely for changes in your health, and be sure to contact your doctor if you have any problems. Where can you learn more? Go to http://juju-bertha.info/. Enter Z683 in the search box to learn more about \"Ureteral Stent Placement: What to Expect at Home. \"  Current as of: August 12, 2016  Content Version: 11.3  © 4370-6092 [employer]. Care instructions adapted under license by [employer] (which disclaims liability or warranty for this information). If you have questions about a medical condition or this instruction, always ask your healthcare professional. [address] any warranty or liability for your use of this information. After general anesthesia or intravenous sedation, for 24 hours or while taking prescription Narcotics:  · Limit your activities  · Do not drive and operate hazardous machinery  · Do not make important personal or business decisions  · Do  not drink alcoholic beverages  · If you have not urinated within 8 hours after discharge, please contact your surgeon on call. *  Please give a list of your current medications to your Primary Care Provider. *  Please update this list whenever your medications are discontinued, doses are      changed, or new medications (including over-the-counter products) are added.   *  Please carry medication information at all times in case of emergency situations. These are general instructions for a healthy lifestyle:  No smoking/ No tobacco products/ Avoid exposure to second hand smoke  Surgeon General's Warning:  Quitting smoking now greatly reduces serious risk to your health. Obesity, smoking, and sedentary lifestyle greatly increases your risk for illness  A healthy diet, regular physical exercise & weight monitoring are important for maintaining a healthy lifestyle    You may be retaining fluid if you have a history of heart failure or if you experience any of the following symptoms:  Weight gain of 3 pounds or more overnight or 5 pounds in a week, increased swelling in our hands or feet or shortness of breath while lying flat in bed. Please call your doctor as soon as you notice any of these symptoms; do not wait until your next office visit. Recognize signs and symptoms of STROKE:    F-face looks uneven    A-arms unable to move or move unevenly    S-speech slurred or non-existent    T-time-call 911 as soon as signs and symptoms begin-DO NOT go       Back to bed or wait to see if you get better-TIME IS BRAIN.

## 2017-09-25 NOTE — IP AVS SNAPSHOT
[address] [address] 
[phone] Patient: Judy William MRN: [id number] :1975 You are allergic to the following Allergen Reactions Ciprofloxacin Other (comments) \"joint pain\" Recent Documentation Height Weight BMI OB Status Smoking Status 1.6 m 67.7 kg 26.44 kg/m2 Having regular periods Never Smoker Emergency Contacts Name Discharge Info Relation Home Work Mobile Jason Fiore  Spouse [3] [phone] About your hospitalization You were admitted on:  2017 You last received care in the:  [facility] PACU You were discharged on:  2017 Unit phone number:  [phone] Why you were hospitalized Your primary diagnosis was:  Not on File Providers Seen During Your Hospitalizations Provider Role Specialty Primary office phone Robert Mancera MD Attending Provider Urology [phone] Your Primary Care Physician (PCP) Primary Care Physician Office Phone Office Fax [facility] [phone] Follow-up Information Follow up With Details Comments Contact Info Paulina Ramsay MD   [address] 
[phone] Robert Mancera MD  The office will call you with follow up information [address] [place] [address] 
[phone] Current Discharge Medication List  
  
CONTINUE these medications which have NOT CHANGED Dose & Instructions Dispensing Information Comments Morning Noon Evening Bedtime  
 atenolol 25 mg tablet Commonly known as:  TENORMIN Your last dose was: Your next dose is:    
   
   
 Dose:  25 mg Take 25 mg by mouth every morning. Refills:  0  
     
   
   
   
  
 buPROPion  mg SR tablet Commonly known as:  Stewart Ward Your last dose was: Your next dose is:    
   
   
 Dose:  150 mg Take 150 mg by mouth every morning. Refills:  0  
     
   
   
   
  
 OTHER Your last dose was: Your next dose is:    
   
   
 IV continuous Penicillin via pump into PICC line Refills:  0  
     
   
   
   
  
 potassium chloride SR 10 mEq tablet Commonly known as:  KLOR-CON 10 Your last dose was: Your next dose is:    
   
   
 Dose:  20 mEq Take 20 mEq by mouth every morning. Refills:  0 Discharge Instructions Ureteral Stent Placement: What to Expect at Home Your Recovery A ureteral (say \"you-REE-ter-ul\") stent is a thin, hollow tube that is placed in the ureter to help urine pass from the kidney into the bladder. Ureters are the tubes that connect the kidneys to the bladder. You may have a small amount of blood in your urine for 1 to 3 days after the procedure. While the stent is in place, you may have to urinate more often, feel a sudden need to urinate, or feel like you cannot completely empty your bladder. You may feel some pain when you urinate or do strenuous activity. You also may notice a small amount of blood in your urine after strenuous activities. These side effects usually do not prevent people from doing their normal daily activities. You may have a thin string coming out of your urethra. Your urethra is the tube that carries urine from your bladder to outside your body. This string is attached to the stent. Try not to pull on the string. The doctor will use the string to pull out the stent when you no longer need it. After the procedure, urine may flow better from your kidneys to your bladder. A ureteral stent may be left in place for several days or for as long as several months. Your doctor will take it out when you no longer need it.  
This care sheet gives you a general idea about how long it will take for you to recover. But each person recovers at a different pace. Follow the steps below to get better as quickly as possible. How can you care for yourself at home? Activity · Rest when you feel tired. Getting enough sleep will help you recover. · Avoid strenuous activities, such as bicycle riding, jogging, weight lifting, or aerobic exercise, until your doctor says it is okay. · Ask your doctor when you can drive again. · Most people are able to return to work the day after the procedure. If your work requires intense activity, you may feel pain in your kidney area or get tired easily. If this happens, you may need to do less strenuous activities while the stent is in. · Ask your doctor when it is okay for you to have sex. Diet · You can eat your normal diet. If your stomach is upset, try bland, low-fat foods like plain rice, broiled chicken, toast, and yogurt. · Drink plenty of fluids (unless your doctor tells you not to). Medicines · Your doctor will tell you if and when you can restart your medicines. He or she will also give you instructions about taking any new medicines. · If you take blood thinners, such as warfarin (Coumadin), clopidogrel (Plavix), or aspirin, be sure to talk to your doctor. He or she will tell you if and when to start taking those medicines again. Make sure that you understand exactly what your doctor wants you to do. · Be safe with medicines. Take pain medicines exactly as directed. ¨ If the doctor gave you a prescription medicine for pain, take it as prescribed. ¨ If you are not taking a prescription pain medicine, ask your doctor if you can take an over-the-counter medicine. · If you think your pain medicine is making you sick to your stomach: 
¨ Take your medicine after meals (unless your doctor has told you not to). ¨ Ask your doctor for a different pain medicine. · If your doctor prescribed antibiotics, take them as directed.  Do not stop taking them just because you feel better. You need to take the full course of antibiotics. Follow-up care is a key part of your treatment and safety. Be sure to make and go to all appointments, and call your doctor if you are having problems. It's also a good idea to know your test results and keep a list of the medicines you take. When should you call for help? Call 911 anytime you think you may need emergency care. For example, call if: 
· You passed out (lost consciousness). · You have severe trouble breathing. · You have sudden chest pain and shortness of breath, or you cough up blood. · You have severe belly pain. Call your doctor now or seek immediate medical care if: · Part or all of the stent comes out of your urethra. · You have pain that does not get better after you take pain medicine. · You have symptoms of a urinary infection. For example: ¨ You have blood or pus in your urine. ¨ You have pain in your back just below your rib cage. This is called flank pain. ¨ You have a fever, chills, or body aches. ¨ It hurts to urinate. ¨ You have groin or belly pain. · You cannot control when you urinate, or you leak urine. Watch closely for changes in your health, and be sure to contact your doctor if you have any problems. Where can you learn more? Go to http://juju-bertha.info/. Enter C536 in the search box to learn more about \"Ureteral Stent Placement: What to Expect at Home. \" Current as of: August 12, 2016 Content Version: 11.3 © 2242-3637 [employer]. Care instructions adapted under license by [employer] (which disclaims liability or warranty for this information). If you have questions about a medical condition or this instruction, always ask your healthcare professional. [address] any warranty or liability for your use of this information. After general anesthesia or intravenous sedation, for 24 hours or while taking prescription Narcotics: · Limit your activities · Do not drive and operate hazardous machinery · Do not make important personal or business decisions · Do  not drink alcoholic beverages · If you have not urinated within 8 hours after discharge, please contact your surgeon on call. *  Please give a list of your current medications to your Primary Care Provider. *  Please update this list whenever your medications are discontinued, doses are 
    changed, or new medications (including over-the-counter products) are added. *  Please carry medication information at all times in case of emergency situations. These are general instructions for a healthy lifestyle: No smoking/ No tobacco products/ Avoid exposure to second hand smoke Surgeon General's Warning:  Quitting smoking now greatly reduces serious risk to your health. Obesity, smoking, and sedentary lifestyle greatly increases your risk for illness A healthy diet, regular physical exercise & weight monitoring are important for maintaining a healthy lifestyle You may be retaining fluid if you have a history of heart failure or if you experience any of the following symptoms:  Weight gain of 3 pounds or more overnight or 5 pounds in a week, increased swelling in our hands or feet or shortness of breath while lying flat in bed. Please call your doctor as soon as you notice any of these symptoms; do not wait until your next office visit. Recognize signs and symptoms of STROKE: 
 
F-face looks uneven A-arms unable to move or move unevenly S-speech slurred or non-existent T-time-call 911 as soon as signs and symptoms begin-DO NOT go Back to bed or wait to see if you get better-TIME IS BRAIN. Discharge Orders None Introducing \[facility] [place] [facility]\"" & HEALTH SERVICES! Dear Zenaida Collado: 
Thank you for requesting a [employer] account.   Our records indicate that you have previously registered for a [employer] account but its currently inactive. Please call our [employer] support line at [phone]. Additional Information If you have questions, please visit the Frequently Asked Questions section of the [employer] website at [email or web address] Remember, MyChart is NOT to be used for urgent needs. For medical emergencies, dial 911. Now available from your iPhone and Android! General Information Please provide this summary of care documentation to your next provider. Patient Signature:  ____________________________________________________________ Date:  ____________________________________________________________  
  
Freeman Snipe Provider Signature:  ____________________________________________________________ Date:  ____________________________________________________________

## 2017-09-25 NOTE — PERIOP NOTES
Pt received from OR s/p cysto. Presents with IV infusion of PCN going via a personal pump. Pt is on this at home and came to the hospital with this already in place.

## 2017-09-25 NOTE — OP NOTES
[address] 63   OPERATIVE REPORT       Name:  Joan Taylor   MR#:  [id number]   :  1975   Account #:  [de-identified]   Date of Adm:  2017       DATE OF SURGERY: 2017    PREOPERATIVE DIAGNOSIS: Left proximal ureteral calculus and left   lower pole renal calculus. POSTOPERATIVE DIAGNOSIS: Left proximal ureteral calculus and   left lower pole renal calculus. PROCEDURE PERFORMED:   1. Cystourethroscopy. 2. Left ureteroscopy. 3. Laser lithotripsy. 4. Left ureteral stent exchange. SURGEON: Eladio Barbosa. Nayeli Mansfield MD     ANESTHESIA: General endotracheal.    ESTIMATED BLOOD LOSS: Minimal.    COMPLICATIONS: None apparent. INDICATIONS: This is a 25-year-old patient who has a long-  standing history of stones who was admitted to the hospital   2017 through 2017 with an obstructing left ureteral   stone and gram-positive bacteremia and sepsis. Left ureteral   stent was placed urgently during that admission and she   underwent IV antibiotics and Infectious Disease consult and has   been discharged with a PICC line in place on penicillin. She now   returns today for definitive management of her obstructing left   ureteral stone and also a left lower pole renal stone that is   present as well. TECHNIQUE: The patient was taken to the operating room and   placed in the supine position. Anesthesia was induced via   anesthesiology service. She was repositioned into the lithotomy   position and prepped and draped in sterile surgical fashion with   the genitalia in a sterile field. A 22-Yoruba rigid cystoscope   was introduced atraumatically via the urethra. Pancystoscopy and   urethroscopy revealed no pathology. I turned my attention to the   left ureteral orifice and grasped the stent exiting the ureteral   orifice and brought external to the urethral meatus. Through   this stent I placed a wire and then removed the stent.  The semi-  rigid ureteroscope was then used to advance via urethra into the   bladder and up the left ureter and unfortunately I was unable to   reach the level of the left proximal stone safely using the   semi-rigid scope. I then advanced a second wire through the semirigid scope and   removed the scope. Over this second wire, I advanced a flexible   ureteroscope to the level of the proximal left ureteral stone. I   then used a 200 micron holmium laser fiber at settings of 5 Hz   and 0.5 joules to fragment that stone into very small fragments,   all smaller than 0.5 mm in size. Once the left proximal stone   had been fragmented nicely. I continued to advance the flexible   ureteroscope into the left kidney and performed pan renoscopy   with visualization of the left renal collecting system. In the   lower pole, the 6 mm left renal stone was encountered and the   200 micron holmium laser fiber was used to fragment it into 4-5   fragments with the largest piece being left probably in the 3-4   mm size range. Due to the equipment available and the location   of the stone in the very lowest portion of the kidney, I could   not fragment the stone any smaller. I then used the flexible   ureteroscope to visualize the entire course of the left ureter   upon its removal with no stone fragments whatsoever within the   left ureter. The scope was removed. The cystoscope was backloaded over the safety wire to the level   of the left ureteral orifice and a 7-Serbian x 24 cm double-J   ureteral stent was deployed with good curl noted in the left   renal pelvis by fluoroscopy and in the bladder by cystoscopy. The bladder was emptied. The cystoscope was removed and a short   portion of string was left exiting the urethral meatus. PLAN: The patient will follow up in my office later this week   with a KUB and then very likely have her stent removed by   grasping the string exiting the urethral meatus.  I have also   spoken with her  about eventually coming back in a month   or so with a KUB to determine if left ESWL would be pertinent   for the lower pole calculus and also to perform an ultrasound   PVR to see if she is retaining urine in regards to her recurrent   urinary tract infections.         MD CHARLOTTE Navarro / KYREE   D:  09/25/2017   09:46   T:  09/25/2017   11:44   Job #:  [id number]

## 2017-09-25 NOTE — ANESTHESIA POSTPROCEDURE EVALUATION
Post-Anesthesia Evaluation and Assessment    Patient: Priscilla Tay MRN: [id number]  SSN: [id number]    YOB: 1975  Age: 35 y.o. Sex: female       Cardiovascular Function/Vital Signs  Visit Vitals    /63    Pulse 70    Temp 36.9 °C (98.5 °F)    Resp 17    Ht 5' 3\" (1.6 m)    Wt 67.7 kg (149 lb 4 oz)    SpO2 95%    BMI 26.44 kg/m2       Patient is status post general anesthesia for Procedure(s):  CYSTOSCOPY / LEFT URETEROSCOPY/ LASER LITHOTRIPSY / LEFT URETERAL STENT EXCHANGE. Nausea/Vomiting: Mild    Postoperative hydration reviewed and adequate. Pain:  Pain Scale 1: Numeric (0 - 10) (09/25/17 1038)  Pain Intensity 1: 0 (09/25/17 1038)   Managed    Neurological Status:   Neuro (WDL): Within Defined Limits (09/25/17 1038)  Neuro  Neurologic State: Sleeping (09/25/17 0949)  LUE Motor Response: Spontaneous  (09/25/17 0949)  LLE Motor Response: Spontaneous  (09/25/17 0949)  RUE Motor Response: Spontaneous  (09/25/17 0949)  RLE Motor Response: Spontaneous  (09/25/17 0949)   At baseline    Mental Status and Level of Consciousness: Arousable    Pulmonary Status:   O2 Device: Room air (09/25/17 1038)   Adequate oxygenation and airway patent    Complications related to anesthesia: None    Post-anesthesia assessment completed.  No concerns    Signed By: Sean Lim MD     September 25, 2017

## 2017-09-25 NOTE — BRIEF OP NOTE
BRIEF OPERATIVE NOTE    Date of Procedure: 9/25/2017   Preoperative Diagnosis: L ureteral and L renal stone  Postoperative Diagnosis:same  Procedure(s):  CYSTOSCOPY / LEFT URETEROSCOPY/ LASER LITHOTRIPSY / LEFT URETERAL STENT EXCHANGE  Surgeon(s) and Role:     * Ezekiel Reeves MD - Primary         Assistant Staff:       Surgical Staff:  Circ-1: Olivia Temple RN  Event Time In   Incision Start 0901   Incision Close 8696     Anesthesia: General   Estimated Blood Loss: minimal  Specimens: * No specimens in log *   Findings: see dictation   Complications: none apparent  Implants:   Implant Name Type Inv.  Item Serial No.  Lot No. LRB No. Used Action   [employer] FIRM [id number] -- [facility] - [id number]   [employer] FIRM [id number] -- [facility] 19 [id number] Left 1 Implanted

## 2017-09-27 ENCOUNTER — HOSPITAL ENCOUNTER (OUTPATIENT)
Dept: LAB | Age: 42
Discharge: HOME | End: 2017-09-27
Payer: SUBSIDIZED

## 2017-09-27 LAB
BASOPHILS # BLD: 0 K/UL (ref 0–0.2)
BASOPHILS NFR BLD: 1 % (ref 0–2)
CREAT SERPL-MCNC: 0.8 MG/DL (ref 0.6–1)
DIFFERENTIAL METHOD BLD: ABNORMAL
EOSINOPHIL # BLD: 0.2 K/UL (ref 0–0.8)
EOSINOPHIL NFR BLD: 2 % (ref 0.5–7.8)
ERYTHROCYTE [DISTWIDTH] IN BLOOD BY AUTOMATED COUNT: 12 % (ref 11.9–14.6)
HCT VFR BLD AUTO: 34.8 % (ref 35.8–46.3)
HGB BLD-MCNC: 11.7 G/DL (ref 11.7–15.4)
LYMPHOCYTES # BLD: 1.3 K/UL (ref 0.5–4.6)
LYMPHOCYTES NFR BLD: 15 % (ref 13–44)
MCH RBC QN AUTO: 31.1 PG (ref 26.1–32.9)
MCHC RBC AUTO-ENTMCNC: 33.6 G/DL (ref 31.4–35)
MCV RBC AUTO: 92.6 FL (ref 79.6–97.8)
MONOCYTES # BLD: 0.7 K/UL (ref 0.1–1.3)
MONOCYTES NFR BLD: 9 % (ref 4–12)
NEUTS SEG # BLD: 6.3 K/UL (ref 1.7–8.2)
NEUTS SEG NFR BLD: 73 % (ref 43–78)
PLATELET # BLD AUTO: 363 K/UL (ref 150–450)
PMV BLD AUTO: 10.2 FL (ref 10.8–14.1)
RBC # BLD AUTO: 3.76 M/UL (ref 4.05–5.25)
WBC # BLD AUTO: 8.5 K/UL (ref 4.3–11.1)

## 2017-09-27 PROCEDURE — 82565 ASSAY OF CREATININE: CPT | Performed by: NURSE PRACTITIONER

## 2017-09-27 PROCEDURE — 85025 COMPLETE CBC W/AUTO DIFF WBC: CPT | Performed by: NURSE PRACTITIONER

## 2017-09-29 ENCOUNTER — HOME HEALTH ADMISSION (OUTPATIENT)
Dept: HOME HEALTH SERVICES | Facility: HOME HEALTH | Age: 42
End: 2017-09-29

## 2017-10-20 ENCOUNTER — ANESTHESIA EVENT (OUTPATIENT)
Dept: SURGERY | Age: 42
End: 2017-10-20
Payer: SUBSIDIZED

## 2017-10-21 ENCOUNTER — ANESTHESIA (OUTPATIENT)
Dept: SURGERY | Age: 42
End: 2017-10-21
Payer: SUBSIDIZED

## 2017-10-21 ENCOUNTER — HOSPITAL ENCOUNTER (OUTPATIENT)
Age: 42
Setting detail: OUTPATIENT SURGERY
Discharge: HOME | End: 2017-10-21
Attending: UROLOGY | Admitting: UROLOGY
Payer: SUBSIDIZED

## 2017-10-21 VITALS
HEART RATE: 77 BPM | TEMPERATURE: 97.5 F | RESPIRATION RATE: 12 BRPM | OXYGEN SATURATION: 97 % | WEIGHT: 149.44 LBS | DIASTOLIC BLOOD PRESSURE: 68 MMHG | HEIGHT: 63 IN | BODY MASS INDEX: 26.48 KG/M2 | SYSTOLIC BLOOD PRESSURE: 126 MMHG

## 2017-10-21 PROCEDURE — 77030020782 HC GWN BAIR PAWS FLX 3M -B: Performed by: ANESTHESIOLOGY

## 2017-10-21 PROCEDURE — 76210000063 HC OR PH I REC FIRST 0.5 HR: Performed by: UROLOGY

## 2017-10-21 PROCEDURE — 77030032490 HC SLV COMPR SCD KNE COVD -B: Performed by: UROLOGY

## 2017-10-21 PROCEDURE — 77030019927 HC TBNG IRR CYSTO BAXT -A: Performed by: UROLOGY

## 2017-10-21 PROCEDURE — 76060000033 HC ANESTHESIA 1 TO 1.5 HR: Performed by: UROLOGY

## 2017-10-21 PROCEDURE — 77030033647: Performed by: UROLOGY

## 2017-10-21 PROCEDURE — 77030018832 HC SOL IRR H20 ICUM -A: Performed by: UROLOGY

## 2017-10-21 PROCEDURE — 74011000250 HC RX REV CODE- 250

## 2017-10-21 PROCEDURE — 76210000020 HC REC RM PH II FIRST 0.5 HR: Performed by: UROLOGY

## 2017-10-21 PROCEDURE — 74011250636 HC RX REV CODE- 250/636: Performed by: ANESTHESIOLOGY

## 2017-10-21 PROCEDURE — 76010000161 HC OR TIME 1 TO 1.5 HR INTENSV-TIER 1: Performed by: UROLOGY

## 2017-10-21 PROCEDURE — 77030020143 HC AIRWY LARYN INTUB CGAS -A: Performed by: ANESTHESIOLOGY

## 2017-10-21 PROCEDURE — C2617 STENT, NON-COR, TEM W/O DEL: HCPCS | Performed by: UROLOGY

## 2017-10-21 PROCEDURE — C1769 GUIDE WIRE: HCPCS | Performed by: UROLOGY

## 2017-10-21 PROCEDURE — 74011250636 HC RX REV CODE- 250/636

## 2017-10-21 PROCEDURE — 74011250637 HC RX REV CODE- 250/637: Performed by: ANESTHESIOLOGY

## 2017-10-21 PROCEDURE — 74011250636 HC RX REV CODE- 250/636: Performed by: UROLOGY

## 2017-10-21 DEVICE — URETERAL STENT
Type: IMPLANTABLE DEVICE | Site: URETER | Status: FUNCTIONAL
Brand: PERCUFLEX™ PLUS

## 2017-10-21 RX ORDER — HYDROMORPHONE HYDROCHLORIDE 2 MG/ML
0.5 INJECTION, SOLUTION INTRAMUSCULAR; INTRAVENOUS; SUBCUTANEOUS
Status: DISCONTINUED | OUTPATIENT
Start: 2017-10-21 | End: 2017-10-21 | Stop reason: HOSPADM

## 2017-10-21 RX ORDER — SODIUM CHLORIDE 0.9 % (FLUSH) 0.9 %
5-10 SYRINGE (ML) INJECTION EVERY 8 HOURS
Status: DISCONTINUED | OUTPATIENT
Start: 2017-10-21 | End: 2017-10-21 | Stop reason: HOSPADM

## 2017-10-21 RX ORDER — SODIUM CHLORIDE, SODIUM LACTATE, POTASSIUM CHLORIDE, CALCIUM CHLORIDE 600; 310; 30; 20 MG/100ML; MG/100ML; MG/100ML; MG/100ML
100 INJECTION, SOLUTION INTRAVENOUS CONTINUOUS
Status: DISCONTINUED | OUTPATIENT
Start: 2017-10-21 | End: 2017-10-21 | Stop reason: HOSPADM

## 2017-10-21 RX ORDER — FAMOTIDINE 20 MG/1
20 TABLET, FILM COATED ORAL ONCE
Status: COMPLETED | OUTPATIENT
Start: 2017-10-21 | End: 2017-10-21

## 2017-10-21 RX ORDER — SODIUM CHLORIDE 0.9 % (FLUSH) 0.9 %
5-10 SYRINGE (ML) INJECTION AS NEEDED
Status: DISCONTINUED | OUTPATIENT
Start: 2017-10-21 | End: 2017-10-21 | Stop reason: HOSPADM

## 2017-10-21 RX ORDER — MIDAZOLAM HYDROCHLORIDE 1 MG/ML
2 INJECTION, SOLUTION INTRAMUSCULAR; INTRAVENOUS ONCE
Status: COMPLETED | OUTPATIENT
Start: 2017-10-21 | End: 2017-10-21

## 2017-10-21 RX ORDER — HYDROCODONE BITARTRATE AND ACETAMINOPHEN 7.5; 325 MG/1; MG/1
1 TABLET ORAL
Qty: 20 TAB | Refills: 0 | Status: SHIPPED | OUTPATIENT
Start: 2017-10-21 | End: 2019-01-23 | Stop reason: SDUPTHER

## 2017-10-21 RX ORDER — OXYCODONE HYDROCHLORIDE 5 MG/1
5 TABLET ORAL
Status: DISCONTINUED | OUTPATIENT
Start: 2017-10-21 | End: 2017-10-21 | Stop reason: HOSPADM

## 2017-10-21 RX ORDER — MIDAZOLAM HYDROCHLORIDE 1 MG/ML
2 INJECTION, SOLUTION INTRAMUSCULAR; INTRAVENOUS
Status: DISCONTINUED | OUTPATIENT
Start: 2017-10-21 | End: 2017-10-21 | Stop reason: HOSPADM

## 2017-10-21 RX ORDER — ONDANSETRON 2 MG/ML
INJECTION INTRAMUSCULAR; INTRAVENOUS AS NEEDED
Status: DISCONTINUED | OUTPATIENT
Start: 2017-10-21 | End: 2017-10-21 | Stop reason: HOSPADM

## 2017-10-21 RX ORDER — PROPOFOL 10 MG/ML
INJECTION, EMULSION INTRAVENOUS AS NEEDED
Status: DISCONTINUED | OUTPATIENT
Start: 2017-10-21 | End: 2017-10-21 | Stop reason: HOSPADM

## 2017-10-21 RX ORDER — FENTANYL CITRATE 50 UG/ML
25 INJECTION, SOLUTION INTRAMUSCULAR; INTRAVENOUS ONCE
Status: DISCONTINUED | OUTPATIENT
Start: 2017-10-21 | End: 2017-10-21 | Stop reason: HOSPADM

## 2017-10-21 RX ORDER — DIPHENHYDRAMINE HYDROCHLORIDE 50 MG/ML
12.5 INJECTION, SOLUTION INTRAMUSCULAR; INTRAVENOUS ONCE
Status: DISCONTINUED | OUTPATIENT
Start: 2017-10-21 | End: 2017-10-21 | Stop reason: HOSPADM

## 2017-10-21 RX ORDER — DEXAMETHASONE SODIUM PHOSPHATE 4 MG/ML
INJECTION, SOLUTION INTRA-ARTICULAR; INTRALESIONAL; INTRAMUSCULAR; INTRAVENOUS; SOFT TISSUE AS NEEDED
Status: DISCONTINUED | OUTPATIENT
Start: 2017-10-21 | End: 2017-10-21 | Stop reason: HOSPADM

## 2017-10-21 RX ORDER — PHENAZOPYRIDINE HYDROCHLORIDE 200 MG/1
200 TABLET, FILM COATED ORAL
Qty: 12 TAB | Refills: 2 | Status: SHIPPED | OUTPATIENT
Start: 2017-10-21 | End: 2017-10-24

## 2017-10-21 RX ORDER — LIDOCAINE HYDROCHLORIDE 20 MG/ML
INJECTION, SOLUTION EPIDURAL; INFILTRATION; INTRACAUDAL; PERINEURAL AS NEEDED
Status: DISCONTINUED | OUTPATIENT
Start: 2017-10-21 | End: 2017-10-21 | Stop reason: HOSPADM

## 2017-10-21 RX ORDER — OXYCODONE AND ACETAMINOPHEN 5; 325 MG/1; MG/1
1 TABLET ORAL AS NEEDED
Status: DISCONTINUED | OUTPATIENT
Start: 2017-10-21 | End: 2017-10-21 | Stop reason: HOSPADM

## 2017-10-21 RX ORDER — SODIUM CHLORIDE 9 MG/ML
50 INJECTION, SOLUTION INTRAVENOUS CONTINUOUS
Status: DISCONTINUED | OUTPATIENT
Start: 2017-10-21 | End: 2017-10-21 | Stop reason: HOSPADM

## 2017-10-21 RX ORDER — CEFAZOLIN SODIUM IN 0.9 % NACL 2 G/50 ML
2 INTRAVENOUS SOLUTION, PIGGYBACK (ML) INTRAVENOUS ONCE
Status: COMPLETED | OUTPATIENT
Start: 2017-10-21 | End: 2017-10-21

## 2017-10-21 RX ORDER — FENTANYL CITRATE 50 UG/ML
INJECTION, SOLUTION INTRAMUSCULAR; INTRAVENOUS AS NEEDED
Status: DISCONTINUED | OUTPATIENT
Start: 2017-10-21 | End: 2017-10-21 | Stop reason: HOSPADM

## 2017-10-21 RX ORDER — LIDOCAINE HYDROCHLORIDE 10 MG/ML
0.1 INJECTION INFILTRATION; PERINEURAL AS NEEDED
Status: DISCONTINUED | OUTPATIENT
Start: 2017-10-21 | End: 2017-10-21 | Stop reason: HOSPADM

## 2017-10-21 RX ADMIN — PROPOFOL 200 MG: 10 INJECTION, EMULSION INTRAVENOUS at 08:35

## 2017-10-21 RX ADMIN — SODIUM CHLORIDE, SODIUM LACTATE, POTASSIUM CHLORIDE, AND CALCIUM CHLORIDE 1000 ML: 600; 310; 30; 20 INJECTION, SOLUTION INTRAVENOUS at 06:30

## 2017-10-21 RX ADMIN — FAMOTIDINE 20 MG: 20 TABLET, FILM COATED ORAL at 06:30

## 2017-10-21 RX ADMIN — DEXAMETHASONE SODIUM PHOSPHATE 4 MG: 4 INJECTION, SOLUTION INTRA-ARTICULAR; INTRALESIONAL; INTRAMUSCULAR; INTRAVENOUS; SOFT TISSUE at 09:00

## 2017-10-21 RX ADMIN — FENTANYL CITRATE 50 MCG: 50 INJECTION, SOLUTION INTRAMUSCULAR; INTRAVENOUS at 09:08

## 2017-10-21 RX ADMIN — ONDANSETRON 4 MG: 2 INJECTION INTRAMUSCULAR; INTRAVENOUS at 09:00

## 2017-10-21 RX ADMIN — FENTANYL CITRATE 50 MCG: 50 INJECTION, SOLUTION INTRAMUSCULAR; INTRAVENOUS at 08:19

## 2017-10-21 RX ADMIN — CEFAZOLIN 2 G: 1 INJECTION, POWDER, FOR SOLUTION INTRAMUSCULAR; INTRAVENOUS; PARENTERAL at 08:18

## 2017-10-21 RX ADMIN — LIDOCAINE HYDROCHLORIDE 100 MG: 20 INJECTION, SOLUTION EPIDURAL; INFILTRATION; INTRACAUDAL; PERINEURAL at 08:35

## 2017-10-21 RX ADMIN — SODIUM CHLORIDE, SODIUM LACTATE, POTASSIUM CHLORIDE, AND CALCIUM CHLORIDE: 600; 310; 30; 20 INJECTION, SOLUTION INTRAVENOUS at 08:15

## 2017-10-21 RX ADMIN — MIDAZOLAM HYDROCHLORIDE 2 MG: 1 INJECTION, SOLUTION INTRAMUSCULAR; INTRAVENOUS at 07:11

## 2017-10-21 NOTE — IP AVS SNAPSHOT
[address] [address] 
[phone] Patient: Cynthia Sanchez MRN: [id number] :1975 You are allergic to the following Allergen Reactions Ciprofloxacin Other (comments) \"joint pain\" Recent Documentation Height Weight BMI OB Status Smoking Status 1.6 m 67.8 kg 26.47 kg/m2 Having regular periods Never Smoker Emergency Contacts Name Discharge Info Relation Home Work Mobile Jason Fiore  Spouse [3] [phone] About your hospitalization You were admitted on:  2017 You last received care in the:  [facility] PACU You were discharged on:  2017 Unit phone number:  [phone] Why you were hospitalized Your primary diagnosis was:  Not on File Providers Seen During Your Hospitalizations Provider Role Specialty Primary office phone Alejandra Muhammad MD Attending Provider Urology [phone] Your Primary Care Physician (PCP) Primary Care Physician Office Phone Office Fax Matty Braxton [phone] Follow-up Information Follow up With Details Comments Contact Info Hetal Pitts MD   [address] 
[phone] Current Discharge Medication List  
  
START taking these medications Dose & Instructions Dispensing Information Comments Morning Noon Evening Bedtime HYDROcodone-acetaminophen 7.5-325 mg per tablet Commonly known as:  Elas Thelma Your last dose was: Your next dose is:    
   
   
 Dose:  1 Tab Take 1 Tab by mouth every four (4) hours as needed for Pain. Max Daily Amount: 6 Tabs. Quantity:  20 Tab Refills:  0 phenazopyridine 200 mg tablet Commonly known as:  PYRIDIUM Your last dose was:     
   
Your next dose is:    
   
   
 Dose:  200 mg  
 Take 1 Tab by mouth three (3) times daily as needed for Pain (use for burning on urination. Will turn urine orange.) for up to 3 days. Quantity:  12 Tab Refills:  2 CONTINUE these medications which have NOT CHANGED Dose & Instructions Dispensing Information Comments Morning Noon Evening Bedtime  
 atenolol 25 mg tablet Commonly known as:  TENORMIN Your last dose was: Your next dose is:    
   
   
 Dose:  25 mg Take 25 mg by mouth every morning. Refills:  0  
     
   
   
   
  
 buPROPion  mg SR tablet Commonly known as:  Winter Civil Your last dose was: Your next dose is:    
   
   
 Dose:  150 mg Take 150 mg by mouth every morning. Refills:  0  
     
   
   
   
  
 potassium chloride SR 10 mEq tablet Commonly known as:  KLOR-CON 10 Your last dose was: Your next dose is:    
   
   
 Dose:  20 mEq Take 20 mEq by mouth every morning. Refills:  0 Where to Get Your Medications Information on where to get these meds will be given to you by the nurse or doctor. ! Ask your nurse or doctor about these medications HYDROcodone-acetaminophen 7.5-325 mg per tablet  
 phenazopyridine 200 mg tablet Discharge Instructions Laser Lithotripsy: What to Expect at [facility] Your Recovery Laser lithotripsy is a way to treat kidney stones. This treatment uses a laser to break kidney stones into tiny pieces. For several hours after the procedure you may have a burning feeling when you urinate. You may feel the urge to go even if you don't need to. This feeling should go away within a day. Drinking a lot of water can help. Your doctor also may advise you to take medicine that numbs the burning. This medicine is called phenazopyridine. It is available by prescription and over the counter. Brand names include Pyridium and Uristat. Your doctor may prescribe an antibiotic. This will help prevent an infection. You may have some blood in your urine for 2 or 3 days. Your doctor may have placed a small tube inside one of your ureters. Ureters are the tubes that connect the kidneys to the bladder. The small tube the doctor may have placed is called a stent. It may help the stone fragments pass through your body. You may pull the string to remove the stent on Monday. This care sheet gives you a general idea about how long it will take for you to recover. But each person recovers at a different pace. Follow the steps below to feel better as quickly as possible. How can you care for yourself at home? Activity · Rest as much as you need to after you go home. · You may do your regular activities. But avoid hard exercise or sports for about a week or until there is no blood in your urine. Diet · You can eat your normal diet after lithotripsy. · Continue to drink plenty of fluids, enough so that your urine is light yellow or clear like water. If you have kidney, heart, or liver disease and have to limit fluids, talk with your doctor before you increase the amount of fluids you drink. Medicines · Your doctor will tell you if and when you can restart your medicines. He or she will also give you instructions about taking any new medicines. · If you take blood thinners, such as warfarin (Coumadin), clopidogrel (Plavix), or aspirin, be sure to talk to your doctor. He or she will tell you if and when to start taking those medicines again. Make sure that you understand exactly what your doctor wants you to do. · If you take medicine to stop the burning when you urinate, take it exactly as recommended. Call your doctor if you think you are having a problem with your medicine. This medicine may color your urine orange or red. This is normal. You will get more details on the specific medicine your doctor recommends. · If your doctor prescribed antibiotics, take them as directed. Do not stop taking them just because you feel better. You need to take the full course of antibiotics. · Be safe with medicines. Read and follow all instructions on the label. ¨ If the doctor gave you a prescription medicine for pain, take it as prescribed. ¨ If you are not taking a prescription pain medicine, ask your doctor if you can take acetaminophen (Tylenol). Do not take ibuprofen (Advil, Motrin) or naproxen (Aleve) or similar medicines unless your doctor tells you to. ¨ Do not take two or more pain medicines at the same time unless the doctor told you to. Many pain medicines have acetaminophen, which is Tylenol. Too much acetaminophen (Tylenol) can be harmful. Heat · Take a warm bath. This may soothe the burning. Other instructions · Urinate through the strainer the doctor gives you. Save any stone pieces, including those that look like sand or gravel. Take these to your doctor. This will help your doctor find the cause of your stones. Follow-up care is a key part of your treatment and safety. Be sure to make and go to all appointments, and call your doctor if you are having problems. It's also a good idea to know your test results and keep a list of the medicines you take. When should you call for help? Call your doctor now or seek immediate medical care if: 
· You have severe pain that does not get better after you take pain medicine. · You have severe pain when you urinate. · You have a fever over 100°F. 
· You are not able to urinate within 6 to 8 hours after the procedure. · Your urine is still bright red 48 hours after the procedure. Watch closely for any changes in your health, and be sure to contact your doctor if: 
· You have pain or burning when you urinate. A burning sensation is normal for a day or two after the test, but call if it does not get better. · You have a frequent urge to urinate but can pass only small amounts of urine. · Your urine is pink or cloudy or smells bad. It is normal for the urine to have a pinkish color for 2 or 3 days after the test, but call if it does not get better. · You do not get better as expected. Where can you learn more? Go to http://juju-bertha.info/. Enter Q239 in the search box to learn more about \"Laser Lithotripsy: What to Expect at Home. \" Current as of: April 3, 2017 Content Version: 11.3 © 0435-0481 [employer]. Care instructions adapted under license by [employer] (which disclaims liability or warranty for this information). If you have questions about a medical condition or this instruction, always ask your healthcare professional. [address] any warranty or liability for your use of this information. After general anesthesia or intravenous sedation, for 24 hours or while taking prescription Narcotics: · Limit your activities · Do not drive and operate hazardous machinery · Do not make important personal or business decisions · Do  not drink alcoholic beverages · If you have not urinated within 8 hours after discharge, please contact your surgeon on call. *  Please give a list of your current medications to your Primary Care Provider. *  Please update this list whenever your medications are discontinued, doses are 
    changed, or new medications (including over-the-counter products) are added. *  Please carry medication information at all times in case of emergency situations. These are general instructions for a healthy lifestyle: No smoking/ No tobacco products/ Avoid exposure to second hand smoke Surgeon General's Warning:  Quitting smoking now greatly reduces serious risk to your health. Obesity, smoking, and sedentary lifestyle greatly increases your risk for illness A healthy diet, regular physical exercise & weight monitoring are important for maintaining a healthy lifestyle You may be retaining fluid if you have a history of heart failure or if you experience any of the following symptoms:  Weight gain of 3 pounds or more overnight or 5 pounds in a week, increased swelling in our hands or feet or shortness of breath while lying flat in bed. Please call your doctor as soon as you notice any of these symptoms; do not wait until your next office visit. Recognize signs and symptoms of STROKE: 
 
F-face looks uneven A-arms unable to move or move unevenly S-speech slurred or non-existent T-time-call 911 as soon as signs and symptoms begin-DO NOT go Back to bed or wait to see if you get better-TIME IS BRAIN. Discharge Orders None [facility]! Dear Rob Sheriff: 
Thank you for requesting a [email or web address] account. Our records indicate that you already have an active [email or web address] account. You can access your account anytime at [email or web address] Did you know that you can access your hospital and ER discharge instructions at any time in [email or web address]? You can also review all of your test results from your hospital stay or ER visit. Additional Information If you have questions, please visit the Frequently Asked Questions section of the [email or web address] website at [email or web address] Remember, [email or web address] is NOT to be used for urgent needs. For medical emergencies, dial 911. Now available from your iPhone and Android! General Information Please provide this summary of care documentation to your next provider. Patient Signature:  ____________________________________________________________ Date:  ____________________________________________________________  
  
Bibiana Sleight Provider Signature:  ____________________________________________________________ Date:  ____________________________________________________________

## 2017-10-21 NOTE — IP AVS SNAPSHOT
Mack Dixon 
 
 
 [address] [facility] [address] 
[phone] Patient: Kenna Gosselin MRN: [id number] :1975 Current Discharge Medication List  
  
START taking these medications Dose & Instructions Dispensing Information Comments Morning Noon Evening Bedtime HYDROcodone-acetaminophen 7.5-325 mg per tablet Commonly known as:  Annamary Leonard Your last dose was: Your next dose is:    
   
   
 Dose:  1 Tab Take 1 Tab by mouth every four (4) hours as needed for Pain. Max Daily Amount: 6 Tabs. Quantity:  20 Tab Refills:  0 phenazopyridine 200 mg tablet Commonly known as:  PYRIDIUM Your last dose was: Your next dose is:    
   
   
 Dose:  200 mg Take 1 Tab by mouth three (3) times daily as needed for Pain (use for burning on urination. Will turn urine orange.) for up to 3 days. Quantity:  12 Tab Refills:  2 CONTINUE these medications which have NOT CHANGED Dose & Instructions Dispensing Information Comments Morning Noon Evening Bedtime  
 atenolol 25 mg tablet Commonly known as:  TENORMIN Your last dose was: Your next dose is:    
   
   
 Dose:  25 mg Take 25 mg by mouth every morning. Refills:  0  
     
   
   
   
  
 buPROPion  mg SR tablet Commonly known as:  Liane Chiquito Your last dose was: Your next dose is:    
   
   
 Dose:  150 mg Take 150 mg by mouth every morning. Refills:  0  
     
   
   
   
  
 potassium chloride SR 10 mEq tablet Commonly known as:  KLOR-CON 10 Your last dose was: Your next dose is:    
   
   
 Dose:  20 mEq Take 20 mEq by mouth every morning. Refills:  0 Where to Get Your Medications Information on where to get these meds will be given to you by the nurse or doctor. ! Ask your nurse or doctor about these medications HYDROcodone-acetaminophen 7.5-325 mg per tablet  
 phenazopyridine 200 mg tablet

## 2017-10-21 NOTE — DISCHARGE INSTRUCTIONS
Laser Lithotripsy: What to Expect at [address] lithotripsy is a way to treat kidney stones. This treatment uses a laser to break kidney stones into tiny pieces. For several hours after the procedure you may have a burning feeling when you urinate. You may feel the urge to go even if you don't need to. This feeling should go away within a day. Drinking a lot of water can help. Your doctor also may advise you to take medicine that numbs the burning. This medicine is called phenazopyridine. It is available by prescription and over the counter. Brand names include Pyridium and Uristat. Your doctor may prescribe an antibiotic. This will help prevent an infection. You may have some blood in your urine for 2 or 3 days. Your doctor may have placed a small tube inside one of your ureters. Ureters are the tubes that connect the kidneys to the bladder. The small tube the doctor may have placed is called a stent. It may help the stone fragments pass through your body. You may pull the string to remove the stent on Monday. This care sheet gives you a general idea about how long it will take for you to recover. But each person recovers at a different pace. Follow the steps below to feel better as quickly as possible. How can you care for yourself at home? Activity  · Rest as much as you need to after you go home. · You may do your regular activities. But avoid hard exercise or sports for about a week or until there is no blood in your urine. Diet  · You can eat your normal diet after lithotripsy. · Continue to drink plenty of fluids, enough so that your urine is light yellow or clear like water. If you have kidney, heart, or liver disease and have to limit fluids, talk with your doctor before you increase the amount of fluids you drink. Medicines  · Your doctor will tell you if and when you can restart your medicines.  He or she will also give you instructions about taking any new medicines. · If you take blood thinners, such as warfarin (Coumadin), clopidogrel (Plavix), or aspirin, be sure to talk to your doctor. He or she will tell you if and when to start taking those medicines again. Make sure that you understand exactly what your doctor wants you to do. · If you take medicine to stop the burning when you urinate, take it exactly as recommended. Call your doctor if you think you are having a problem with your medicine. This medicine may color your urine orange or red. This is normal. You will get more details on the specific medicine your doctor recommends. · If your doctor prescribed antibiotics, take them as directed. Do not stop taking them just because you feel better. You need to take the full course of antibiotics. · Be safe with medicines. Read and follow all instructions on the label. ¨ If the doctor gave you a prescription medicine for pain, take it as prescribed. ¨ If you are not taking a prescription pain medicine, ask your doctor if you can take acetaminophen (Tylenol). Do not take ibuprofen (Advil, Motrin) or naproxen (Aleve) or similar medicines unless your doctor tells you to. ¨ Do not take two or more pain medicines at the same time unless the doctor told you to. Many pain medicines have acetaminophen, which is Tylenol. Too much acetaminophen (Tylenol) can be harmful. Heat  · Take a warm bath. This may soothe the burning. Other instructions  · Urinate through the strainer the doctor gives you. Save any stone pieces, including those that look like sand or gravel. Take these to your doctor. This will help your doctor find the cause of your stones. Follow-up care is a key part of your treatment and safety. Be sure to make and go to all appointments, and call your doctor if you are having problems. It's also a good idea to know your test results and keep a list of the medicines you take. When should you call for help?   Call your doctor now or seek immediate medical care if:  · You have severe pain that does not get better after you take pain medicine. · You have severe pain when you urinate. · You have a fever over 100°F.  · You are not able to urinate within 6 to 8 hours after the procedure. · Your urine is still bright red 48 hours after the procedure. Watch closely for any changes in your health, and be sure to contact your doctor if:  · You have pain or burning when you urinate. A burning sensation is normal for a day or two after the test, but call if it does not get better. · You have a frequent urge to urinate but can pass only small amounts of urine. · Your urine is pink or cloudy or smells bad. It is normal for the urine to have a pinkish color for 2 or 3 days after the test, but call if it does not get better. · You do not get better as expected. Where can you learn more? Go to http://juju-bertha.info/. Enter Q239 in the search box to learn more about \"Laser Lithotripsy: What to Expect at Home. \"  Current as of: April 3, 2017  Content Version: 11.3  © 8895-7330 [employer]. Care instructions adapted under license by [employer] (which disclaims liability or warranty for this information). If you have questions about a medical condition or this instruction, always ask your healthcare professional. [address] any warranty or liability for your use of this information. After general anesthesia or intravenous sedation, for 24 hours or while taking prescription Narcotics:  · Limit your activities  · Do not drive and operate hazardous machinery  · Do not make important personal or business decisions  · Do  not drink alcoholic beverages  · If you have not urinated within 8 hours after discharge, please contact your surgeon on call. *  Please give a list of your current medications to your Primary Care Provider.   *  Please update this list whenever your medications are discontinued, doses are      changed, or new medications (including over-the-counter products) are added. *  Please carry medication information at all times in case of emergency situations. These are general instructions for a healthy lifestyle:  No smoking/ No tobacco products/ Avoid exposure to second hand smoke  Surgeon General's Warning:  Quitting smoking now greatly reduces serious risk to your health. Obesity, smoking, and sedentary lifestyle greatly increases your risk for illness  A healthy diet, regular physical exercise & weight monitoring are important for maintaining a healthy lifestyle    You may be retaining fluid if you have a history of heart failure or if you experience any of the following symptoms:  Weight gain of 3 pounds or more overnight or 5 pounds in a week, increased swelling in our hands or feet or shortness of breath while lying flat in bed. Please call your doctor as soon as you notice any of these symptoms; do not wait until your next office visit. Recognize signs and symptoms of STROKE:    F-face looks uneven    A-arms unable to move or move unevenly    S-speech slurred or non-existent    T-time-call 911 as soon as signs and symptoms begin-DO NOT go       Back to bed or wait to see if you get better-TIME IS BRAIN.

## 2017-10-21 NOTE — ANESTHESIA PREPROCEDURE EVALUATION
Anesthetic History     PONV   Pertinent negatives: Increased risk of difficult airway: no difficult intubation, just sore throat after intubation x 1. Review of Systems / Medical History  Patient summary reviewed and pertinent labs reviewed    Pulmonary  Within defined limits                 Neuro/Psych   Within defined limits           Cardiovascular    Hypertension: well controlled        Dysrhythmias       Exercise tolerance: >4 METS  Comments: Hx PSVT--s/p ablation; on atenolol   GI/Hepatic/Renal     GERD: well controlled    Renal disease: stones  Hiatal hernia     Endo/Other  Within defined limits           Other Findings   Comments: ECHO normal         Physical Exam    Airway  Mallampati: II  TM Distance: 4 - 6 cm  Neck ROM: normal range of motion   Mouth opening: Normal     Cardiovascular  Regular rate and rhythm,  S1 and S2 normal,  no murmur, click, rub, or gallop  Rhythm: regular  Rate: normal         Dental  No notable dental hx       Pulmonary  Breath sounds clear to auscultation               Abdominal  GI exam deferred       Other Findings            Anesthetic Plan    ASA: 2  Anesthesia type: general          Induction: Intravenous  Anesthetic plan and risks discussed with: Patient      LMA. Use soft suction catheter.

## 2017-10-21 NOTE — PERIOP NOTES
PACU DISCHARGE NOTE    Vital signs stable, pain well controlled, alert and oriented times three or at baseline, follow up per surgeon, no anesthetic complications. Discharge instructions given to pt and her . Both voice understanding of instructions. Pt is tolerating PO and has her IV removed. Pt to discharge door via wheelchair and left in care of her .

## 2017-10-21 NOTE — BRIEF OP NOTE
BRIEF OPERATIVE NOTE    Date of Procedure: 10/21/2017   Preoperative Diagnosis: Calculus of ureter [N20.1]  Postoperative Diagnosis: Calculus of ureter [N20.1]    Procedure(s):  CYSTOSCOPY LEFT  URETEROSCOPY/ LASER / LITHO / STENT PLACEMENT  Surgeon(s) and Role: Elva Murrieta MD - Primary         Assistant Staff:       Surgical Staff:  Circ-1: Yamilex Rincon RN  Event Time In   Incision Start 1228   Incision Close 0913     Anesthesia: General   Estimated Blood Loss: Minimal  Specimens: None   Findings: See dictated note   Complications:None  Implants:   Implant Name Type Inv.  Item Serial No.  Lot No. LRB No. Used Action   Conda Cart FIRM [id number] -- [facility] - [id number]   Conda Cart FIRM [id number] -- [facility] 19 [id number] 1 Implanted

## 2017-10-21 NOTE — ANESTHESIA POSTPROCEDURE EVALUATION
Post-Anesthesia Evaluation and Assessment    Patient: Niki Gil MRN: [id number]  SSN: [id number]    YOB: 1975  Age: 35 y.o. Sex: female       Cardiovascular Function/Vital Signs  Visit Vitals    /68    Pulse 77    Temp 36.4 °C (97.5 °F)    Resp 12    Ht 5' 3\" (1.6 m)    Wt 67.8 kg (149 lb 7 oz)    SpO2 97%    BMI 26.47 kg/m2       Patient is status post general anesthesia for Procedure(s):  CYSTOSCOPY LEFT  URETEROSCOPY/ Sherrie Fake / Sheral Roughen / STENT PLACEMENT. Nausea/Vomiting: None    Postoperative hydration reviewed and adequate. Pain:  Pain Scale 1: Numeric (0 - 10) (10/21/17 0954)  Pain Intensity 1: 0 (10/21/17 0954)   Managed    Neurological Status:   Neuro (WDL): Within Defined Limits (10/21/17 0954)  Neuro  Neurologic State: Drowsy (10/21/17 0932)  Orientation Level: Oriented X4 (10/21/17 0932)  LUE Motor Response: Purposeful (10/21/17 0932)  LLE Motor Response: Purposeful (10/21/17 0932)  RUE Motor Response: Purposeful (10/21/17 0932)  RLE Motor Response: Purposeful (10/21/17 0932)   At baseline    Mental Status and Level of Consciousness: Arousable    Pulmonary Status:   O2 Device: Room air (10/21/17 0954)   Adequate oxygenation and airway patent    Complications related to anesthesia: None    Post-anesthesia assessment completed.  No concerns    Signed By: Curt Betancourt MD     October 21, 2017

## 2017-10-23 NOTE — OP NOTES
[address]   OPERATIVE REPORT       Name:  Mitch Villegas   MR#:  [id number]   :  1975   Account #:  [de-identified]   Date of Adm:  10/21/2017       DATE OF SURGERY: 10/21/2017    PREOPERATIVE DIAGNOSIS: Left mid ureteral stone. POSTOPERATIVE DIAGNOSIS: Left mid ureteral stone. NAME OF PROCEDURE   1. Left ureteroscopy with laser lithotripsy. 2. Left ureteral stent placement. SURGEON: Basil Gaines. Emilia Garcia MD.    ANESTHESIA: General.    DRAINS: A 7 x 24 double-J stent within the left ureter. BLOOD LOSS: Minimal.    COMPLICATIONS: None. SPECIMEN: None. NARRATIVE: The patient was taken to the OR, and after adequate   general anesthesia was achieved, she was placed in the dorsal   lithotomy position and prepped and draped in usual sterile   fashion. Under fluoroscopy, I could clearly see an approximately   6 mm calcific density opposite the transverse process of L3 on   the left side. Cystoscopy was performed with a 22-Maori   cystoscope. The urethra was unremarkable. Once within the   bladder, it was noted that there were no mucosal lesions. Both   ureteral orifices were normal in size, shape, and position. There was no trabeculation or cellule formation. A 0.03 Sebastian-coated floppy-tipped guidewire was fed up the left   ureter under fluoroscopic control. Unfortunately, the stone   proved to be relatively highly impacted, and I was unable to   maneuver this wire passed the stone. I removed this wire and   brought a 0.038 Glidewire with a floppy tip onto the field. This   was passed through the cystoscope and up the ureter under   fluoroscopic control. I was able to maneuver this past the   stone, and it was advanced into the renal pelvis where it was   seen to coil. Cystoscope was removed leaving the wire behind. A   6-Maori semi-rigid ureteroscope was then passed through the   urethra into the bladder.  I was able to maneuver this up the   ureter all the way up to the stone. A 365 micron holmium laser   fiber was introduced at a setting of 500 mJ and 5 Hz. The stone   was reduced to extremely small fragments. The ureteroscope was   then removed, and the cystoscope was fed back in over the wire. A 7 x 24 double-J stent was then passed over the wire and up the   ureter under fluoroscopic control. Once the stent was seen to be   in appropriate position, the wire was pulled. It was noted that   the proximal end coiled in the renal pelvis, distal end coiled   in appropriate position within the bladder. I did leave a length   of suture extruding from the meatus to aid in removal at a later   date. The bladder was then thoroughly emptied. All instruments   removed. The patient was taken down out of dorsal lithotomy   position, awakened, extubated, and taken from the OR without any   further incident or complaint.         MD SHYAM Monsivais / MILY   D:  10/22/2017   17:06   T:  10/23/2017   07:12   Job #:  [id number]

## 2018-06-22 ENCOUNTER — HOSPITAL ENCOUNTER (OUTPATIENT)
Dept: SURGERY | Age: 43
Discharge: HOME | End: 2018-06-22
Payer: SUBSIDIZED

## 2018-06-22 VITALS
TEMPERATURE: 98.1 F | DIASTOLIC BLOOD PRESSURE: 62 MMHG | HEART RATE: 75 BPM | HEIGHT: 63 IN | WEIGHT: 152.25 LBS | RESPIRATION RATE: 18 BRPM | SYSTOLIC BLOOD PRESSURE: 107 MMHG | OXYGEN SATURATION: 100 % | BODY MASS INDEX: 26.98 KG/M2

## 2018-06-22 LAB
ANION GAP SERPL CALC-SCNC: 9 MMOL/L (ref 7–16)
BUN SERPL-MCNC: 13 MG/DL (ref 6–23)
CALCIUM SERPL-MCNC: 8.8 MG/DL (ref 8.3–10.4)
CHLORIDE SERPL-SCNC: 107 MMOL/L (ref 98–107)
CO2 SERPL-SCNC: 26 MMOL/L (ref 21–32)
CREAT SERPL-MCNC: 0.8 MG/DL (ref 0.6–1)
ERYTHROCYTE [DISTWIDTH] IN BLOOD BY AUTOMATED COUNT: 12.3 % (ref 11.9–14.6)
GLUCOSE SERPL-MCNC: 86 MG/DL (ref 65–100)
HCT VFR BLD AUTO: 38.7 % (ref 35.8–46.3)
HGB BLD-MCNC: 13.5 G/DL (ref 11.7–15.4)
MCH RBC QN AUTO: 31.3 PG (ref 26.1–32.9)
MCHC RBC AUTO-ENTMCNC: 34.9 G/DL (ref 31.4–35)
MCV RBC AUTO: 89.6 FL (ref 79.6–97.8)
PLATELET # BLD AUTO: 227 K/UL (ref 150–450)
PMV BLD AUTO: 10.4 FL (ref 10.8–14.1)
POTASSIUM SERPL-SCNC: 3.8 MMOL/L (ref 3.5–5.1)
RBC # BLD AUTO: 4.32 M/UL (ref 4.05–5.25)
SODIUM SERPL-SCNC: 142 MMOL/L (ref 136–145)
WBC # BLD AUTO: 6.4 K/UL (ref 4.3–11.1)

## 2018-06-22 PROCEDURE — 85027 COMPLETE CBC AUTOMATED: CPT | Performed by: UROLOGY

## 2018-06-22 PROCEDURE — 80048 BASIC METABOLIC PNL TOTAL CA: CPT | Performed by: UROLOGY

## 2018-06-22 NOTE — PERIOP NOTES
Patient verified name, , and surgery as listed in [facility]. Patient provided medical/health information and PTA medications to the best of their ability. TYPE  CASE:1B  Orders per surgeon: Received and dated 18. Labs per surgeon:cbc,bmp. Results: pending  Labs per anesthesia protocol: none. EKG  :  Not needed at time of PAT    Patient provided with and instructed on education handouts including Guide to Surgery, blood transfusions, pain management, and hand hygiene for the family and community, and [facility] brochure. soap and instructions given per hospital policy. Instructed patient to continue previous medications as prescribed prior to surgery unless otherwise directed and to take the following medications the day of surgery according to anesthesia guidelines : Atenolol, Norco, Urocit . Instructed patient to hold  the following medications: none. Original medication prescription bottles not visualized during patient appointment. Patient teach back successful and patient demonstrates knowledge of instruction.

## 2018-06-22 NOTE — PERIOP NOTES
Recent Results (from the past 12 hour(s))   CBC W/O DIFF    Collection Time: 06/22/18 10:26 AM   Result Value Ref Range    WBC 6.4 4.3 - 11.1 K/uL    RBC 4.32 4.05 - 5.25 M/uL    HGB 13.5 11.7 - 15.4 g/dL    HCT 38.7 35.8 - 46.3 %    MCV 89.6 79.6 - 97.8 FL    MCH 31.3 26.1 - 32.9 PG    MCHC 34.9 31.4 - 35.0 g/dL    RDW 12.3 11.9 - 14.6 %    PLATELET 961 245 - 747 K/uL    MPV 10.4 (L) 10.8 - 96.6 FL   METABOLIC PANEL, BASIC    Collection Time: 06/22/18 10:26 AM   Result Value Ref Range    Sodium 142 136 - 145 mmol/L    Potassium 3.8 3.5 - 5.1 mmol/L    Chloride 107 98 - 107 mmol/L    CO2 26 21 - 32 mmol/L    Anion gap 9 7 - 16 mmol/L    Glucose 86 65 - 100 mg/dL    BUN 13 6 - 23 MG/DL    Creatinine 0.80 0.6 - 1.0 MG/DL    GFR est AA >60 >60 ml/min/1.73m2    GFR est non-AA >60 >60 ml/min/1.73m2    Calcium 8.8 8.3 - 10.4 MG/DL   Reviewed

## 2018-06-29 ENCOUNTER — ANESTHESIA EVENT (OUTPATIENT)
Dept: SURGERY | Age: 43
End: 2018-06-29
Payer: MEDICAID

## 2018-06-29 ENCOUNTER — HOSPITAL ENCOUNTER (OUTPATIENT)
Age: 43
Setting detail: OUTPATIENT SURGERY
Discharge: HOME | End: 2018-06-29
Attending: UROLOGY | Admitting: UROLOGY
Payer: MEDICAID

## 2018-06-29 ENCOUNTER — ANESTHESIA (OUTPATIENT)
Dept: SURGERY | Age: 43
End: 2018-06-29
Payer: MEDICAID

## 2018-06-29 VITALS
OXYGEN SATURATION: 99 % | BODY MASS INDEX: 26.75 KG/M2 | RESPIRATION RATE: 14 BRPM | DIASTOLIC BLOOD PRESSURE: 59 MMHG | HEART RATE: 67 BPM | HEIGHT: 63 IN | WEIGHT: 151 LBS | SYSTOLIC BLOOD PRESSURE: 104 MMHG | TEMPERATURE: 99.5 F

## 2018-06-29 PROCEDURE — 77030032490 HC SLV COMPR SCD KNE COVD -B: Performed by: UROLOGY

## 2018-06-29 PROCEDURE — 74011250636 HC RX REV CODE- 250/636

## 2018-06-29 PROCEDURE — 50590 FRAGMENTING OF KIDNEY STONE: CPT | Performed by: UROLOGY

## 2018-06-29 PROCEDURE — 74011250637 HC RX REV CODE- 250/637: Performed by: ANESTHESIOLOGY

## 2018-06-29 PROCEDURE — 77030019908 HC STETH ESOPH SIMS -A: Performed by: ANESTHESIOLOGY

## 2018-06-29 PROCEDURE — 74011000258 HC RX REV CODE- 258: Performed by: UROLOGY

## 2018-06-29 PROCEDURE — 74011000250 HC RX REV CODE- 250

## 2018-06-29 PROCEDURE — 76210000063 HC OR PH I REC FIRST 0.5 HR: Performed by: UROLOGY

## 2018-06-29 PROCEDURE — 74011250636 HC RX REV CODE- 250/636: Performed by: UROLOGY

## 2018-06-29 PROCEDURE — 76060000033 HC ANESTHESIA 1 TO 1.5 HR: Performed by: UROLOGY

## 2018-06-29 PROCEDURE — 76010000138 HC OR TIME 0.5 TO 1 HR: Performed by: UROLOGY

## 2018-06-29 PROCEDURE — 77030020143 HC AIRWY LARYN INTUB CGAS -A: Performed by: ANESTHESIOLOGY

## 2018-06-29 PROCEDURE — 76210000020 HC REC RM PH II FIRST 0.5 HR: Performed by: UROLOGY

## 2018-06-29 PROCEDURE — 74011250636 HC RX REV CODE- 250/636: Performed by: ANESTHESIOLOGY

## 2018-06-29 RX ORDER — OXYCODONE AND ACETAMINOPHEN 5; 325 MG/1; MG/1
1 TABLET ORAL AS NEEDED
Status: DISCONTINUED | OUTPATIENT
Start: 2018-06-29 | End: 2018-06-29 | Stop reason: HOSPADM

## 2018-06-29 RX ORDER — LIDOCAINE HYDROCHLORIDE 20 MG/ML
INJECTION, SOLUTION EPIDURAL; INFILTRATION; INTRACAUDAL; PERINEURAL AS NEEDED
Status: DISCONTINUED | OUTPATIENT
Start: 2018-06-29 | End: 2018-06-29 | Stop reason: HOSPADM

## 2018-06-29 RX ORDER — FAMOTIDINE 20 MG/1
20 TABLET, FILM COATED ORAL ONCE
Status: COMPLETED | OUTPATIENT
Start: 2018-06-29 | End: 2018-06-29

## 2018-06-29 RX ORDER — LIDOCAINE HYDROCHLORIDE 10 MG/ML
0.1 INJECTION INFILTRATION; PERINEURAL AS NEEDED
Status: DISCONTINUED | OUTPATIENT
Start: 2018-06-29 | End: 2018-06-29 | Stop reason: HOSPADM

## 2018-06-29 RX ORDER — SODIUM CHLORIDE, SODIUM LACTATE, POTASSIUM CHLORIDE, CALCIUM CHLORIDE 600; 310; 30; 20 MG/100ML; MG/100ML; MG/100ML; MG/100ML
75 INJECTION, SOLUTION INTRAVENOUS CONTINUOUS
Status: DISCONTINUED | OUTPATIENT
Start: 2018-06-29 | End: 2018-06-29 | Stop reason: HOSPADM

## 2018-06-29 RX ORDER — PROPOFOL 10 MG/ML
INJECTION, EMULSION INTRAVENOUS AS NEEDED
Status: DISCONTINUED | OUTPATIENT
Start: 2018-06-29 | End: 2018-06-29 | Stop reason: HOSPADM

## 2018-06-29 RX ORDER — SODIUM CHLORIDE 0.9 % (FLUSH) 0.9 %
5-10 SYRINGE (ML) INJECTION AS NEEDED
Status: DISCONTINUED | OUTPATIENT
Start: 2018-06-29 | End: 2018-06-29 | Stop reason: HOSPADM

## 2018-06-29 RX ORDER — NALOXONE HYDROCHLORIDE 0.4 MG/ML
0.2 INJECTION, SOLUTION INTRAMUSCULAR; INTRAVENOUS; SUBCUTANEOUS AS NEEDED
Status: DISCONTINUED | OUTPATIENT
Start: 2018-06-29 | End: 2018-06-29 | Stop reason: HOSPADM

## 2018-06-29 RX ORDER — DEXAMETHASONE SODIUM PHOSPHATE 4 MG/ML
INJECTION, SOLUTION INTRA-ARTICULAR; INTRALESIONAL; INTRAMUSCULAR; INTRAVENOUS; SOFT TISSUE AS NEEDED
Status: DISCONTINUED | OUTPATIENT
Start: 2018-06-29 | End: 2018-06-29 | Stop reason: HOSPADM

## 2018-06-29 RX ORDER — ONDANSETRON 2 MG/ML
INJECTION INTRAMUSCULAR; INTRAVENOUS AS NEEDED
Status: DISCONTINUED | OUTPATIENT
Start: 2018-06-29 | End: 2018-06-29 | Stop reason: HOSPADM

## 2018-06-29 RX ORDER — MIDAZOLAM HYDROCHLORIDE 1 MG/ML
2 INJECTION, SOLUTION INTRAMUSCULAR; INTRAVENOUS
Status: DISCONTINUED | OUTPATIENT
Start: 2018-06-29 | End: 2018-06-29 | Stop reason: HOSPADM

## 2018-06-29 RX ORDER — HYDROMORPHONE HYDROCHLORIDE 2 MG/ML
0.5 INJECTION, SOLUTION INTRAMUSCULAR; INTRAVENOUS; SUBCUTANEOUS
Status: DISCONTINUED | OUTPATIENT
Start: 2018-06-29 | End: 2018-06-29 | Stop reason: HOSPADM

## 2018-06-29 RX ADMIN — DEXAMETHASONE SODIUM PHOSPHATE 4 MG: 4 INJECTION, SOLUTION INTRA-ARTICULAR; INTRALESIONAL; INTRAMUSCULAR; INTRAVENOUS; SOFT TISSUE at 10:54

## 2018-06-29 RX ADMIN — ONDANSETRON 4 MG: 2 INJECTION INTRAMUSCULAR; INTRAVENOUS at 11:23

## 2018-06-29 RX ADMIN — PROPOFOL 200 MG: 10 INJECTION, EMULSION INTRAVENOUS at 10:49

## 2018-06-29 RX ADMIN — FAMOTIDINE 20 MG: 20 TABLET, FILM COATED ORAL at 09:56

## 2018-06-29 RX ADMIN — GENTAMICIN SULFATE 80 MG: 40 INJECTION, SOLUTION INTRAMUSCULAR; INTRAVENOUS at 10:51

## 2018-06-29 RX ADMIN — SODIUM CHLORIDE, SODIUM LACTATE, POTASSIUM CHLORIDE, AND CALCIUM CHLORIDE 75 ML/HR: 600; 310; 30; 20 INJECTION, SOLUTION INTRAVENOUS at 09:56

## 2018-06-29 RX ADMIN — LIDOCAINE HYDROCHLORIDE 100 MG: 20 INJECTION, SOLUTION EPIDURAL; INFILTRATION; INTRACAUDAL; PERINEURAL at 10:49

## 2018-06-29 NOTE — ANESTHESIA PREPROCEDURE EVALUATION
Anesthetic History     Increased risk of difficult airway (no difficult intubation, just sore throat after intubation x 1) and PONV          Review of Systems / Medical History  Patient summary reviewed and pertinent labs reviewed    Pulmonary  Within defined limits                 Neuro/Psych   Within defined limits           Cardiovascular    Hypertension: well controlled        Dysrhythmias       Exercise tolerance: >4 METS  Comments: Hx PSVT--s/p ablation; on atenolol   GI/Hepatic/Renal     GERD: well controlled    Renal disease: stones  Hiatal hernia     Endo/Other  Within defined limits           Other Findings   Comments: ECHO normal           Physical Exam    Airway  Mallampati: II  TM Distance: 4 - 6 cm  Neck ROM: normal range of motion   Mouth opening: Normal     Cardiovascular    Rhythm: regular           Dental  No notable dental hx       Pulmonary  Breath sounds clear to auscultation               Abdominal         Other Findings            Anesthetic Plan    ASA: 2  Anesthesia type: general          Induction: Intravenous  Anesthetic plan and risks discussed with: Patient

## 2018-06-29 NOTE — PERIOP NOTES
Preoperative flank skin condition: clear  Lead shield: YES/  Patient ear protection:  YES/  Gel applied to patient's flank and Lithotripsy head:YES  Starting power level: 3  Shock start time (first  fluoroscopy): 1054  Shock stop time (last lithotripsy shock): 1133  Ending power level: 11  Total shocks: 3000  Total fluoroscopy time: 5.50  Postoperative flank skin condition: PINK

## 2018-06-29 NOTE — DISCHARGE INSTRUCTIONS
ACTIVITY  · As tolerated and as directed by your doctor. · Walking and mild exercise help to pass the stone fragments. DIET  · Clear liquids until no nausea and vomiting; then resume light diet for the first day  · Advance to regular diet on second day, unless your doctor orders otherwise. · If nauseated and/ or vomiting, call your doctor. · Drink at least 8 glasses of water a day (avoid caffeinated beverages). PAIN  · Take pain medication as directed. · Call your doctor if pain is NOT relieved by medication. · DO NOT take aspirin or blood thinners until directed by your doctor. CALL YOUR DOCTOR IF   · Expect blood-tinged urine. Call your doctor if it lasts more than 72 hours or if you cannot see through the urine. · Aches, chills, or fever of 101 degree F or above    Lithotripsy does not make your stone disappear. The treatment is meant to crush your stone so that the fragments can be passed. Strain your urine, save any fragments, and take them to your doctor. The fragments may not begin to pass for up to 1-2 months. You may experience slight bruising at the treated site. DISCHARGE SUMMARY from Nurse    PATIENT INSTRUCTIONS:      After general anesthesia or intravenous sedation, for 24 hours or while taking prescription Narcotics:  · Limit your activities  · Do not drive and operate hazardous machinery  · Do not make important personal or business decisions  · Do  not drink alcoholic beverages  · If you have not urinated within 8 hours after discharge, please contact your surgeon on call. *  Please give a list of your current medications to your Primary Care Provider. *  Please update this list whenever your medications are discontinued, doses are      changed, or new medications (including over-the-counter products) are added. *  Please carry medication information at all times in case of emergency situations.       These are general instructions for a healthy lifestyle:    No smoking/ No tobacco products/ Avoid exposure to second hand smoke    Surgeon General's Warning:  Quitting smoking now greatly reduces serious risk to your health. Obesity, smoking, and sedentary lifestyle greatly increases your risk for illness    A healthy diet, regular physical exercise & weight monitoring are important for maintaining a healthy lifestyle    You may be retaining fluid if you have a history of heart failure or if you experience any of the following symptoms:  Weight gain of 3 pounds or more overnight or 5 pounds in a week, increased swelling in our hands or feet or shortness of breath while lying flat in bed. Please call your doctor as soon as you notice any of these symptoms; do not wait until your next office visit. Recognize signs and symptoms of STROKE:    F-face looks uneven    A-arms unable to move or move unevenly    S-speech slurred or non-existent    T-time-call 911 as soon as signs and symptoms begin-DO NOT go       Back to bed or wait to see if you get better-TIME IS BRAIN. TYPICAL SIDE EFFECTS OF PAIN MEDICATION:  *    Constipation: Drink lots of fluids. Over the counter stool softener if needed. *    Nausea: Take pain medication with food. Call your doctor with persistent nausea. ACTIVITY  · As tolerated and as directed by your doctor. · Bathe or shower as directed by your doctor. DIET  · Day of surgery: Clear liquids until no nausea or vomiting; small portion, light diet [place] foods (ex: baked chicken, plain rice, grits, scrambled eggs, toast). Nothing greasy, fried or spicy today. · Advance to regular diet on second day, unless your doctor orders otherwise. · If nausea and vomiting continues, call your doctor. PAIN  · Take pain medication as directed by your doctor. · DO NOT take aspirin or blood thinners unless directed by your doctor.        CALL YOUR DOCTOR IF    s Call your doctor if pain is NOT relieved by medication.   s Excessive bleeding that does not stop after holding pressure over the area  · Temperature of 101 degrees F or above  · Excessive redness, swelling or bruising, and/ or green or yellow, smelly discharge from incision    AFTER ANESTHESIA   · For the first 24 hours: DO NOT Drive, Drink alcoholic beverages, or Make important decisions. · Be aware of dizziness following anesthesia and while taking pain medication. DISCHARGE SUMMARY from Nurse    PATIENT INSTRUCTIONS:    After general anesthesia or intravenous sedation, for 24 hours or while taking prescription Narcotics:  · Limit your activities  · Do not drive and operate hazardous machinery  · Do not make important personal or business decisions  · Do  not drink alcoholic beverages  · If you have not urinated within 8 hours after discharge, please contact your surgeon on call. *  Please give a list of your current medications to your Primary Care Provider. *  Please update this list whenever your medications are discontinued, doses are      changed, or new medications (including over-the-counter products) are added. *  Please carry medication information at all times in case of emergency situations. Preventing Infection at Home  We care about preventing infection and avoiding the spread of germs - not only when you are in the hospital but also when you return home. When you return home from the hospital, its important to take the following steps to help prevent infection and avoid spreading germs that could infect you and others. Ask everyone in your home to follow these guidelines, too. Clean Your Hands  · Clean your hands whenever your hands are visibly dirty, before you eat, before or after touching your mouth, nose or eyes, and before preparing food. Clean them after contact with body fluids, using the restroom, touching animals or changing diapers. · When washing hands, wet them with warm water and work up a lather.  Rub hands for at least 15 seconds, then rinse them and pat them dry with a clean towel or paper towel. · When using hand sanitizers, it should take about 15 seconds to rub your hands dry. If not, you probably didnt apply enough . Cover Your Sneeze or Cough  Germs are released into the air whenever you sneeze or cough. To prevent the spread of infection:  · Turn away from other people before coughing or sneezing. · Cover your mouth or nose with a tissue when you cough or sneeze. Put the tissue in the trash. · If you dont have a tissue, cough or sneeze into your upper sleeve, not your hands. · Always clean your hands after coughing or sneezing. Care for Wounds  Your skin is your bodys first line of defense against germs, but an open wound leaves an easy way for germs to enter your body. To prevent infection:  · Clean your hands before and after changing wound dressings, and wear gloves to change dressings if recommended by your doctor. · Take special care with IV lines or other devices inserted into the body. If you must touch them, clean your hands first.  · Follow any specific instructions from your doctor to care for your wounds. Contact your doctor if you experience any signs of infection, such as fever or increased redness at the surgical or wound site. Keep a Clean Home  · Clean or wipe commonly touched hard surfaces like door handles, sinks, tabletops, phones and TV remotes. · Use products labeled disinfectant to kill harmful bacteria and viruses. · Use a clean cloth or paper towel to clean and dry surfaces. Wiping surfaces with a dirty dishcloth, sponge or towel will only spread germs. · Never share toothbrushes, [place], drinking glasses, utensils, razor blades, face cloths or bath towels to avoid spreading germs. · Be sure that the linens that you sleep on are clean. · Keep pets away from wounds and wash your hands after touching pets, their toys or bedding. We care about you and your health. Remember, preventing infections is a team effort between you, your family, friends and health care providers. These are general instructions for a healthy lifestyle:    No smoking/ No tobacco products/ Avoid exposure to second hand smoke    Surgeon General's Warning:  Quitting smoking now greatly reduces serious risk to your health. Obesity, smoking, and sedentary lifestyle greatly increases your risk for illness    A healthy diet, regular physical exercise & weight monitoring are important for maintaining a healthy lifestyle    You may be retaining fluid if you have a history of heart failure or if you experience any of the following symptoms:  Weight gain of 3 pounds or more overnight or 5 pounds in a week, increased swelling in our hands or feet or shortness of breath while lying flat in bed. Please call your doctor as soon as you notice any of these symptoms; do not wait until your next office visit. Recognize signs and symptoms of STROKE:    F-face looks uneven    A-arms unable to move or move unevenly    S-speech slurred or non-existent    T-time-call 911 as soon as signs and symptoms begin-DO NOT go       Back to bed or wait to see if you get better-TIME IS BRAIN.

## 2018-06-29 NOTE — IP AVS SNAPSHOT
[address] 
 Patient: Alonzo Kimbrough MRN: [id number] :[id number] About your hospitalization You were admitted on:  2018 You last received care in the:  [facility] OP PACU You were discharged on:  2018 Why you were hospitalized Your primary diagnosis was:  Not on File Follow-up Information Follow up With Details Comments Contact Info Radha Salomon MD   [address] 
[phone] Kaye Talamantes MD  Keep scheduled follow up on 18. [address] [facility] [address] 
[phone] Your Scheduled Appointments 2018  1:30 PM EDT Office Visit with Kaye Talamantes MD  
[facility] 48 ([address]) [address] [place] [address]  
[phone] Discharge Orders None A check chiki indicates which time of day the medication should be taken. My Medications CONTINUE taking these medications Instructions Each Dose to Equal  
 Morning Noon Evening Bedtime  
 atenolol 25 mg tablet Commonly known as:  TENORMIN Your last dose was: Your next dose is: Take 12.5 mg by mouth every morning. 12.5 mg  
    
   
   
   
  
 HYDROcodone-acetaminophen 7.5-325 mg per tablet Commonly known as:  Viva Sanders Your last dose was: Your next dose is: Take 1 Tab by mouth every four (4) hours as needed for Pain. Max Daily Amount: 6 Tabs. 1 Tab Mth-Me Blue-Sod Phos-PhSal-Hyo 118-10-40.8-36 mg Cap capsule Commonly known as:  [place] Ashlie Your last dose was: Your next dose is: Take 1 Cap by mouth four (4) times daily as needed. 1 Cap  
    
   
   
   
  
 potassium citrate 10 mEq (1,080 mg) J Luis Kiser Commonly known as:  UROCIT-K 10  
   
 Your last dose was: Your next dose is: Take 1 Tab by mouth two (2) times a day. 10 mEq Opioid Education Prescription Opioids: What You Need to Know: 
 
Prescription opioids can be used to help relieve moderate-to-severe pain and are often prescribed following a surgery or injury, or for certain health conditions. These medications can be an important part of treatment but also come with serious risks. Opioids are strong pain medicines. Examples include hydrocodone, oxycodone, fentanyl, and morphine. Heroin is an example of an illegal opioid. It is important to work with your health care provider to make sure you are getting the safest, most effective care. WHAT ARE THE RISKS AND SIDE EFFECTS OF OPIOID USE? Prescription opioids carry serious risks of addiction and overdose, especially with prolonged use. An opioid overdose, often marked by slow breathing, can cause sudden death. The use of prescription opioids can have a number of side effects as well, even when taken as directed. · Tolerance-meaning you might need to take more of a medication for the same pain relief · Physical dependence-meaning you have symptoms of withdrawal when the medication is stopped. Withdrawal symptoms can include nausea, sweating, chills, diarrhea, stomach cramps, and muscle aches. Withdrawal can last up to several weeks, depending on which drug you took and how long you took it. · Increased sensitivity to pain · Constipation · Nausea, vomiting, and dry mouth · Sleepiness and dizziness · Confusion · Depression · Low levels of testosterone that can result in lower sex drive, energy, and strength · Itching and sweating RISKS ARE GREATER WITH:      
· History of drug misuse, substance use disorder, or overdose · Mental health conditions (such as depression or anxiety) · Sleep apnea · Older age (72 years or older) · Pregnancy Avoid alcohol while taking prescription opioids. Also, unless specifically advised by your health care provider, medications to avoid include: · Benzodiazepines (such as Xanax or Valium) · Muscle relaxants (such as Soma or Flexeril) · Hypnotics (such as Ambien or Lunesta) · Other prescription opioids KNOW YOUR OPTIONS Talk to your health care provider about ways to manage your pain that don't involve prescription opioids. Some of these options may actually work better and have fewer risks and side effects. Options may include: 
· Pain relievers such as acetaminophen, ibuprofen, and naproxen · Some medications that are also used for depression or seizures · Physical therapy and exercise · Counseling to help patients learn how to cope better with triggers of pain and stress. · Application of heat or cold compress · Massage therapy · Relaxation techniques Be Informed Make sure you know the name of your medication, how much and how often to take it, and its potential risks & side effects. IF YOU ARE PRESCRIBED OPIOIDS FOR PAIN: 
· Never take opioids in greater amounts or more often than prescribed. Remember the goal is not to be pain-free but to manage your pain at a tolerable level. · Follow up with your primary care provider to: · Work together to create a plan on how to manage your pain. · Talk about ways to help manage your pain that don't involve prescription opioids. · Talk about any and all concerns and side effects. · Help prevent misuse and abuse. · Never sell or share prescription opioids · Help prevent misuse and abuse. · Store prescription opioids in a secure place and out of reach of others (this may include visitors, children, friends, and family).  
· Safely dispose of unused/unwanted prescription opioids: Find your community drug take-back program or your pharmacy mail-back program, or flush them down the toilet, following guidance from the Food and Drug Administration (www.fda.gov/Drugs/ResourcesForYou). · Visit www.cdc.gov/drugoverdose to learn about the risks of opioid abuse and overdose. · If you believe you may be struggling with addiction, tell your health care provider and ask for guidance or call Seth Pedraza at 5-148-219-KHHJ. Discharge Instructions ACTIVITY · As tolerated and as directed by your doctor. · Walking and mild exercise help to pass the stone fragments. DIET · Clear liquids until no nausea and vomiting; then resume light diet for the first day · Advance to regular diet on second day, unless your doctor orders otherwise. · If nauseated and/ or vomiting, call your doctor. · Drink at least 8 glasses of water a day (avoid caffeinated beverages). PAIN 
· Take pain medication as directed. · Call your doctor if pain is NOT relieved by medication. · DO NOT take aspirin or blood thinners until directed by your doctor. CALL YOUR DOCTOR IF  
· Expect blood-tinged urine. Call your doctor if it lasts more than 72 hours or if you cannot see through the urine. · Aches, chills, or fever of 101 degree F or above Lithotripsy does not make your stone disappear. The treatment is meant to crush your stone so that the fragments can be passed. Strain your urine, save any fragments, and take them to your doctor. The fragments may not begin to pass for up to 1-2 months. You may experience slight bruising at the treated site. DISCHARGE SUMMARY from Nurse PATIENT INSTRUCTIONS: 
 
 
After general anesthesia or intravenous sedation, for 24 hours or while taking prescription Narcotics: · Limit your activities · Do not drive and operate hazardous machinery · Do not make important personal or business decisions · Do  not drink alcoholic beverages · If you have not urinated within 8 hours after discharge, please contact your surgeon on call. *  Please give a list of your current medications to your Primary Care Provider. *  Please update this list whenever your medications are discontinued, doses are 
    changed, or new medications (including over-the-counter products) are added. *  Please carry medication information at all times in case of emergency situations. These are general instructions for a healthy lifestyle: No smoking/ No tobacco products/ Avoid exposure to second hand smoke Surgeon General's Warning:  Quitting smoking now greatly reduces serious risk to your health. Obesity, smoking, and sedentary lifestyle greatly increases your risk for illness A healthy diet, regular physical exercise & weight monitoring are important for maintaining a healthy lifestyle You may be retaining fluid if you have a history of heart failure or if you experience any of the following symptoms:  Weight gain of 3 pounds or more overnight or 5 pounds in a week, increased swelling in our hands or feet or shortness of breath while lying flat in bed. Please call your doctor as soon as you notice any of these symptoms; do not wait until your next office visit. Recognize signs and symptoms of STROKE: 
 
F-face looks uneven A-arms unable to move or move unevenly S-speech slurred or non-existent T-time-call 911 as soon as signs and symptoms begin-DO NOT go Back to bed or wait to see if you get better-TIME IS BRAIN. TYPICAL SIDE EFFECTS OF PAIN MEDICATION: 
*    Constipation: Drink lots of fluids. Over the counter stool softener if needed. *    Nausea: Take pain medication with food. Call your doctor with persistent nausea. ACTIVITY · As tolerated and as directed by your doctor. · Bathe or shower as directed by your doctor. DIET 
· Day of surgery: Clear liquids until no nausea or vomiting; small portion, light diet [place] foods (ex: baked chicken, plain rice, grits, scrambled eggs, toast). Nothing greasy, fried or spicy today. · Advance to regular diet on second day, unless your doctor orders otherwise. · If nausea and vomiting continues, call your doctor. PAIN 
· Take pain medication as directed by your doctor. · DO NOT take aspirin or blood thinners unless directed by your doctor. CALL YOUR DOCTOR IF   
s Call your doctor if pain is NOT relieved by medication.  
s Excessive bleeding that does not stop after holding pressure over the area · Temperature of 101 degrees F or above · Excessive redness, swelling or bruising, and/ or green or yellow, smelly discharge from incision AFTER ANESTHESIA · For the first 24 hours: DO NOT Drive, Drink alcoholic beverages, or Make important decisions. · Be aware of dizziness following anesthesia and while taking pain medication. DISCHARGE SUMMARY from Nurse PATIENT INSTRUCTIONS: 
 
After general anesthesia or intravenous sedation, for 24 hours or while taking prescription Narcotics: · Limit your activities · Do not drive and operate hazardous machinery · Do not make important personal or business decisions · Do  not drink alcoholic beverages · If you have not urinated within 8 hours after discharge, please contact your surgeon on call. *  Please give a list of your current medications to your Primary Care Provider. *  Please update this list whenever your medications are discontinued, doses are 
    changed, or new medications (including over-the-counter products) are added. *  Please carry medication information at all times in case of emergency situations. Preventing Infection at Home We care about preventing infection and avoiding the spread of germs  not only when you are in the hospital but also when you return home.  When you return home from the hospital, its important to take the following steps to help prevent infection and avoid spreading germs that could infect you and others. Ask everyone in your home to follow these guidelines, too. Clean Your Hands · Clean your hands whenever your hands are visibly dirty, before you eat, before or after touching your mouth, nose or eyes, and before preparing food. Clean them after contact with body fluids, using the restroom, touching animals or changing diapers. · When washing hands, wet them with warm water and work up a lather. Rub hands for at least 15 seconds, then rinse them and pat them dry with a clean towel or paper towel. · When using hand sanitizers, it should take about 15 seconds to rub your hands dry. If not, you probably didnt apply enough . Cover Your Sneeze or Cough Germs are released into the air whenever you sneeze or cough. To prevent the spread of infection: · Turn away from other people before coughing or sneezing. · Cover your mouth or nose with a tissue when you cough or sneeze. Put the tissue in the trash. · If you dont have a tissue, cough or sneeze into your upper sleeve, not your hands. · Always clean your hands after coughing or sneezing. Care for Wounds Your skin is your bodys first line of defense against germs, but an open wound leaves an easy way for germs to enter your body. To prevent infection: · Clean your hands before and after changing wound dressings, and wear gloves to change dressings if recommended by your doctor. · Take special care with IV lines or other devices inserted into the body. If you must touch them, clean your hands first. 
· Follow any specific instructions from your doctor to care for your wounds. Contact your doctor if you experience any signs of infection, such as fever or increased redness at the surgical or wound site. Keep a [address] · Clean or wipe commonly touched hard surfaces like door handles, sinks, tabletops, phones and TV remotes. · Use products labeled disinfectant to kill harmful bacteria and viruses. · Use a clean cloth or paper towel to clean and dry surfaces. Wiping surfaces with a dirty dishcloth, sponge or towel will only spread germs. · Never share toothbrushes, edmond, drinking glasses, utensils, razor blades, face cloths or bath towels to avoid spreading germs. · Be sure that the linens that you sleep on are clean. · Keep pets away from wounds and wash your hands after touching pets, their toys or bedding. We care about you and your health. Remember, preventing infections is a team effort between you, your family, friends and health care providers. These are general instructions for a healthy lifestyle: No smoking/ No tobacco products/ Avoid exposure to second hand smoke Surgeon General's Warning:  Quitting smoking now greatly reduces serious risk to your health. Obesity, smoking, and sedentary lifestyle greatly increases your risk for illness A healthy diet, regular physical exercise & weight monitoring are important for maintaining a healthy lifestyle You may be retaining fluid if you have a history of heart failure or if you experience any of the following symptoms:  Weight gain of 3 pounds or more overnight or 5 pounds in a week, increased swelling in our hands or feet or shortness of breath while lying flat in bed. Please call your doctor as soon as you notice any of these symptoms; do not wait until your next office visit. Recognize signs and symptoms of STROKE: 
 
F-face looks uneven A-arms unable to move or move unevenly S-speech slurred or non-existent T-time-call 911 as soon as signs and symptoms begin-DO NOT go Back to bed or wait to see if you get better-TIME IS BRAIN. Introducing [facility]! Dear March Border: 
Thank you for requesting a RateItAll account. Our records indicate that you already have an active RateItAll account. You can access your account anytime at [email or web address] Did you know that you can access your hospital and ER discharge instructions at any time in Crow[employer]? You can also review all of your test results from your hospital stay or ER visit. Additional Information If you have questions, please visit the Frequently Asked Questions section of the SnapHealtht website at [email or web address] Remember, [employer] is NOT to be used for urgent needs. For medical emergencies, dial 911. Now available from your iPhone and Android! Introducing Jai Michel As a [employer] patient, I wanted to make you aware of our electronic visit tool called Jai Michel. [email or web address] allows you to connect within minutes with a medical provider 24 hours a day, seven days a week via a mobile device or tablet or logging into a secure website from your computer. You can access Jai Michel from anywhere in the [place]. A virtual visit might be right for you when you have a simple condition and feel like you just dont want to get out of bed, or cant get away from work for an appointment, when your regular [facility] provider is not available (evenings, weekends or holidays), or when youre out of town and need minor care. Electronic visits cost only $49 and if the [email or web address] provider determines a prescription is needed to treat your condition, one can be electronically transmitted to a nearby pharmacy*. Please take a moment to enroll today if you have not already done so. The enrollment process is free and takes just a few minutes. To enroll, please download the [email or web address] justus to your tablet or phone, or visit [email or web address] to enroll on your computer. And, as an [address] [place] [address] patient with a [employer]ham account, the results of your visits will be scanned into your electronic medical record and your primary care provider will be able to view the scanned results. We urge you to continue to see your regular Inessa Michelle provider for your ongoing medical care. And while your primary care provider may not be the one available when you seek a Jai Bubbacaro virtual visit, the peace of mind you get from getting a real diagnosis real time can be priceless. For more information on Jai Macnaefin, view our Frequently Asked Questions (FAQs) at [email or web address]. Sincerely, 
 
Teri Jane MD 
Chief Medical Officer [address] *:  certain medications cannot be prescribed via Jai Michel Providers Seen During Your Hospitalization Provider Specialty Primary office phone Zenobia Matt MD Urology [phone] Your Primary Care Physician (PCP) Primary Care Physician Office Phone Office Fax Dee Isbell [phone] You are allergic to the following Allergen Reactions Ciprofloxacin Other (comments) \"joint pain\" Recent Documentation Height Weight BMI OB Status Smoking Status 1.6 m 68.5 kg 26.75 kg/m2 Having regular periods Never Smoker Emergency Contacts Name Discharge Info Relation Home Work Mobile Jason Fiore  Spouse [3] [phone] Patient Belongings The following personal items are in your possession at time of discharge: 
  Dental Appliances: None  Visual Aid: Contacts      Home Medications: None   Jewelry: None  Clothing: Shirt, Pants    Other Valuables: Contact Lenses Please provide this summary of care documentation to your next provider. Signatures-by signing, you are acknowledging that this After Visit Summary has been reviewed with you and you have received a copy. Patient Signature:  ____________________________________________________________ Date:  ____________________________________________________________  
  
Stephie Harry  Provider Signature: ____________________________________________________________ Date:  ____________________________________________________________

## 2018-06-29 NOTE — ANESTHESIA POSTPROCEDURE EVALUATION
Post-Anesthesia Evaluation and Assessment    Patient: Narayan Gibson MRN: [id number]  SSN: [id number]    YOB: 1975  Age: 38 y.o. Sex: female       Cardiovascular Function/Vital Signs  Visit Vitals    /59 (BP 1 Location: Left arm, BP Patient Position: At rest)    Pulse 67    Temp 37.5 °C (99.5 °F)    Resp 14    Ht 5' 3\" (1.6 m)    Wt 68.5 kg (151 lb)    SpO2 99%    BMI 26.75 kg/m2       Patient is status post general anesthesia for Procedure(s):  LEFT LITHOTRIPSY EXTRACORPOREAL SHOCKWAVE ESWL. Nausea/Vomiting: None    Postoperative hydration reviewed and adequate. Pain:  Pain Scale 1: Numeric (0 - 10) (06/29/18 1214)  Pain Intensity 1: 0 (06/29/18 1214)   Managed    Neurological Status:   Neuro (WDL): Within Defined Limits (06/29/18 1214)  Neuro  Neurologic State: Alert (06/29/18 1214)   At baseline    Mental Status and Level of Consciousness: Arousable    Pulmonary Status:   O2 Device: Room air (06/29/18 1214)   Adequate oxygenation and airway patent    Complications related to anesthesia: None    Post-anesthesia assessment completed.  No concerns    Signed By: Maximiliano Harkins MD     June 29, 2018

## 2018-06-29 NOTE — OP NOTES
Operative Note                Patient: Eric Teixeira [id number]    Date of Surgery: 06/29/18    Preoperative Diagnosis: LEFT renal stones (1 LUP, 1 LMP, 2 LLP)    Postoperative Diagnosis:  same    Surgeon(s) and Role:     * Shahrzad Giron MD - Primary     Anesthesia:  General     Procedure: Procedure(s):  LEFT EXTRACORPORAL SHOCKWAVE LITHOTRIPSY (ESWL)     Indications:     Discussed the risk of surgery including infection, hematoma, bleeding, recurrence or persistence of symptoms or stone, and the risks of general anesthetic. The patient understands the risks, any and all questions were answered to the patient's satisfaction and they freely signed the consent for operation. Procedure in Detail:  Patient was taken to the lithotripsy suite. Anesthesia was induced via the anesthesiology service. Using flouroscopy for guidance, a total of 3000 shocks were delivered in a non-gaited fashion dispersed between the different stones. No cardiac ectopy was experienced. Shock rate was begun at 60 shocks per minute and then increased to 90 shocks per minute. Energy level was begun at 7 and gradually increased to a maximum of 11. Findings: Patient tolerated the procedure well. At the end of the procedure, the stones appeared to have fractured.       Estimated Blood Loss:  none    Specimens: * No specimens in log *             Drains: none                 Implants: * No implants in log *           Signed By: Umer Bates MD

## 2019-01-24 ENCOUNTER — HOSPITAL ENCOUNTER (OUTPATIENT)
Dept: CT IMAGING | Age: 44
Discharge: HOME | End: 2019-01-24
Attending: UROLOGY
Payer: MEDICAID

## 2019-01-24 DIAGNOSIS — N20.0 KIDNEY STONE: ICD-10-CM

## 2019-01-24 PROCEDURE — 74176 CT ABD & PELVIS W/O CONTRAST: CPT

## 2019-01-25 NOTE — PROGRESS NOTES
CT confirms what I told patient earlier in the week. Large R renal stone and also a L lower pole renal stone. She needs to be scheduled for [facility]NL and come in to see me for H and P visit prior. I can do orders then too. Please let patient know (she's expecting it) and tell Radha Reilly to schedule R PCNL and I will do H and P and orders at pre op H and P visit.

## 2019-02-06 ENCOUNTER — ANESTHESIA EVENT (OUTPATIENT)
Dept: SURGERY | Age: 44
End: 2019-02-06
Payer: MEDICAID

## 2019-02-06 ENCOUNTER — HOSPITAL ENCOUNTER (OUTPATIENT)
Dept: SURGERY | Age: 44
Discharge: HOME | End: 2019-02-06
Payer: MEDICAID

## 2019-02-06 LAB
ANION GAP SERPL CALC-SCNC: 5 MMOL/L (ref 7–16)
APTT PPP: 29.5 SEC (ref 24.7–39.8)
BUN SERPL-MCNC: 17 MG/DL (ref 6–23)
CALCIUM SERPL-MCNC: 9 MG/DL (ref 8.3–10.4)
CHLORIDE SERPL-SCNC: 106 MMOL/L (ref 98–107)
CO2 SERPL-SCNC: 28 MMOL/L (ref 21–32)
CREAT SERPL-MCNC: 0.87 MG/DL (ref 0.6–1)
ERYTHROCYTE [DISTWIDTH] IN BLOOD BY AUTOMATED COUNT: 11.6 % (ref 11.9–14.6)
GLUCOSE SERPL-MCNC: 90 MG/DL (ref 65–100)
HCT VFR BLD AUTO: 37.6 % (ref 35.8–46.3)
HGB BLD-MCNC: 12.8 G/DL (ref 11.7–15.4)
INR PPP: 1
MCH RBC QN AUTO: 31.1 PG (ref 26.1–32.9)
MCHC RBC AUTO-ENTMCNC: 34 G/DL (ref 31.4–35)
MCV RBC AUTO: 91.3 FL (ref 79.6–97.8)
NRBC # BLD: 0 K/UL (ref 0–0.2)
PLATELET # BLD AUTO: 248 K/UL (ref 150–450)
PMV BLD AUTO: 10.6 FL (ref 9.4–12.3)
POTASSIUM SERPL-SCNC: 3.9 MMOL/L (ref 3.5–5.1)
PROTHROMBIN TIME: 13 SEC (ref 11.7–14.5)
RBC # BLD AUTO: 4.12 M/UL (ref 4.05–5.2)
SODIUM SERPL-SCNC: 139 MMOL/L (ref 136–145)
WBC # BLD AUTO: 5.5 K/UL (ref 4.3–11.1)

## 2019-02-06 PROCEDURE — 85027 COMPLETE CBC AUTOMATED: CPT

## 2019-02-06 PROCEDURE — 85610 PROTHROMBIN TIME: CPT

## 2019-02-06 PROCEDURE — 85730 THROMBOPLASTIN TIME PARTIAL: CPT

## 2019-02-06 PROCEDURE — 80048 BASIC METABOLIC PNL TOTAL CA: CPT

## 2019-02-06 RX ORDER — PHENOL/SODIUM PHENOLATE
20 AEROSOL, SPRAY (ML) MUCOUS MEMBRANE 2 TIMES DAILY
COMMUNITY

## 2019-02-06 NOTE — PERIOP NOTES
Patient verified name and . Patient provided medical/health information and PTA medications to the best of their ability. TYPE  CASE:lb Order for consent  found in EHR and matches case posting. Labs per surgeon:CBC, BMP, PT, PTT. Labs per anesthesia protocol: None. EKG  :  None, patient denies any cardiac hx Patient provided with and instructed on education handouts including Guide to Surgery, blood transfusions, pain management, and hand hygiene for the family and community, and [facility] brochure. Antibacterial soap and Hibiclens instructions given per hospital policy. Instructed patient to continue previous medications as prescribed prior to surgery unless otherwise directed and to take the following medications the day of surgery according to anesthesia guidelines : Atenolol, Hydrocodone, Omeprazole, Bactrim DS . Instructed patient to hold  the following medications: Vitamins. Original medication prescription bottles were not visualized during patient appointment. Patient teach back successful and patient demonstrates knowledge of instruction.

## 2019-02-06 NOTE — H&P (VIEW-ONLY)
[facility] [address] [facility], [address]  
[phone] [address] : 1975 No chief complaint on file. HPI [address] is a 37 y.o. female seen in regards to nephrolithiasis, UTI's, and now 2 episodes of sepsis from obstructing ureteral stones.  The first was in .   
   
The 2nd was in  and was due to a 5 mm L ureteral stone.  Stent was placed emergently 17.  Blood culture/urine culture returned Enterococcus.  ID was involved and she is still on IV PCN.  cystoscopy, LEFT ureteroscopy, laser lithotripsy, LEFT ureteral stent exchange was performed 17.   
   
She also has had an issue with recurrent UTI's since sling placement in .   
   
She underwent cystoscopy, LEFT ureteroscopy, laser lithotripsy, LEFT ureteral stent placement by Dr. Stefanie Solomon 10/21/17. 
   
24 hour Marinus Gamma was performed in .  PTH and serum Ca were normal.  UOP was 1600 ml and citrate levels were low.  She began urocitK 10 meq bid and increased fluid intake.  Repeat 24 hour urorisk revealed UOP increase to 2.7L daily AND citrate was normalized to 567 from 248! 
   
She is now on daily bactrim per primary MD for UTI prophylaxis. 
  
She underwent L ESWL 18 and did pass fragments. KUB at visit in  revealed a new 2.5 cm R renal pelvis stone and 15 mm LLP renal stone. PTH and Calcium levels were both rechecked and normal.    
  
   
PVR: 17 0 ml 
  
 
 
 
 
 
 
Past Medical History:  
Diagnosis Date  Anxiety   
 managed with medication  Difficult intubation 16 sore throat and ulcers after intubation  Essential hypertension 2016  
 only when septic in 10/2016, takes atenolol for HR  
 GERD (gastroesophageal reflux disease) OTC prn  
 Hiatal hernia  History of kidney stones   
 numerous  Kidney stone   
 left side  Nausea & vomiting   
 nausea after anesthesia  Paroxysmal SVT (supraventricular tachycardia) (HCC)   
 managed with ablation and medication Past Surgical History:  
Procedure Laterality Date  CLOSE CYSTOSTOMY    
 for kidney stones  HX BLADDER SUSPENSION  04/2016  HX LITHOTRIPSY  HX SVT ABLATION  2003  HX TUBAL LIGATION  2006  HX UROLOGICAL  1/2017, 9/2017, 9/2017  
 cysto x 3 Current Outpatient Medications Medication Sig Dispense Refill  trimethoprim-sulfamethoxazole (BACTRIM DS) 160-800 mg per tablet Take 1 Tab by mouth two (2) times a day. 14 Tab 0  
 HYDROcodone-acetaminophen (NORCO) 5-325 mg per tablet Take 1 Tab by mouth every six (6) hours as needed for Pain. Max Daily Amount: 4 Tabs. 20 Tab 0  
 trimethoprim-sulfamethoxazole (BACTRIM DS) 160-800 mg per tablet Take 1 Tab by mouth two (2) times a day. 14 Tab 0  
 potassium citrate (UROCIT-K 10) 10 mEq (1,080 mg) TbER Take 1 Tab by mouth two (2) times a day. 180 Tab 4  Mth-Me Blue-Sod Phos-PhSal-Hyo (URIBEL) 118-10-40.8-36 mg cap capsule Take 1 Cap by mouth four (4) times daily as needed. 30 Cap 0  
 atenolol (TENORMIN) 25 mg tablet Take 12.5 mg by mouth every morning. Allergies Allergen Reactions  Ciprofloxacin Other (comments) \"joint pain\" Social History Socioeconomic History  Marital status:  Spouse name: Not on file  Number of children: Not on file  Years of education: Not on file  Highest education level: Not on file Social Needs  Financial resource strain: Not on file  Food insecurity - worry: Not on file  Food insecurity - inability: Not on file  Transportation needs - medical: Not on file  Transportation needs - non-medical: Not on file Occupational History  Not on file Tobacco Use  Smoking status: Never Smoker  Smokeless tobacco: Never Used Substance and Sexual Activity  Alcohol use: No  
 Drug use: No  
 Sexual activity: Not on file Other Topics Concern  Not on file Social History Narrative  Not on file Family History Problem Relation Age of Onset  Heart Disease Mother   
     congenital heart block with allo  Diabetes Father  Hypertension Father  Cancer Father   
     melanoma (face) ROS: no fever, chill There were no vitals taken for this visit. GENERAL: NAD, ALERT, A&O x 3, GAIT NORMAL CARDIAC: regular rate and rhythm CHEST AND LUNG: Easy work of breathing, clear to auscultation bilaterally ABDOMEN: soft, non tender, non distended, + bowel sounds, no organomegaly, no palpable masses NEUROLOGIC: cranial nerves 2-12 grossly intact Assessment and Plan ICD-10-CM ICD-9-CM 1. Renal stones N20.0 592.0 She is scheduled for R PCNL. Potential risks were discussed. She will stay on bactrim until surgery due to frequency of UTI's. We will likely pursue L ESWL a few weeks after R PCNL and eventually referral to [facility] for metabolic stone clinic.

## 2019-02-06 NOTE — PERIOP NOTES
Lab results and acceptable per anesthesia protocol Recent Results (from the past 12 hour(s)) CBC W/O DIFF Collection Time: 02/06/19  2:29 PM  
Result Value Ref Range WBC 5.5 4.3 - 11.1 K/uL  
 RBC 4.12 4.05 - 5.2 M/uL  
 HGB 12.8 11.7 - 15.4 g/dL HCT 37.6 35.8 - 46.3 % MCV 91.3 79.6 - 97.8 FL  
 MCH 31.1 26.1 - 32.9 PG  
 MCHC 34.0 31.4 - 35.0 g/dL  
 RDW 11.6 (L) 11.9 - 14.6 % PLATELET 490 671 - 629 K/uL MPV 10.6 9.4 - 12.3 FL ABSOLUTE NRBC 0.00 0.0 - 0.2 K/uL METABOLIC PANEL, BASIC Collection Time: 02/06/19  2:29 PM  
Result Value Ref Range Sodium 139 136 - 145 mmol/L Potassium 3.9 3.5 - 5.1 mmol/L Chloride 106 98 - 107 mmol/L  
 CO2 28 21 - 32 mmol/L Anion gap 5 (L) 7 - 16 mmol/L Glucose 90 65 - 100 mg/dL BUN 17 6 - 23 MG/DL Creatinine 0.87 0.6 - 1.0 MG/DL  
 GFR est AA >60 >60 ml/min/1.73m2 GFR est non-AA >60 >60 ml/min/1.73m2 Calcium 9.0 8.3 - 10.4 MG/DL  
PTT Collection Time: 02/06/19  2:29 PM  
Result Value Ref Range aPTT 29.5 24.7 - 39.8 SEC PROTHROMBIN TIME + INR Collection Time: 02/06/19  2:29 PM  
Result Value Ref Range Prothrombin time 13.0 11.7 - 14.5 sec INR 1.0

## 2019-02-13 ENCOUNTER — HOSPITAL ENCOUNTER (OUTPATIENT)
Age: 44
Setting detail: OBSERVATION
Discharge: HOME | End: 2019-02-17
Attending: UROLOGY | Admitting: UROLOGY
Payer: MEDICAID

## 2019-02-13 ENCOUNTER — HOSPITAL ENCOUNTER (OUTPATIENT)
Dept: INTERVENTIONAL RADIOLOGY/VASCULAR | Age: 44
Discharge: HOME | End: 2019-02-13
Attending: UROLOGY
Payer: MEDICAID

## 2019-02-13 ENCOUNTER — APPOINTMENT (OUTPATIENT)
Dept: GENERAL RADIOLOGY | Age: 44
End: 2019-02-13
Attending: UROLOGY
Payer: MEDICAID

## 2019-02-13 ENCOUNTER — ANESTHESIA (OUTPATIENT)
Dept: SURGERY | Age: 44
End: 2019-02-13
Payer: MEDICAID

## 2019-02-13 VITALS
HEIGHT: 63 IN | WEIGHT: 150 LBS | OXYGEN SATURATION: 100 % | HEART RATE: 105 BPM | DIASTOLIC BLOOD PRESSURE: 56 MMHG | SYSTOLIC BLOOD PRESSURE: 108 MMHG | BODY MASS INDEX: 26.58 KG/M2 | TEMPERATURE: 99.2 F | RESPIRATION RATE: 22 BRPM

## 2019-02-13 DIAGNOSIS — N20.0 KIDNEY STONE: ICD-10-CM

## 2019-02-13 DIAGNOSIS — N20.0 RIGHT NEPHROLITHIASIS: Primary | ICD-10-CM

## 2019-02-13 LAB
ABO + RH BLD: NORMAL
BLOOD GROUP ANTIBODIES SERPL: NORMAL
SPECIMEN EXP DATE BLD: NORMAL

## 2019-02-13 PROCEDURE — 74011250636 HC RX REV CODE- 250/636: Performed by: ANESTHESIOLOGY

## 2019-02-13 PROCEDURE — 77030003666 HC NDL SPINAL BD -A

## 2019-02-13 PROCEDURE — 77030005518 HC CATH URETH FOL 2W BARD -B: Performed by: UROLOGY

## 2019-02-13 PROCEDURE — C2617 STENT, NON-COR, TEM W/O DEL: HCPCS | Performed by: UROLOGY

## 2019-02-13 PROCEDURE — 77030039425 HC BLD LARYNG TRULITE DISP TELE -A: Performed by: ANESTHESIOLOGY

## 2019-02-13 PROCEDURE — 86900 BLOOD TYPING SEROLOGIC ABO: CPT

## 2019-02-13 PROCEDURE — 74011250636 HC RX REV CODE- 250/636

## 2019-02-13 PROCEDURE — 74011636320 HC RX REV CODE- 636/320: Performed by: RADIOLOGY

## 2019-02-13 PROCEDURE — 74011000258 HC RX REV CODE- 258: Performed by: UROLOGY

## 2019-02-13 PROCEDURE — 99218 HC RM OBSERVATION: CPT

## 2019-02-13 PROCEDURE — 76210000016 HC OR PH I REC 1 TO 1.5 HR: Performed by: UROLOGY

## 2019-02-13 PROCEDURE — C1769 GUIDE WIRE: HCPCS | Performed by: UROLOGY

## 2019-02-13 PROCEDURE — 50433 PLMT NEPHROURETERAL CATHETER: CPT

## 2019-02-13 PROCEDURE — 77030010545: Performed by: UROLOGY

## 2019-02-13 PROCEDURE — 82355 CALCULUS ANALYSIS QUAL: CPT

## 2019-02-13 PROCEDURE — 74011250636 HC RX REV CODE- 250/636: Performed by: UROLOGY

## 2019-02-13 PROCEDURE — 77030037088 HC TUBE ENDOTRACH ORAL NSL COVD-A: Performed by: ANESTHESIOLOGY

## 2019-02-13 PROCEDURE — 77030019908 HC STETH ESOPH SIMS -A: Performed by: ANESTHESIOLOGY

## 2019-02-13 PROCEDURE — 77030027138 HC INCENT SPIROMETER -A

## 2019-02-13 PROCEDURE — 77030018846 HC SOL IRR STRL H20 ICUM -A: Performed by: UROLOGY

## 2019-02-13 PROCEDURE — 77030013058 HC DEV INFL ANGIO BSC -B: Performed by: UROLOGY

## 2019-02-13 PROCEDURE — 77030032490 HC SLV COMPR SCD KNE COVD -B: Performed by: UROLOGY

## 2019-02-13 PROCEDURE — 74011250637 HC RX REV CODE- 250/637: Performed by: ANESTHESIOLOGY

## 2019-02-13 PROCEDURE — 77030021532 HC CATH ANGI DX IMPRS MRTM -B

## 2019-02-13 PROCEDURE — 77030019927 HC TBNG IRR CYSTO BAXT -A: Performed by: UROLOGY

## 2019-02-13 PROCEDURE — 77030020782 HC GWN BAIR PAWS FLX 3M -B: Performed by: ANESTHESIOLOGY

## 2019-02-13 PROCEDURE — 77030018836 HC SOL IRR NACL ICUM -A: Performed by: UROLOGY

## 2019-02-13 PROCEDURE — C1726 CATH, BAL DIL, NON-VASCULAR: HCPCS | Performed by: UROLOGY

## 2019-02-13 PROCEDURE — 88300 SURGICAL PATH GROSS: CPT

## 2019-02-13 PROCEDURE — 74011250636 HC RX REV CODE- 250/636: Performed by: RADIOLOGY

## 2019-02-13 PROCEDURE — C1894 INTRO/SHEATH, NON-LASER: HCPCS | Performed by: UROLOGY

## 2019-02-13 PROCEDURE — 76060000033 HC ANESTHESIA 1 TO 1.5 HR: Performed by: UROLOGY

## 2019-02-13 PROCEDURE — 77030002916 HC SUT ETHLN J&J -A: Performed by: UROLOGY

## 2019-02-13 PROCEDURE — 76010000161 HC OR TIME 1 TO 1.5 HR INTENSV-TIER 1: Performed by: UROLOGY

## 2019-02-13 PROCEDURE — 74011000250 HC RX REV CODE- 250

## 2019-02-13 PROCEDURE — 36415 COLL VENOUS BLD VENIPUNCTURE: CPT

## 2019-02-13 PROCEDURE — C1894 INTRO/SHEATH, NON-LASER: HCPCS

## 2019-02-13 PROCEDURE — 65270000029 HC RM PRIVATE

## 2019-02-13 PROCEDURE — 74011636320 HC RX REV CODE- 636/320: Performed by: UROLOGY

## 2019-02-13 PROCEDURE — 77030030799 HC PRB LITHO OCOA -F: Performed by: UROLOGY

## 2019-02-13 PROCEDURE — 74011250637 HC RX REV CODE- 250/637: Performed by: UROLOGY

## 2019-02-13 RX ORDER — SODIUM CHLORIDE 0.9 % (FLUSH) 0.9 %
5-40 SYRINGE (ML) INJECTION AS NEEDED
Status: DISCONTINUED | OUTPATIENT
Start: 2019-02-13 | End: 2019-02-17 | Stop reason: HOSPADM

## 2019-02-13 RX ORDER — ONDANSETRON 2 MG/ML
INJECTION INTRAMUSCULAR; INTRAVENOUS AS NEEDED
Status: DISCONTINUED | OUTPATIENT
Start: 2019-02-13 | End: 2019-02-13 | Stop reason: HOSPADM

## 2019-02-13 RX ORDER — SODIUM CHLORIDE 0.9 % (FLUSH) 0.9 %
5-40 SYRINGE (ML) INJECTION EVERY 8 HOURS
Status: DISCONTINUED | OUTPATIENT
Start: 2019-02-13 | End: 2019-02-17 | Stop reason: HOSPADM

## 2019-02-13 RX ORDER — FENTANYL CITRATE 50 UG/ML
100 INJECTION, SOLUTION INTRAMUSCULAR; INTRAVENOUS AS NEEDED
Status: DISCONTINUED | OUTPATIENT
Start: 2019-02-13 | End: 2019-02-13 | Stop reason: SDUPTHER

## 2019-02-13 RX ORDER — SODIUM CHLORIDE, SODIUM LACTATE, POTASSIUM CHLORIDE, CALCIUM CHLORIDE 600; 310; 30; 20 MG/100ML; MG/100ML; MG/100ML; MG/100ML
1000 INJECTION, SOLUTION INTRAVENOUS CONTINUOUS
Status: DISCONTINUED | OUTPATIENT
Start: 2019-02-13 | End: 2019-02-13 | Stop reason: HOSPADM

## 2019-02-13 RX ORDER — LIDOCAINE HYDROCHLORIDE 10 MG/ML
0.1 INJECTION INFILTRATION; PERINEURAL AS NEEDED
Status: DISCONTINUED | OUTPATIENT
Start: 2019-02-13 | End: 2019-02-13 | Stop reason: SDUPTHER

## 2019-02-13 RX ORDER — MIDAZOLAM HYDROCHLORIDE 1 MG/ML
.5-2 INJECTION, SOLUTION INTRAMUSCULAR; INTRAVENOUS
Status: DISCONTINUED | OUTPATIENT
Start: 2019-02-13 | End: 2019-02-13 | Stop reason: ALTCHOICE

## 2019-02-13 RX ORDER — OXYCODONE HYDROCHLORIDE 5 MG/1
5 TABLET ORAL
Status: DISCONTINUED | OUTPATIENT
Start: 2019-02-13 | End: 2019-02-13 | Stop reason: SDUPTHER

## 2019-02-13 RX ORDER — FENTANYL CITRATE 50 UG/ML
100 INJECTION, SOLUTION INTRAMUSCULAR; INTRAVENOUS AS NEEDED
Status: DISCONTINUED | OUTPATIENT
Start: 2019-02-13 | End: 2019-02-13 | Stop reason: HOSPADM

## 2019-02-13 RX ORDER — OXYBUTYNIN CHLORIDE 5 MG/1
5 TABLET ORAL
Status: DISCONTINUED | OUTPATIENT
Start: 2019-02-13 | End: 2019-02-17 | Stop reason: HOSPADM

## 2019-02-13 RX ORDER — LIDOCAINE HYDROCHLORIDE 10 MG/ML
0.1 INJECTION INFILTRATION; PERINEURAL AS NEEDED
Status: DISCONTINUED | OUTPATIENT
Start: 2019-02-13 | End: 2019-02-13 | Stop reason: HOSPADM

## 2019-02-13 RX ORDER — CEFAZOLIN SODIUM/WATER 2 G/20 ML
2 SYRINGE (ML) INTRAVENOUS
Status: COMPLETED | OUTPATIENT
Start: 2019-02-13 | End: 2019-02-13

## 2019-02-13 RX ORDER — LIDOCAINE HYDROCHLORIDE 20 MG/ML
INJECTION, SOLUTION EPIDURAL; INFILTRATION; INTRACAUDAL; PERINEURAL AS NEEDED
Status: DISCONTINUED | OUTPATIENT
Start: 2019-02-13 | End: 2019-02-13 | Stop reason: HOSPADM

## 2019-02-13 RX ORDER — NALOXONE HYDROCHLORIDE 0.4 MG/ML
0.1 INJECTION, SOLUTION INTRAMUSCULAR; INTRAVENOUS; SUBCUTANEOUS AS NEEDED
Status: DISCONTINUED | OUTPATIENT
Start: 2019-02-13 | End: 2019-02-13 | Stop reason: SDUPTHER

## 2019-02-13 RX ORDER — DEXAMETHASONE SODIUM PHOSPHATE 4 MG/ML
INJECTION, SOLUTION INTRA-ARTICULAR; INTRALESIONAL; INTRAMUSCULAR; INTRAVENOUS; SOFT TISSUE AS NEEDED
Status: DISCONTINUED | OUTPATIENT
Start: 2019-02-13 | End: 2019-02-13 | Stop reason: HOSPADM

## 2019-02-13 RX ORDER — DEXTROSE MONOHYDRATE AND SODIUM CHLORIDE 5; .45 G/100ML; G/100ML
75 INJECTION, SOLUTION INTRAVENOUS CONTINUOUS
Status: DISCONTINUED | OUTPATIENT
Start: 2019-02-13 | End: 2019-02-14

## 2019-02-13 RX ORDER — NEOSTIGMINE METHYLSULFATE 1 MG/ML
INJECTION INTRAVENOUS AS NEEDED
Status: DISCONTINUED | OUTPATIENT
Start: 2019-02-13 | End: 2019-02-13 | Stop reason: HOSPADM

## 2019-02-13 RX ORDER — MIDAZOLAM HYDROCHLORIDE 1 MG/ML
2 INJECTION, SOLUTION INTRAMUSCULAR; INTRAVENOUS
Status: DISCONTINUED | OUTPATIENT
Start: 2019-02-13 | End: 2019-02-13 | Stop reason: SDUPTHER

## 2019-02-13 RX ORDER — FENTANYL CITRATE 50 UG/ML
25-50 INJECTION, SOLUTION INTRAMUSCULAR; INTRAVENOUS
Status: DISCONTINUED | OUTPATIENT
Start: 2019-02-13 | End: 2019-02-13 | Stop reason: ALTCHOICE

## 2019-02-13 RX ORDER — DOCUSATE SODIUM 100 MG/1
100 CAPSULE, LIQUID FILLED ORAL 2 TIMES DAILY
Status: DISCONTINUED | OUTPATIENT
Start: 2019-02-14 | End: 2019-02-17 | Stop reason: HOSPADM

## 2019-02-13 RX ORDER — HYDROMORPHONE HYDROCHLORIDE 1 MG/ML
1 INJECTION, SOLUTION INTRAMUSCULAR; INTRAVENOUS; SUBCUTANEOUS
Status: DISCONTINUED | OUTPATIENT
Start: 2019-02-13 | End: 2019-02-17 | Stop reason: HOSPADM

## 2019-02-13 RX ORDER — MIDAZOLAM HYDROCHLORIDE 1 MG/ML
2 INJECTION, SOLUTION INTRAMUSCULAR; INTRAVENOUS
Status: DISCONTINUED | OUTPATIENT
Start: 2019-02-13 | End: 2019-02-13 | Stop reason: HOSPADM

## 2019-02-13 RX ORDER — LIDOCAINE HYDROCHLORIDE 20 MG/ML
20-200 INJECTION, SOLUTION INFILTRATION; PERINEURAL ONCE
Status: COMPLETED | OUTPATIENT
Start: 2019-02-13 | End: 2019-02-13

## 2019-02-13 RX ORDER — NALOXONE HYDROCHLORIDE 0.4 MG/ML
0.1 INJECTION, SOLUTION INTRAMUSCULAR; INTRAVENOUS; SUBCUTANEOUS AS NEEDED
Status: DISCONTINUED | OUTPATIENT
Start: 2019-02-13 | End: 2019-02-13 | Stop reason: HOSPADM

## 2019-02-13 RX ORDER — ONDANSETRON 2 MG/ML
4 INJECTION INTRAMUSCULAR; INTRAVENOUS ONCE
Status: DISCONTINUED | OUTPATIENT
Start: 2019-02-13 | End: 2019-02-13 | Stop reason: HOSPADM

## 2019-02-13 RX ORDER — PANTOPRAZOLE SODIUM 40 MG/1
40 TABLET, DELAYED RELEASE ORAL
Status: DISCONTINUED | OUTPATIENT
Start: 2019-02-14 | End: 2019-02-17 | Stop reason: HOSPADM

## 2019-02-13 RX ORDER — OXYCODONE HYDROCHLORIDE 5 MG/1
10 TABLET ORAL
Status: DISCONTINUED | OUTPATIENT
Start: 2019-02-13 | End: 2019-02-13 | Stop reason: SDUPTHER

## 2019-02-13 RX ORDER — HYDROMORPHONE HYDROCHLORIDE 2 MG/ML
0.5 INJECTION, SOLUTION INTRAMUSCULAR; INTRAVENOUS; SUBCUTANEOUS
Status: DISCONTINUED | OUTPATIENT
Start: 2019-02-13 | End: 2019-02-13 | Stop reason: SDUPTHER

## 2019-02-13 RX ORDER — ROCURONIUM BROMIDE 10 MG/ML
INJECTION, SOLUTION INTRAVENOUS AS NEEDED
Status: DISCONTINUED | OUTPATIENT
Start: 2019-02-13 | End: 2019-02-13 | Stop reason: HOSPADM

## 2019-02-13 RX ORDER — CEFAZOLIN SODIUM/WATER 2 G/20 ML
2 SYRINGE (ML) INTRAVENOUS
Status: DISCONTINUED | OUTPATIENT
Start: 2019-02-13 | End: 2019-02-13 | Stop reason: HOSPADM

## 2019-02-13 RX ORDER — DIPHENHYDRAMINE HYDROCHLORIDE 50 MG/ML
25-50 INJECTION, SOLUTION INTRAMUSCULAR; INTRAVENOUS ONCE
Status: COMPLETED | OUTPATIENT
Start: 2019-02-13 | End: 2019-02-13

## 2019-02-13 RX ORDER — SODIUM CHLORIDE, SODIUM LACTATE, POTASSIUM CHLORIDE, CALCIUM CHLORIDE 600; 310; 30; 20 MG/100ML; MG/100ML; MG/100ML; MG/100ML
75 INJECTION, SOLUTION INTRAVENOUS CONTINUOUS
Status: DISCONTINUED | OUTPATIENT
Start: 2019-02-13 | End: 2019-02-13 | Stop reason: HOSPADM

## 2019-02-13 RX ORDER — HYDROMORPHONE HYDROCHLORIDE 2 MG/ML
0.5 INJECTION, SOLUTION INTRAMUSCULAR; INTRAVENOUS; SUBCUTANEOUS
Status: DISCONTINUED | OUTPATIENT
Start: 2019-02-13 | End: 2019-02-13 | Stop reason: HOSPADM

## 2019-02-13 RX ORDER — ATENOLOL 25 MG/1
12.5 TABLET ORAL
Status: DISCONTINUED | OUTPATIENT
Start: 2019-02-14 | End: 2019-02-17 | Stop reason: HOSPADM

## 2019-02-13 RX ORDER — FENTANYL CITRATE 50 UG/ML
INJECTION, SOLUTION INTRAMUSCULAR; INTRAVENOUS AS NEEDED
Status: DISCONTINUED | OUTPATIENT
Start: 2019-02-13 | End: 2019-02-13 | Stop reason: HOSPADM

## 2019-02-13 RX ORDER — ACETAMINOPHEN 500 MG
500 TABLET ORAL ONCE
Status: DISCONTINUED | OUTPATIENT
Start: 2019-02-13 | End: 2019-02-13 | Stop reason: SDUPTHER

## 2019-02-13 RX ORDER — ONDANSETRON 2 MG/ML
4 INJECTION INTRAMUSCULAR; INTRAVENOUS ONCE
Status: DISCONTINUED | OUTPATIENT
Start: 2019-02-13 | End: 2019-02-13 | Stop reason: SDUPTHER

## 2019-02-13 RX ORDER — SODIUM CHLORIDE, SODIUM LACTATE, POTASSIUM CHLORIDE, CALCIUM CHLORIDE 600; 310; 30; 20 MG/100ML; MG/100ML; MG/100ML; MG/100ML
1000 INJECTION, SOLUTION INTRAVENOUS CONTINUOUS
Status: DISCONTINUED | OUTPATIENT
Start: 2019-02-13 | End: 2019-02-13 | Stop reason: SDUPTHER

## 2019-02-13 RX ORDER — DIPHENHYDRAMINE HYDROCHLORIDE 50 MG/ML
12.5 INJECTION, SOLUTION INTRAMUSCULAR; INTRAVENOUS ONCE
Status: DISCONTINUED | OUTPATIENT
Start: 2019-02-13 | End: 2019-02-13 | Stop reason: HOSPADM

## 2019-02-13 RX ORDER — SODIUM CHLORIDE, SODIUM LACTATE, POTASSIUM CHLORIDE, CALCIUM CHLORIDE 600; 310; 30; 20 MG/100ML; MG/100ML; MG/100ML; MG/100ML
75 INJECTION, SOLUTION INTRAVENOUS CONTINUOUS
Status: DISCONTINUED | OUTPATIENT
Start: 2019-02-13 | End: 2019-02-13 | Stop reason: SDUPTHER

## 2019-02-13 RX ORDER — OXYCODONE HYDROCHLORIDE 5 MG/1
5 TABLET ORAL
Status: DISCONTINUED | OUTPATIENT
Start: 2019-02-13 | End: 2019-02-13 | Stop reason: HOSPADM

## 2019-02-13 RX ORDER — OXYCODONE HYDROCHLORIDE 5 MG/1
10 TABLET ORAL
Status: COMPLETED | OUTPATIENT
Start: 2019-02-13 | End: 2019-02-13

## 2019-02-13 RX ORDER — PROPOFOL 10 MG/ML
INJECTION, EMULSION INTRAVENOUS AS NEEDED
Status: DISCONTINUED | OUTPATIENT
Start: 2019-02-13 | End: 2019-02-13 | Stop reason: HOSPADM

## 2019-02-13 RX ORDER — SODIUM CHLORIDE 9 MG/ML
500 INJECTION, SOLUTION INTRAVENOUS CONTINUOUS
Status: DISCONTINUED | OUTPATIENT
Start: 2019-02-13 | End: 2019-02-13 | Stop reason: ALTCHOICE

## 2019-02-13 RX ORDER — ONDANSETRON 2 MG/ML
4 INJECTION INTRAMUSCULAR; INTRAVENOUS
Status: DISCONTINUED | OUTPATIENT
Start: 2019-02-13 | End: 2019-02-17 | Stop reason: HOSPADM

## 2019-02-13 RX ORDER — ACETAMINOPHEN 325 MG/1
650 TABLET ORAL
Status: DISCONTINUED | OUTPATIENT
Start: 2019-02-13 | End: 2019-02-17 | Stop reason: HOSPADM

## 2019-02-13 RX ORDER — SCOLOPAMINE TRANSDERMAL SYSTEM 1 MG/1
1 PATCH, EXTENDED RELEASE TRANSDERMAL ONCE
Status: COMPLETED | OUTPATIENT
Start: 2019-02-13 | End: 2019-02-16

## 2019-02-13 RX ORDER — GLYCOPYRROLATE 0.2 MG/ML
INJECTION INTRAMUSCULAR; INTRAVENOUS AS NEEDED
Status: DISCONTINUED | OUTPATIENT
Start: 2019-02-13 | End: 2019-02-13 | Stop reason: HOSPADM

## 2019-02-13 RX ORDER — HYDROCODONE BITARTRATE AND ACETAMINOPHEN 7.5; 325 MG/1; MG/1
1 TABLET ORAL
Status: DISCONTINUED | OUTPATIENT
Start: 2019-02-13 | End: 2019-02-17 | Stop reason: HOSPADM

## 2019-02-13 RX ORDER — DIPHENHYDRAMINE HYDROCHLORIDE 50 MG/ML
12.5 INJECTION, SOLUTION INTRAMUSCULAR; INTRAVENOUS ONCE
Status: DISCONTINUED | OUTPATIENT
Start: 2019-02-13 | End: 2019-02-13 | Stop reason: SDUPTHER

## 2019-02-13 RX ORDER — ACETAMINOPHEN 500 MG
500 TABLET ORAL ONCE
Status: DISCONTINUED | OUTPATIENT
Start: 2019-02-13 | End: 2019-02-13 | Stop reason: HOSPADM

## 2019-02-13 RX ADMIN — SODIUM CHLORIDE, SODIUM LACTATE, POTASSIUM CHLORIDE, AND CALCIUM CHLORIDE: 600; 310; 30; 20 INJECTION, SOLUTION INTRAVENOUS at 10:30

## 2019-02-13 RX ADMIN — DIPHENHYDRAMINE HYDROCHLORIDE 50 MG: 50 INJECTION, SOLUTION INTRAMUSCULAR; INTRAVENOUS at 08:25

## 2019-02-13 RX ADMIN — FENTANYL CITRATE 25 MCG: 50 INJECTION, SOLUTION INTRAMUSCULAR; INTRAVENOUS at 11:02

## 2019-02-13 RX ADMIN — GLYCOPYRROLATE 0.4 MG: 0.2 INJECTION INTRAMUSCULAR; INTRAVENOUS at 10:48

## 2019-02-13 RX ADMIN — LIDOCAINE HYDROCHLORIDE 120 MG: 20 INJECTION, SOLUTION INFILTRATION; PERINEURAL at 08:36

## 2019-02-13 RX ADMIN — HYDROMORPHONE HYDROCHLORIDE 0.5 MG: 2 INJECTION, SOLUTION INTRAMUSCULAR; INTRAVENOUS; SUBCUTANEOUS at 11:22

## 2019-02-13 RX ADMIN — NEOSTIGMINE METHYLSULFATE 3 MG: 1 INJECTION INTRAVENOUS at 10:48

## 2019-02-13 RX ADMIN — Medication 10 ML: at 21:12

## 2019-02-13 RX ADMIN — SODIUM CHLORIDE 500 ML: 900 INJECTION, SOLUTION INTRAVENOUS at 08:25

## 2019-02-13 RX ADMIN — FENTANYL CITRATE 50 MCG: 50 INJECTION, SOLUTION INTRAMUSCULAR; INTRAVENOUS at 08:26

## 2019-02-13 RX ADMIN — OXYBUTYNIN CHLORIDE 5 MG: 5 TABLET ORAL at 12:55

## 2019-02-13 RX ADMIN — MIDAZOLAM HYDROCHLORIDE 1 MG: 1 INJECTION, SOLUTION INTRAMUSCULAR; INTRAVENOUS at 08:26

## 2019-02-13 RX ADMIN — Medication 2 G: at 08:15

## 2019-02-13 RX ADMIN — HYDROMORPHONE HYDROCHLORIDE 0.5 MG: 2 INJECTION, SOLUTION INTRAMUSCULAR; INTRAVENOUS; SUBCUTANEOUS at 12:55

## 2019-02-13 RX ADMIN — FENTANYL CITRATE 50 MCG: 50 INJECTION, SOLUTION INTRAMUSCULAR; INTRAVENOUS at 08:36

## 2019-02-13 RX ADMIN — ONDANSETRON 4 MG: 2 INJECTION INTRAMUSCULAR; INTRAVENOUS at 10:40

## 2019-02-13 RX ADMIN — IOPAMIDOL 8 ML: 612 INJECTION, SOLUTION INTRAVENOUS at 08:46

## 2019-02-13 RX ADMIN — FENTANYL CITRATE 25 MCG: 50 INJECTION, SOLUTION INTRAMUSCULAR; INTRAVENOUS at 11:00

## 2019-02-13 RX ADMIN — DEXTROSE MONOHYDRATE AND SODIUM CHLORIDE 75 ML/HR: 5; .45 INJECTION, SOLUTION INTRAVENOUS at 18:00

## 2019-02-13 RX ADMIN — SODIUM CHLORIDE, SODIUM LACTATE, POTASSIUM CHLORIDE, AND CALCIUM CHLORIDE 1000 ML: 600; 310; 30; 20 INJECTION, SOLUTION INTRAVENOUS at 06:58

## 2019-02-13 RX ADMIN — DEXAMETHASONE SODIUM PHOSPHATE 4 MG: 4 INJECTION, SOLUTION INTRA-ARTICULAR; INTRALESIONAL; INTRAMUSCULAR; INTRAVENOUS; SOFT TISSUE at 09:56

## 2019-02-13 RX ADMIN — OXYCODONE HYDROCHLORIDE 10 MG: 5 TABLET ORAL at 11:27

## 2019-02-13 RX ADMIN — FENTANYL CITRATE 50 MCG: 50 INJECTION, SOLUTION INTRAMUSCULAR; INTRAVENOUS at 09:45

## 2019-02-13 RX ADMIN — HYDROMORPHONE HYDROCHLORIDE 1 MG: 1 INJECTION, SOLUTION INTRAMUSCULAR; INTRAVENOUS; SUBCUTANEOUS at 18:12

## 2019-02-13 RX ADMIN — LIDOCAINE HYDROCHLORIDE 60 MG: 20 INJECTION, SOLUTION EPIDURAL; INFILTRATION; INTRACAUDAL; PERINEURAL at 09:45

## 2019-02-13 RX ADMIN — ROCURONIUM BROMIDE 40 MG: 10 INJECTION, SOLUTION INTRAVENOUS at 09:45

## 2019-02-13 RX ADMIN — MIDAZOLAM HYDROCHLORIDE 1 MG: 1 INJECTION, SOLUTION INTRAMUSCULAR; INTRAVENOUS at 08:37

## 2019-02-13 RX ADMIN — PROPOFOL 200 MG: 10 INJECTION, EMULSION INTRAVENOUS at 09:45

## 2019-02-13 RX ADMIN — HYDROMORPHONE HYDROCHLORIDE 0.5 MG: 2 INJECTION, SOLUTION INTRAMUSCULAR; INTRAVENOUS; SUBCUTANEOUS at 11:15

## 2019-02-13 NOTE — BRIEF OP NOTE
BRIEF OPERATIVE NOTE Date of Procedure: 2/13/2019 Preoperative Diagnosis: right Kidney stone > 2 cm Postoperative Diagnosis: same Procedure(s): RIGHT NEPHROLITHOTOMY PERCUTANEOUS, right antegrade nephrostogram with interpretation Surgeon(s) and Role: Noa Michelle MD - Primary Surgical Assistant: none Surgical Staff: 
Circ-1: Roger Looney RN 
Circ-2: Jeanette Hays RN Scrub Tech-1: Martin Del Castillo Event Time In Time Out Incision Start 1019 Incision Close 1051 Anesthesia: General  
Estimated Blood Loss: 20 ml Specimens:  
ID Type Source Tests Collected by Time Destination 1 : KIDNEY STONE Fresh Kidney  Bharati Barrios MD 2/13/2019 1058 Pathology Findings: see dictation Complications: none apparent Implants:  
Implant Name Type Inv. Item Serial No.  Lot No. LRB No. Used Action STENT URET 6F 24CM -- [address] [facility] [id number] - [id number]  STENT URET 6F 24CM -- [address] [id number] Right 1 Implanted

## 2019-02-13 NOTE — PROGRESS NOTES
TRANSFER - IN REPORT: 
 
Verbal report received from  DEE Bruner on [address]  being received from [facility] routine post - op Report consisted of patients Situation, Background, Assessment and  
Recommendations(SBAR). Information from the following report(s) SBAR, OR Summary, Procedure Summary and Recent Results was reviewed with the receiving nurse. Opportunity for questions and clarification was provided. Assessment completed upon patients arrival to unit and care assumed.

## 2019-02-13 NOTE — ROUTINE PROCESS
TRANSFER - OUT REPORT:    Verbal report given to Jamari Alvarez on [address]  being transferred to IR Recovery for routine post - op       Report consisted of patients Situation, Background, Assessment and   Recommendations(SBAR). Information from the following report(s) SBAR, Procedure Summary and MAR was reviewed with the receiving nurse. Opportunity for questions and clarification was provided. Conscious Sedation:   100 Mcg of Fentanyl administered  2 Mg of Versed administered  50 Mg of benadryl   Pt tolerated procedure well. Visit Vitals  /71   Pulse (!) 105   Temp 99.2 °F (37.3 °C)   Resp 20   Ht 5' 3\" (1.6 m)   Wt 68 kg (150 lb)   SpO2 100%   Breastfeeding?  No   BMI 26.57 kg/m²     Past Medical History:   Diagnosis Date    Anxiety     managed with medication     Difficult intubation     12/21/16 sore throat and ulcers after intubation     Essential hypertension 11/04/2016    only when septic in 10/2016, takes atenolol for HR    GERD (gastroesophageal reflux disease)     OTC prn    Hiatal hernia     History of kidney stones     numerous    Kidney stone     left side    Nausea & vomiting     nausea after anesthesia    Paroxysmal SVT (supraventricular tachycardia) ([address]     managed with ablation and medication      Peripheral IV 02/13/19 Left Hand (Active)   Site Assessment Clean, dry, & intact 2/13/2019  7:07 AM   Phlebitis Assessment 0 2/13/2019  7:07 AM   Infiltration Assessment 0 2/13/2019  7:07 AM   Dressing Status Clean, dry, & intact 2/13/2019  7:07 AM   Dressing Type Transparent;Tape 2/13/2019  7:07 AM   Hub Color/Line Status Infusing 2/13/2019  7:07 AM                 Nephroureteral Drain 02/13/19 Right Ureter (Active)

## 2019-02-13 NOTE — ANESTHESIA POSTPROCEDURE EVALUATION
Procedure(s): RIGHT NEPHROLITHOTOMY PERCUTANEOUS  TUBE PLACEMENT. Anesthesia Post Evaluation Multimodal analgesia: multimodal analgesia used between 6 hours prior to anesthesia start to PACU discharge Patient location during evaluation: bedside Patient participation: complete - patient participated Level of consciousness: responsive to verbal stimuli Pain management: adequate Airway patency: patent Anesthetic complications: no 
Cardiovascular status: hemodynamically stable Respiratory status: spontaneous ventilation Hydration status: stable Visit Vitals /57 Pulse 70 Temp 37 °C (98.6 °F) Resp 16 Ht 5' 3\" (1.6 m) Wt 72.1 kg (159 lb) SpO2 100% BMI 28.17 kg/m²

## 2019-02-13 NOTE — PERIOP NOTES
Patient's mother at bedside for brief visit. Pt stable. Reports pain has lessened 2/10 \"soreness\" to right flank.

## 2019-02-13 NOTE — PROGRESS NOTES
Dual skin assessment is complete with Liane Naqvi RN. Dressing noted to right flank with nephrostomy tube intact.  All other skin is CDI

## 2019-02-13 NOTE — H&P
History and Physical    Patient: Marline Osorio MRN: [id number]  SSN: [id number]    YOB: 1975  Age: 37 y.o. Sex: female      Subjective:      Mraline Osorio is a 37 y.o. female who has renal stones, with large stone in right. Presents for preop PCNL. Past Medical History:   Diagnosis Date    Anxiety     managed with medication     Difficult intubation     12/21/16 sore throat and ulcers after intubation     Essential hypertension 11/04/2016    only when septic in 10/2016, takes atenolol for HR    GERD (gastroesophageal reflux disease)     OTC prn    Hiatal hernia     History of kidney stones     numerous    Kidney stone     left side    Nausea & vomiting     nausea after anesthesia    Paroxysmal SVT (supraventricular tachycardia) ([address]     managed with ablation and medication      Past Surgical History:   Procedure Laterality Date    CARDIAC SURG PROCEDURE UNLIST      SVT ablation    CLOSE CYSTOSTOMY      for kidney stones    HX BLADDER SUSPENSION  04/2016    HX LITHOTRIPSY      HX SVT ABLATION  2003    HX TUBAL LIGATION  2006    HX UROLOGICAL  1/2017, 9/2017, 9/2017    cysto x 3       Family History   Problem Relation Age of Onset    Heart Disease Mother         congenital heart block with lalo    Diabetes Father     Hypertension Father     Cancer Father         melanoma (face)      Social History     Tobacco Use    Smoking status: Never Smoker    Smokeless tobacco: Never Used   Substance Use Topics    Alcohol use: No      Prior to Admission medications    Medication Sig Start Date End Date Taking? Authorizing Provider   Omeprazole delayed release (PRILOSEC D/R) 20 mg tablet Take 20 mg by mouth two (2) times a day. Yes Provider, Historical   trimethoprim-sulfamethoxazole (BACTRIM DS) 160-800 mg per tablet Take 1 Tab by mouth two (2) times a day.  1/28/19  Yes Marsha Webb MD   potassium citrate (UROCIT-K 10) 10 mEq (1,080 mg) TbER Take 1 Tab by mouth two (2) times a day. 2/19/18  Yes Dorene Ocampo MD   atenolol (TENORMIN) 25 mg tablet Take 12.5 mg by mouth every morning. Yes Provider, Historical   HYDROcodone-acetaminophen (NORCO) 5-325 mg per tablet Take 1 Tab by mouth every six (6) hours as needed for Pain. Max Daily Amount: 4 Tabs. 1/23/19   Dorene Ocampo MD        Allergies   Allergen Reactions    Ciprofloxacin Other (comments)     \"joint pain\"       Review of Systems:  Pertinent items are noted in the History of Present Illness. Objective:     Vitals:    02/13/19 0740 02/13/19 0741   BP:  113/59   Pulse:  92   Resp:  18   Temp:  99.2 °F (37.3 °C)   SpO2:  100%   Weight: 68 kg (150 lb)    Height: 5' 3\" (1.6 m)         Physical Exam:  LUNG: clear to auscultation bilaterally rrr    Assessment:     Hospital Problems  Date Reviewed: 2/6/2019    None          Plan:     Image guided access for PCNL.   Conscious sedation    Signed By: Gabriella Davis MD     February 13, 2019

## 2019-02-13 NOTE — INTERVAL H&P NOTE
H&P Update: 
Rosas Young was seen and examined. History and physical has been reviewed. The patient has been examined.  There have been no significant clinical changes since the completion of the originally dated History and Physical. 
 
Signed By: Heather Aguero MD   
 February 13, 2019 7:04 AM

## 2019-02-13 NOTE — ROUTINE PROCESS
TRANSFER - OUT REPORT:    Verbal report given to anju Rn on [address]  being transferred to pre op 4  for routine post - op       Report consisted of patients Situation, Background, Assessment and   Recommendations(SBAR). Information from the following report(s) SBAR, Procedure Summary and MAR was reviewed with the receiving nurse. Opportunity for questions and clarification was provided. Conscious Sedation:   100 Mcg of Fentanyl administered  2 Mg of Versed administered  50 Mg of benadryl adminstered. Pt tolerated procedure well. Transported to pre op post conscious sedation recovery period completion   2 Gms ancef IVPB   Visit Vitals  /56   Pulse (!) 105   Temp 99.2 °F (37.3 °C)   Resp 22   Ht 5' 3\" (1.6 m)   Wt 68 kg (150 lb)   SpO2 100%   Breastfeeding?  No   BMI 26.57 kg/m²     Past Medical History:   Diagnosis Date    Anxiety     managed with medication     Difficult intubation     12/21/16 sore throat and ulcers after intubation     Essential hypertension 11/04/2016    only when septic in 10/2016, takes atenolol for HR    GERD (gastroesophageal reflux disease)     OTC prn    Hiatal hernia     History of kidney stones     numerous    Kidney stone     left side    Nausea & vomiting     nausea after anesthesia    Paroxysmal SVT (supraventricular tachycardia) ([address]     managed with ablation and medication      Peripheral IV 02/13/19 Left Hand (Active)   Site Assessment Clean, dry, & intact 2/13/2019  7:07 AM   Phlebitis Assessment 0 2/13/2019  7:07 AM   Infiltration Assessment 0 2/13/2019  7:07 AM   Dressing Status Clean, dry, & intact 2/13/2019  7:07 AM   Dressing Type Transparent;Tape 2/13/2019  7:07 AM   Hub Color/Line Status Infusing 2/13/2019  7:07 AM                 Nephroureteral Drain 02/13/19 Right Ureter (Active)

## 2019-02-13 NOTE — PROCEDURES
Department of Interventional Radiology  [phone]        Interventional Radiology Brief Procedure Note    Patient: Rico Hassan MRN: [id number]  SSN: [id number]    YOB: 1975  Age: 37 y.o. Sex: female      Date of Procedure: 2/13/2019    Pre-Procedure Diagnosis: renal stones    Post-Procedure Diagnosis: SAME    Procedure(s): Percutaneous Nephrostomy Tube Placement    Brief Description of Procedure: Image guided access for PCNL    Performed By: Alonso Irvin MD     Assistants: None    Anesthesia:Moderate Sedation    Estimated Blood Loss: Less than 10ml    Specimens:  None    Implants:  Nephro-Ureteral Drain    Findings: large right sided stone.   5 french catheter to the bladder    Complications: None    Recommendations: to OR     Follow Up: as needed    Signed By: Alonso Irvin MD     February 13, 2019

## 2019-02-13 NOTE — PERIOP NOTES
Family updated on plan of care with understanding verbalized. Patient stable. No acute distress or changes noted.

## 2019-02-13 NOTE — PERIOP NOTES
TRANSFER - OUT REPORT: 
 
Verbal report given to  Gerardo Pace RN (name) on [address]  being transferred to [facility] (unit) for routine post - op Report consisted of patients Situation, Background, Assessment and  
Recommendations(SBAR). Information from the following report(s) OR Summary, Procedure Summary, Intake/Output, MAR and Cardiac Rhythm NSR was reviewed with the receiving nurse. Lines:  
Peripheral IV 02/13/19 Left Hand (Active) Site Assessment Clean, dry, & intact 2/13/2019  4:03 PM  
Phlebitis Assessment 0 2/13/2019  4:03 PM  
Infiltration Assessment 0 2/13/2019  4:03 PM  
Dressing Status Clean, dry, & intact 2/13/2019  4:03 PM  
Dressing Type Tape;Transparent 2/13/2019  4:03 PM  
Hub Color/Line Status Green; Infusing 2/13/2019  4:03 PM  
Alcohol Cap Used No 2/13/2019 11:06 AM  
  
 
Opportunity for questions and clarification was provided. Patient transported with: 
 O2 @ 2 liters VTE prophylaxis orders have been written for Severo Fiore. Patient and family given floor number and nurses name. Family updated re: pt status after security code verified.

## 2019-02-14 ENCOUNTER — APPOINTMENT (OUTPATIENT)
Dept: CT IMAGING | Age: 44
End: 2019-02-14
Attending: UROLOGY
Payer: MEDICAID

## 2019-02-14 LAB
HCT VFR BLD AUTO: 35.5 % (ref 35.8–46.3)
HGB BLD-MCNC: 11.7 G/DL (ref 11.7–15.4)

## 2019-02-14 PROCEDURE — 85018 HEMOGLOBIN: CPT

## 2019-02-14 PROCEDURE — 74011250636 HC RX REV CODE- 250/636: Performed by: UROLOGY

## 2019-02-14 PROCEDURE — 65270000029 HC RM PRIVATE

## 2019-02-14 PROCEDURE — 99218 HC RM OBSERVATION: CPT

## 2019-02-14 PROCEDURE — 77030020253 HC SOL INJ D545NS .05 DEX .45 SAL

## 2019-02-14 PROCEDURE — 74176 CT ABD & PELVIS W/O CONTRAST: CPT

## 2019-02-14 PROCEDURE — 36415 COLL VENOUS BLD VENIPUNCTURE: CPT

## 2019-02-14 PROCEDURE — 74011250637 HC RX REV CODE- 250/637: Performed by: UROLOGY

## 2019-02-14 RX ADMIN — HYDROMORPHONE HYDROCHLORIDE 1 MG: 1 INJECTION, SOLUTION INTRAMUSCULAR; INTRAVENOUS; SUBCUTANEOUS at 15:53

## 2019-02-14 RX ADMIN — HYDROCODONE BITARTRATE AND ACETAMINOPHEN 1 TABLET: 7.5; 325 TABLET ORAL at 14:36

## 2019-02-14 RX ADMIN — DOCUSATE SODIUM 100 MG: 100 CAPSULE, LIQUID FILLED ORAL at 17:49

## 2019-02-14 RX ADMIN — Medication 5 ML: at 06:13

## 2019-02-14 RX ADMIN — HYDROMORPHONE HYDROCHLORIDE 1 MG: 1 INJECTION, SOLUTION INTRAMUSCULAR; INTRAVENOUS; SUBCUTANEOUS at 04:46

## 2019-02-14 RX ADMIN — PANTOPRAZOLE SODIUM 40 MG: 40 TABLET, DELAYED RELEASE ORAL at 09:19

## 2019-02-14 RX ADMIN — HYDROCODONE BITARTRATE AND ACETAMINOPHEN 1 TABLET: 7.5; 325 TABLET ORAL at 09:45

## 2019-02-14 RX ADMIN — DOCUSATE SODIUM 100 MG: 100 CAPSULE, LIQUID FILLED ORAL at 09:19

## 2019-02-14 RX ADMIN — Medication 5 ML: at 23:16

## 2019-02-14 NOTE — PROGRESS NOTES
Admit Date: 2/13/2019 Subjective:  
 
Saúl Fiore is resting. Objective:  
 
Patient Vitals for the past 8 hrs: 
 BP Temp Pulse Resp SpO2  
02/14/19 0446 120/71 99 °F (37.2 °C) 91 16 95 % 02/14/19 0051 95/61 98.7 °F (37.1 °C) (!) 102 16 98 % No intake/output data recorded. 02/12 1901 - 02/14 0700 In: 2919 [P.O.:75; I.V.:2844] Out: 4561 [[id number]] Physical Exam: 
GENERAL: alert, cooperative, no distress LUNG: clear to auscultation bilaterally HEART: regular rate and rhythm, S1, S2 
ABDOMEN: soft, non-tender. Active BS 
NEUROLOGIC: AOx3 Data Review Recent Results (from the past 24 hour(s)) HGB & HCT Collection Time: 02/14/19  4:22 AM  
Result Value Ref Range HGB 11.7 11.7 - 15.4 g/dL HCT 35.5 (L) 35.8 - 46.3 % Assessment:  
 
Active Problems: 
  Right nephrolithiasis (2/13/2019) POD 1: 
 
POSTOPERATIVE DIAGNOSIS:  Right renal pelvis calculus, approximately 2.5 cm in size. 
  
PROCEDURES PERFORMED: 
1. Right percutaneous nephrostolithotomy, greater than 2 cm. 2.  Right antegrade nephrostogram. 
3.  Right antegrade nephrostogram interpretation. Hgb 11.7 VSS, afebrile Plan:  
 
Nephrostomy tube capped. Dye removed. Monitor for pain at the nephrostomy site as well as fevers. Will plan to remove nephrostomy tube tomorrow if she does well today. Monitor voiding. Saline-lock IV. Ambulate. Luly Birmingham NP [facility] I have reviewed the above note and examined the patient. I agree with the exam, assessment and plan. Joon Kidd MD

## 2019-02-14 NOTE — OP NOTES
[address] [facility] 
OPERATIVE REPORT Name:  Parminder León 
MR#:  [id number] :  1975 ACCOUNT #:  [de-identified] DATE OF SERVICE:  2019 PREOPERATIVE DIAGNOSIS:  Right renal pelvis calculus, approximately 2.5 cm in size. POSTOPERATIVE DIAGNOSIS:  Right renal pelvis calculus, approximately 2.5 cm in size. PROCEDURES PERFORMED: 
1. Right percutaneous nephrostolithotomy, greater than 2 cm. 2.  Right antegrade nephrostogram. 
3.  Right antegrade nephrostogram interpretation. SURGEON:  Laura Lemus. Blaise Meigs, MD 
 
ASSISTANT:  None. ANESTHESIA:  General. 
 
COMPLICATIONS:  None apparent. SPECIMENS REMOVED:  Stone fragments IMPLANTS:  R JJ stent ESTIMATED BLOOD LOSS:  20 mL. 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 
period of just 6 months formed a large 2.5 cm stone in the right renal pelvis. After 
discussing the risks versus benefits, she decided to move forward with the 
above-named procedure. FINDINGS:  The stone was able to be fragmented and then removed during today's 
procedure and without incident. TECHNIQUE:  The patient was taken to the operating room, placed in the supine 
position. Anesthesia was induced via Anesthesiology service. This was done supine 
on the stretcher. A Dye catheter was placed. She had already been to Interventional Radiology for placement of a right-sided nephroureteral catheter. We 
then positioned her prone on the surgical table adequately padding all pressure 
points. After prepping and draping, I placed a super stiff wire through the 
nephroureteral catheter on the right until a good curl was noted in the bladder by 
fluoroscopy and the catheter was removed. I then used an 8/10 coaxial dilator sheath set to advance the second wire until good 
curl was seen in the bladder once again and it was then removed. The scalpel was used to make a small skin incision at the site and I then advanced the 30-Peruvian NephroMax balloon over the working wire until its tip was directly next to the stone 
within the renal pelvis. The balloon was then inflated on a fluoroscopic 
visualization and the 30-Peruvian sheath was then advanced over the balloon into the 
appropriate position with its tip close to the renal pelvis and the balloon was 
removed. The rigid nephroscope was advanced through the sheath and the stone was immediately 
visible within the renal pelvis. The Olympus LithoClast was used to break the stone 
in the small fragments and suck them from the kidney. This was continued until the 
stone had been completely removed. I brought the sheath back just slightly, so I 
could then visualize the mid and upper pole collecting system with no other stone 
fragments noted and removed the rigid nephroscope. The 6-Peruvian x 24 cm double-J ureteral stent was then deployed over the working wire 
with its distal tip visible curling within the bladder and its proximal tip visible 
curling within the renal pelvis. I then advanced a 20-Peruvian [place]-tip catheter 
through the sheath until its tip was within the renal pelvis, removed the sheath and 
secured the catheter to the level of the skin using two 2-0 nylon permanent stitches. I then removed the safety wire and lastly performed an antegrade nephrostogram by 
injecting 30 mL of Conray through the 20-Peruvian [place]-tip catheter serving as a 
nephrostomy tube. Right antegrade nephrostogram interpretation:  There were no filling defects 
whatsoever within the collecting system of the right kidney, which the contrast 
filled well. Contrast drained besides the stent down the ureter and the only site of 
extravasation was directly next to the nephrostomy tube to the level of skin. This concluded today's procedure.   Bags were placed on both the nephrostomy tube and 
 the urethral Dye, and the patient was then placed supine and taken to PACU in 
stable condition. MD KAROL Quintanilla/[email or web address] 
D:  02/13/2019 11:08 
T:  02/13/2019 19:15 
JOB #:  [id number]

## 2019-02-14 NOTE — PROGRESS NOTES
Problem: Falls - Risk of 
Goal: *Absence of Falls Document Terri Crouch Fall Risk and appropriate interventions in the flowsheet. Outcome: Progressing Towards Goal 
Fall Risk Interventions: 
Mobility Interventions: Patient to call before getting OOB Medication Interventions: Teach patient to arise slowly, Patient to call before getting OOB Elimination Interventions: Call light in reach, Patient to call for help with toileting needs

## 2019-02-14 NOTE — PROGRESS NOTES
Spiritual Care Visit. Initial visit. Patient was visited by [facility]. Entered by Elías Ruelas, Staff Chaplain. Wright, El

## 2019-02-14 NOTE — PROGRESS NOTES
CM met with patient to complete intervention. Patient alert and oriented x4. Patient verified information no changes needed. Patient states she lives in a mobile home with her spouse and three steps to enter into the home. Patient states she's independent with her ADL's and have no DME's in the home. Patient states she plan on returning back home with no needs identified. CM will continue to follow. Care Management Interventions PCP Verified by CM: Srinivas Bustos,) Mode of Transport at Discharge: Other (see comment)(Family) Transition of Care Consult (CM Consult): Discharge Planning Discharge Durable Medical Equipment: No 
Physical Therapy Consult: No 
Occupational Therapy Consult: No 
Speech Therapy Consult: No 
Current Support Network: Own Home, Lives with Spouse Confirm Follow Up Transport: Family Plan discussed with Pt/Family/Caregiver: Yes Freedom of Choice Offered: Yes [employer] Information Provided?: No 
Discharge Location Discharge Placement: Home

## 2019-02-14 NOTE — PROGRESS NOTES
END OF SHIFT NOTE: 
 
INTAKE/OUTPUT 
02/13 0701 - 02/14 0700 In: 2919 [P.O.:75; I.V.:2844] Out: 9621 [ALNAC:6713] Voiding: YES Catheter: YES Drain:  
Nephrostomy Tube 02/13/19 Flank (Active) Nephroureteral Drain 02/13/19 Right Ureter (Active) Site Assessment Clean, dry, & intact 2/14/2019  5:19 AM  
Dressing Status Clean, dry, & intact 2/14/2019  5:19 AM  
Status Draining;Patent 2/14/2019  5:19 AM  
Urine Output (mL) 300 ml 2/14/2019  5:19 AM  
 
 
 
 
 
 
Flatus: Patient does not have flatus present. Stool:  0 occurrences. Characteristics: 
  
Emesis: 0 occurrences. Characteristics: VITAL SIGNS Patient Vitals for the past 12 hrs: 
 Temp Pulse Resp BP SpO2  
02/14/19 0446 99 °F (37.2 °C) 91 16 120/71 95 % 02/14/19 0051 98.7 °F (37.1 °C) (!) 102 16 95/61 98 % 02/13/19 1933 99.3 °F (37.4 °C) 88 16 101/60 98 % Pain Assessment Pain Intensity 1: 8 (02/14/19 0446) Pain Location 1: Flank Pain Intervention(s) 1: Medication (see MAR) Patient Stated Pain Goal: 0 Ambulating No 
 
Patient just left to go to CT. Shift report given to oncoming nurse at the bedside. Ari Joseph

## 2019-02-15 ENCOUNTER — ANESTHESIA EVENT (OUTPATIENT)
Dept: SURGERY | Age: 44
End: 2019-02-15
Payer: MEDICAID

## 2019-02-15 PROCEDURE — 74011250637 HC RX REV CODE- 250/637: Performed by: NURSE PRACTITIONER

## 2019-02-15 PROCEDURE — 74011250637 HC RX REV CODE- 250/637: Performed by: UROLOGY

## 2019-02-15 PROCEDURE — 99218 HC RM OBSERVATION: CPT

## 2019-02-15 PROCEDURE — 65270000029 HC RM PRIVATE

## 2019-02-15 PROCEDURE — 74011250636 HC RX REV CODE- 250/636: Performed by: UROLOGY

## 2019-02-15 PROCEDURE — 96374 THER/PROPH/DIAG INJ IV PUSH: CPT

## 2019-02-15 RX ORDER — TAMSULOSIN HYDROCHLORIDE 0.4 MG/1
0.4 CAPSULE ORAL DAILY
Status: DISCONTINUED | OUTPATIENT
Start: 2019-02-15 | End: 2019-02-16

## 2019-02-15 RX ORDER — CEFAZOLIN SODIUM/WATER 2 G/20 ML
2 SYRINGE (ML) INTRAVENOUS
Status: COMPLETED | OUTPATIENT
Start: 2019-02-15 | End: 2019-02-16

## 2019-02-15 RX ORDER — DEXTROSE MONOHYDRATE AND SODIUM CHLORIDE 5; .45 G/100ML; G/100ML
125 INJECTION, SOLUTION INTRAVENOUS CONTINUOUS
Status: DISCONTINUED | OUTPATIENT
Start: 2019-02-15 | End: 2019-02-17 | Stop reason: HOSPADM

## 2019-02-15 RX ADMIN — DOCUSATE SODIUM 100 MG: 100 CAPSULE, LIQUID FILLED ORAL at 17:45

## 2019-02-15 RX ADMIN — PANTOPRAZOLE SODIUM 40 MG: 40 TABLET, DELAYED RELEASE ORAL at 06:03

## 2019-02-15 RX ADMIN — ATENOLOL 12.5 MG: 25 TABLET ORAL at 09:59

## 2019-02-15 RX ADMIN — HYDROMORPHONE HYDROCHLORIDE 1 MG: 1 INJECTION, SOLUTION INTRAMUSCULAR; INTRAVENOUS; SUBCUTANEOUS at 00:39

## 2019-02-15 RX ADMIN — TAMSULOSIN HYDROCHLORIDE 0.4 MG: 0.4 CAPSULE ORAL at 15:59

## 2019-02-15 RX ADMIN — HYDROCODONE BITARTRATE AND ACETAMINOPHEN 1 TABLET: 7.5; 325 TABLET ORAL at 11:22

## 2019-02-15 RX ADMIN — Medication 10 ML: at 14:26

## 2019-02-15 RX ADMIN — DOCUSATE SODIUM 100 MG: 100 CAPSULE, LIQUID FILLED ORAL at 08:21

## 2019-02-15 RX ADMIN — Medication 5 ML: at 06:04

## 2019-02-15 RX ADMIN — Medication 5 ML: at 22:44

## 2019-02-15 RX ADMIN — HYDROCODONE BITARTRATE AND ACETAMINOPHEN 1 TABLET: 7.5; 325 TABLET ORAL at 21:56

## 2019-02-15 NOTE — PROGRESS NOTES
Pt has 4 mm mid left ureteral stone seen on CT. Although pt does not have any left flank pain, she has long hx of renal/ureteral stones that usually do not pass on their own. Will restart IV hydration and PO flomax. Will schedule her for cystoscopy, left ureteroscopy, laser lithotripsy and possible left ureteral stent insertion tomorrow in the event that she cannot pass stone. Strain urine. NPO at MN. IV ancef on call to OR. I have reviewed the above and agree with the information within. Shayy Su.  Fatimah Mendoza MD

## 2019-02-15 NOTE — PROGRESS NOTES
Admit Date: 2/13/2019 Subjective:  
 
Bhavana Fiore is resting. She is voiding well. Nephrostomy plugged yesterday and she has done well with this. Objective:  
 
Patient Vitals for the past 8 hrs: 
 BP Temp Pulse Resp SpO2  
02/15/19 0801 106/68 98.9 °F (37.2 °C) 97 18 92 % 02/15/19 0330 105/68 98.8 °F (37.1 °C) 85 17 95 % No intake/output data recorded. 02/13 1901 - 02/15 0700 In: 8545 [I.V.:1344] Out: 4225 [JTVIO:5636] Physical Exam: 
GENERAL: alert, cooperative, no distress LUNG: clear to auscultation bilaterally HEART: regular rate and rhythm, S1, S2  
ABDOMEN: soft, non-tender. Active BS 
NEUROLOGIC: AOx3 Data Review No results found for this or any previous visit (from the past 24 hour(s)). Assessment:  
 
Active Problems: 
  Right nephrolithiasis (2/13/2019) POD 2: 
  
POSTOPERATIVE DIAGNOSIS:  Right renal pelvis calculus, approximately 2.5 cm in size. 
  
PROCEDURES PERFORMED: 1.  Right percutaneous nephrostolithotomy, greater than 2 cm. 2.  Right antegrade nephrostogram. 
3.  Right antegrade nephrostogram interpretation. 
  
  
Hgb 11.7 VSS, afebrile Plan:  
 
Nephrostomy tube removed, pt tolerated well. D/C home today and f/u in 10-14 days for cystoscopy and stent removal.  Office will notify her of appt time/date. Dino Rodriguez NP [facility] I have reviewed the above note and examined the patient. I agree with the exam, assessment and plan. Twyla Lopez.  Norm Bermeo MD

## 2019-02-15 NOTE — PROGRESS NOTES
Problem: Falls - Risk of 
Goal: *Absence of Falls Document Terese Dunn Fall Risk and appropriate interventions in the flowsheet. Outcome: Progressing Towards Goal 
Fall Risk Interventions: 
Mobility Interventions: Bed/chair exit alarm, Communicate number of staff needed for ambulation/transfer, OT consult for ADLs, Patient to call before getting OOB, PT Consult for mobility concerns, PT Consult for assist device competence, Strengthening exercises (ROM-active/passive), Utilize walker, cane, or other assistive device Medication Interventions: Assess postural VS orthostatic hypotension, Bed/chair exit alarm, Patient to call before getting OOB, Teach patient to arise slowly Elimination Interventions: Bed/chair exit alarm, Call light in reach, Patient to call for help with toileting needs, Toileting schedule/hourly rounds

## 2019-02-15 NOTE — PROGRESS NOTES
END OF SHIFT NOTE: 
 
INTAKE/OUTPUT 
02/14 0701 - 02/15 0700 In: -  
Out: [address] [PJJLU:1201] Voiding: YES Catheter: NO 
Drain:  
Nephrostomy Tube 02/13/19 Flank (Active) Status Clamped 2/14/2019  5:12 PM  
   
Nephroureteral Drain 02/13/19 Right Ureter (Active) Site Assessment Clean, dry, & intact 2/15/2019  2:00 AM  
Dressing Status Clean, dry, & intact 2/15/2019  2:00 AM  
Status Clamped 2/15/2019  2:00 AM  
Urine Output (mL) 100 ml 2/14/2019  7:30 AM  
 
 
 
 
 
 
Flatus: Patient does have flatus present. Stool:  0 occurrences. Characteristics: 
  
Emesis: 0 occurrences. Characteristics: VITAL SIGNS Patient Vitals for the past 12 hrs: 
 Temp Pulse Resp BP SpO2  
02/15/19 0330 98.8 °F (37.1 °C) 85 17 105/68 95 % 02/14/19 2340 99.1 °F (37.3 °C) 99 17 98/64 96 % 02/14/19 2106 98.8 °F (37.1 °C) 86 17 98/66 96 % Pain Assessment Pain Intensity 1: 0 (02/15/19 0139) Pain Location 1: Flank Pain Intervention(s) 1: Medication (see MAR) Patient Stated Pain Goal: 0 Ambulating Yes Received pain medication x1 this shift. Continue with some right flank pain. Nephrostomy tube intact and clamped. Shift report given to oncoming nurse at the bedside. Wes Crespo

## 2019-02-15 NOTE — PROGRESS NOTES
Problem: Falls - Risk of 
Goal: *Absence of Falls Document Dennis Joseph Fall Risk and appropriate interventions in the flowsheet. Outcome: Progressing Towards Goal 
Fall Risk Interventions: 
Mobility Interventions: Bed/chair exit alarm, Communicate number of staff needed for ambulation/transfer, OT consult for ADLs, Patient to call before getting OOB, PT Consult for mobility concerns, PT Consult for assist device competence, Strengthening exercises (ROM-active/passive), Utilize walker, cane, or other assistive device Medication Interventions: Assess postural VS orthostatic hypotension, Bed/chair exit alarm, Patient to call before getting OOB, Teach patient to arise slowly Elimination Interventions: Bed/chair exit alarm, Call light in reach, Patient to call for help with toileting needs, Toileting schedule/hourly rounds

## 2019-02-16 ENCOUNTER — ANESTHESIA (OUTPATIENT)
Dept: SURGERY | Age: 44
End: 2019-02-16
Payer: MEDICAID

## 2019-02-16 PROCEDURE — 74011250636 HC RX REV CODE- 250/636: Performed by: NURSE PRACTITIONER

## 2019-02-16 PROCEDURE — 77030037088 HC TUBE ENDOTRACH ORAL NSL COVD-A: Performed by: ANESTHESIOLOGY

## 2019-02-16 PROCEDURE — 76210000006 HC OR PH I REC 0.5 TO 1 HR: Performed by: UROLOGY

## 2019-02-16 PROCEDURE — 77030039425 HC BLD LARYNG TRULITE DISP TELE -A: Performed by: ANESTHESIOLOGY

## 2019-02-16 PROCEDURE — 99218 HC RM OBSERVATION: CPT

## 2019-02-16 PROCEDURE — 74011250636 HC RX REV CODE- 250/636: Performed by: ANESTHESIOLOGY

## 2019-02-16 PROCEDURE — 77030018846 HC SOL IRR STRL H20 ICUM -A: Performed by: UROLOGY

## 2019-02-16 PROCEDURE — 74011250637 HC RX REV CODE- 250/637: Performed by: UROLOGY

## 2019-02-16 PROCEDURE — 76060000033 HC ANESTHESIA 1 TO 1.5 HR: Performed by: UROLOGY

## 2019-02-16 PROCEDURE — 65270000029 HC RM PRIVATE

## 2019-02-16 PROCEDURE — 77030033647: Performed by: UROLOGY

## 2019-02-16 PROCEDURE — 77030009934 HC PRB IRR KT UTMD -B: Performed by: UROLOGY

## 2019-02-16 PROCEDURE — 77030019927 HC TBNG IRR CYSTO BAXT -A: Performed by: UROLOGY

## 2019-02-16 PROCEDURE — 77030018836 HC SOL IRR NACL ICUM -A: Performed by: UROLOGY

## 2019-02-16 PROCEDURE — C1769 GUIDE WIRE: HCPCS | Performed by: UROLOGY

## 2019-02-16 PROCEDURE — 74011250636 HC RX REV CODE- 250/636

## 2019-02-16 PROCEDURE — 77030032490 HC SLV COMPR SCD KNE COVD -B: Performed by: UROLOGY

## 2019-02-16 PROCEDURE — 74011250636 HC RX REV CODE- 250/636: Performed by: UROLOGY

## 2019-02-16 PROCEDURE — 74011250637 HC RX REV CODE- 250/637: Performed by: ANESTHESIOLOGY

## 2019-02-16 PROCEDURE — 77030020253 HC SOL INJ D545NS .05 DEX .45 SAL

## 2019-02-16 PROCEDURE — 74011000250 HC RX REV CODE- 250

## 2019-02-16 PROCEDURE — C2617 STENT, NON-COR, TEM W/O DEL: HCPCS | Performed by: UROLOGY

## 2019-02-16 PROCEDURE — 74011000258 HC RX REV CODE- 258: Performed by: NURSE PRACTITIONER

## 2019-02-16 PROCEDURE — 77030035011 HC LSR FBR HOLM FLXIVA TRAC TIP BSC -F: Performed by: UROLOGY

## 2019-02-16 PROCEDURE — 74011250637 HC RX REV CODE- 250/637: Performed by: NURSE PRACTITIONER

## 2019-02-16 PROCEDURE — 76010000138 HC OR TIME 0.5 TO 1 HR: Performed by: UROLOGY

## 2019-02-16 PROCEDURE — 74011636320 HC RX REV CODE- 636/320: Performed by: UROLOGY

## 2019-02-16 DEVICE — URETERAL STENT
Type: IMPLANTABLE DEVICE | Site: URETER | Status: FUNCTIONAL
Brand: PERCUFLEX™ PLUS

## 2019-02-16 RX ORDER — DOCUSATE SODIUM 100 MG/1
100 CAPSULE, LIQUID FILLED ORAL 2 TIMES DAILY
Qty: 60 CAP | Refills: 2 | Status: SHIPPED | OUTPATIENT
Start: 2019-02-16 | End: 2019-03-07

## 2019-02-16 RX ORDER — GLYCOPYRROLATE 0.2 MG/ML
INJECTION INTRAMUSCULAR; INTRAVENOUS AS NEEDED
Status: DISCONTINUED | OUTPATIENT
Start: 2019-02-16 | End: 2019-02-16 | Stop reason: HOSPADM

## 2019-02-16 RX ORDER — LIDOCAINE HYDROCHLORIDE 20 MG/ML
INJECTION, SOLUTION EPIDURAL; INFILTRATION; INTRACAUDAL; PERINEURAL AS NEEDED
Status: DISCONTINUED | OUTPATIENT
Start: 2019-02-16 | End: 2019-02-16 | Stop reason: HOSPADM

## 2019-02-16 RX ORDER — FAMOTIDINE 20 MG/1
20 TABLET, FILM COATED ORAL ONCE
Status: DISCONTINUED | OUTPATIENT
Start: 2019-02-16 | End: 2019-02-16 | Stop reason: HOSPADM

## 2019-02-16 RX ORDER — NEOSTIGMINE METHYLSULFATE 1 MG/ML
INJECTION INTRAVENOUS AS NEEDED
Status: DISCONTINUED | OUTPATIENT
Start: 2019-02-16 | End: 2019-02-16 | Stop reason: HOSPADM

## 2019-02-16 RX ORDER — SODIUM CHLORIDE, SODIUM LACTATE, POTASSIUM CHLORIDE, CALCIUM CHLORIDE 600; 310; 30; 20 MG/100ML; MG/100ML; MG/100ML; MG/100ML
125 INJECTION, SOLUTION INTRAVENOUS CONTINUOUS
Status: DISCONTINUED | OUTPATIENT
Start: 2019-02-16 | End: 2019-02-16 | Stop reason: HOSPADM

## 2019-02-16 RX ORDER — MIDAZOLAM HYDROCHLORIDE 1 MG/ML
2 INJECTION, SOLUTION INTRAMUSCULAR; INTRAVENOUS ONCE
Status: DISCONTINUED | OUTPATIENT
Start: 2019-02-16 | End: 2019-02-16 | Stop reason: HOSPADM

## 2019-02-16 RX ORDER — MIDAZOLAM HYDROCHLORIDE 1 MG/ML
2 INJECTION, SOLUTION INTRAMUSCULAR; INTRAVENOUS
Status: COMPLETED | OUTPATIENT
Start: 2019-02-16 | End: 2019-02-16

## 2019-02-16 RX ORDER — HYDROCODONE BITARTRATE AND ACETAMINOPHEN 7.5; 325 MG/1; MG/1
1 TABLET ORAL
Qty: 20 TAB | Refills: 0 | Status: SHIPPED | OUTPATIENT
Start: 2019-02-16 | End: 2019-03-07

## 2019-02-16 RX ORDER — KETOROLAC TROMETHAMINE 30 MG/ML
30 INJECTION, SOLUTION INTRAMUSCULAR; INTRAVENOUS AS NEEDED
Status: DISCONTINUED | OUTPATIENT
Start: 2019-02-16 | End: 2019-02-16 | Stop reason: HOSPADM

## 2019-02-16 RX ORDER — PROPOFOL 10 MG/ML
INJECTION, EMULSION INTRAVENOUS AS NEEDED
Status: DISCONTINUED | OUTPATIENT
Start: 2019-02-16 | End: 2019-02-16 | Stop reason: HOSPADM

## 2019-02-16 RX ORDER — OXYCODONE HYDROCHLORIDE 5 MG/1
10 TABLET ORAL
Status: COMPLETED | OUTPATIENT
Start: 2019-02-16 | End: 2019-02-16

## 2019-02-16 RX ORDER — FENTANYL CITRATE 50 UG/ML
100 INJECTION, SOLUTION INTRAMUSCULAR; INTRAVENOUS ONCE
Status: DISCONTINUED | OUTPATIENT
Start: 2019-02-16 | End: 2019-02-16 | Stop reason: HOSPADM

## 2019-02-16 RX ORDER — FENTANYL CITRATE 50 UG/ML
INJECTION, SOLUTION INTRAMUSCULAR; INTRAVENOUS AS NEEDED
Status: DISCONTINUED | OUTPATIENT
Start: 2019-02-16 | End: 2019-02-16 | Stop reason: HOSPADM

## 2019-02-16 RX ORDER — SCOLOPAMINE TRANSDERMAL SYSTEM 1 MG/1
1 PATCH, EXTENDED RELEASE TRANSDERMAL ONCE
Status: DISCONTINUED | OUTPATIENT
Start: 2019-02-16 | End: 2019-02-17 | Stop reason: HOSPADM

## 2019-02-16 RX ORDER — HYDROMORPHONE HYDROCHLORIDE 2 MG/ML
0.5 INJECTION, SOLUTION INTRAMUSCULAR; INTRAVENOUS; SUBCUTANEOUS
Status: DISCONTINUED | OUTPATIENT
Start: 2019-02-16 | End: 2019-02-16 | Stop reason: HOSPADM

## 2019-02-16 RX ORDER — NALOXONE HYDROCHLORIDE 0.4 MG/ML
0.1 INJECTION, SOLUTION INTRAMUSCULAR; INTRAVENOUS; SUBCUTANEOUS AS NEEDED
Status: DISCONTINUED | OUTPATIENT
Start: 2019-02-16 | End: 2019-02-16 | Stop reason: HOSPADM

## 2019-02-16 RX ORDER — ROCURONIUM BROMIDE 10 MG/ML
INJECTION, SOLUTION INTRAVENOUS AS NEEDED
Status: DISCONTINUED | OUTPATIENT
Start: 2019-02-16 | End: 2019-02-16 | Stop reason: HOSPADM

## 2019-02-16 RX ORDER — LIDOCAINE HYDROCHLORIDE 10 MG/ML
0.1 INJECTION INFILTRATION; PERINEURAL AS NEEDED
Status: DISCONTINUED | OUTPATIENT
Start: 2019-02-16 | End: 2019-02-16 | Stop reason: HOSPADM

## 2019-02-16 RX ORDER — ONDANSETRON 2 MG/ML
INJECTION INTRAMUSCULAR; INTRAVENOUS AS NEEDED
Status: DISCONTINUED | OUTPATIENT
Start: 2019-02-16 | End: 2019-02-16 | Stop reason: HOSPADM

## 2019-02-16 RX ORDER — SODIUM CHLORIDE, SODIUM LACTATE, POTASSIUM CHLORIDE, CALCIUM CHLORIDE 600; 310; 30; 20 MG/100ML; MG/100ML; MG/100ML; MG/100ML
50 INJECTION, SOLUTION INTRAVENOUS CONTINUOUS
Status: DISCONTINUED | OUTPATIENT
Start: 2019-02-17 | End: 2019-02-16 | Stop reason: HOSPADM

## 2019-02-16 RX ORDER — DEXAMETHASONE SODIUM PHOSPHATE 4 MG/ML
INJECTION, SOLUTION INTRA-ARTICULAR; INTRALESIONAL; INTRAMUSCULAR; INTRAVENOUS; SOFT TISSUE AS NEEDED
Status: DISCONTINUED | OUTPATIENT
Start: 2019-02-16 | End: 2019-02-16 | Stop reason: HOSPADM

## 2019-02-16 RX ADMIN — LIDOCAINE HYDROCHLORIDE 100 MG: 20 INJECTION, SOLUTION EPIDURAL; INFILTRATION; INTRACAUDAL; PERINEURAL at 09:55

## 2019-02-16 RX ADMIN — ROCURONIUM BROMIDE 40 MG: 10 INJECTION, SOLUTION INTRAVENOUS at 09:55

## 2019-02-16 RX ADMIN — DEXAMETHASONE SODIUM PHOSPHATE 10 MG: 4 INJECTION, SOLUTION INTRA-ARTICULAR; INTRALESIONAL; INTRAMUSCULAR; INTRAVENOUS; SOFT TISSUE at 10:01

## 2019-02-16 RX ADMIN — OXYCODONE HYDROCHLORIDE 10 MG: 5 TABLET ORAL at 11:05

## 2019-02-16 RX ADMIN — GLYCOPYRROLATE 0.4 MG: 0.2 INJECTION INTRAMUSCULAR; INTRAVENOUS at 10:40

## 2019-02-16 RX ADMIN — DOCUSATE SODIUM 100 MG: 100 CAPSULE, LIQUID FILLED ORAL at 19:29

## 2019-02-16 RX ADMIN — HYDROMORPHONE HYDROCHLORIDE 1 MG: 1 INJECTION, SOLUTION INTRAMUSCULAR; INTRAVENOUS; SUBCUTANEOUS at 12:37

## 2019-02-16 RX ADMIN — Medication 10 ML: at 15:54

## 2019-02-16 RX ADMIN — NEOSTIGMINE METHYLSULFATE 3 MG: 1 INJECTION INTRAVENOUS at 10:40

## 2019-02-16 RX ADMIN — HYDROCODONE BITARTRATE AND ACETAMINOPHEN 1 TABLET: 7.5; 325 TABLET ORAL at 22:54

## 2019-02-16 RX ADMIN — ATENOLOL 12.5 MG: 25 TABLET ORAL at 06:40

## 2019-02-16 RX ADMIN — MIDAZOLAM HYDROCHLORIDE 2 MG: 1 INJECTION, SOLUTION INTRAMUSCULAR; INTRAVENOUS at 08:05

## 2019-02-16 RX ADMIN — SODIUM CHLORIDE, SODIUM LACTATE, POTASSIUM CHLORIDE, AND CALCIUM CHLORIDE 125 ML/HR: 600; 310; 30; 20 INJECTION, SOLUTION INTRAVENOUS at 07:59

## 2019-02-16 RX ADMIN — TAMSULOSIN HYDROCHLORIDE 0.4 MG: 0.4 CAPSULE ORAL at 06:41

## 2019-02-16 RX ADMIN — ONDANSETRON 4 MG: 2 INJECTION INTRAMUSCULAR; INTRAVENOUS at 10:39

## 2019-02-16 RX ADMIN — PANTOPRAZOLE SODIUM 40 MG: 40 TABLET, DELAYED RELEASE ORAL at 06:41

## 2019-02-16 RX ADMIN — Medication 5 ML: at 06:44

## 2019-02-16 RX ADMIN — Medication 2 G: at 09:58

## 2019-02-16 RX ADMIN — PROPOFOL 170 MG: 10 INJECTION, EMULSION INTRAVENOUS at 09:54

## 2019-02-16 RX ADMIN — DEXTROSE MONOHYDRATE AND SODIUM CHLORIDE 125 ML/HR: 5; .45 INJECTION, SOLUTION INTRAVENOUS at 20:41

## 2019-02-16 RX ADMIN — FENTANYL CITRATE 100 MCG: 50 INJECTION, SOLUTION INTRAMUSCULAR; INTRAVENOUS at 09:54

## 2019-02-16 NOTE — PERIOP NOTES
TRANSFER - IN REPORT: 
 
Verbal report received from Magdaleno Meigs, [facility] on [address]  being received from 2nd floor, 204 for ordered procedure Report consisted of patients Situation, Background, Assessment and  
Recommendations(SBAR). Information from the following report(s) SBAR, Kardex, OR Summary, MAR, Recent Results and Procedure Verification was reviewed with the receiving nurse. Opportunity for questions and clarification was provided. Assessment completed upon patients arrival to unit and care assumed.

## 2019-02-16 NOTE — PERIOP NOTES
TRANSFER - OUT REPORT: 
 
Verbal report given to Candace(name) on [address]  being transferred to [facility] routine post - op Report consisted of patients Situation, Background, Assessment and  
Recommendations(SBAR). Information from the following report(s) SBAR, OR Summary, Procedure Summary, Intake/Output, MAR and Cardiac Rhythm NSR was reviewed with the receiving nurse. Lines:  
Peripheral IV 02/14/19 Right Forearm (Active) Site Assessment Clean, dry, & intact 2/16/2019 10:57 AM  
Phlebitis Assessment 0 2/16/2019 10:57 AM  
Infiltration Assessment 0 2/16/2019 10:57 AM  
Dressing Status Clean, dry, & intact 2/16/2019 10:57 AM  
Dressing Type Tape;Transparent 2/16/2019 10:57 AM  
Hub Color/Line Status Patent; Infusing;Blue 2/16/2019 10:57 AM  
Alcohol Cap Used No 2/16/2019 10:57 AM  
  
 
Opportunity for questions and clarification was provided. Patient transported with: 
 O2 @ 2 liters VTE prophylaxis orders have been written for [facility]. Patient and family given floor number and nurses name. Family updated re: pt status after security code verified.

## 2019-02-16 NOTE — OP NOTES
[facility] 
OPERATIVE REPORT Name:  Marilyn Apple 
MR#:  [id number] :  1975 ACCOUNT #:  [de-identified] DATE OF SERVICE:  2019 PREOPERATIVE DIAGNOSES:  Left proximal ureteral stone and left partial staghorn renal 
calculus. POSTOPERATIVE DIAGNOSES:  Left proximal ureteral stone and left partial staghorn 
renal calculus. PROCEDURE PERFORMED:  Cystoscopy, left ureteroscopy, laser lithotripsy, left 
retrograde pyelogram and left ureteral stent placement. SURGEON:  Leopoldo Fernandez DO 
 
ASSISTANT:  None. ANESTHESIA:  General. 
 
COMPLICATIONS:  None immediate. SPECIMENS REMOVED:  None. IMPLANTS:  Left ureteral stent. ESTIMATED BLOOD LOSS:  Nil. CLINICAL HISTORY:  This is a 66-year-old female who is status post percutaneous 
nephrostolithotomy for a large right renal stone on 2019 by Dr Cleveland Penaloza. Her 
postoperative imaging revealed a 4 mm left proximal ureteral stone as well as a left 
partial staghorn calculus. All risks, benefits and alternatives to the above 
mentioned procedure have been reviewed and she is willing to proceed at this time. DESCRIPTION OF OPERATIVE PROCEDURE:  The patient's consent was obtained and the 
patient was brought back to the operating room; at which time, she was placed in the 
supine position. After the uneventful induction of general anesthesia, she was then 
placed in a dorsal lithotomy position. Her genital area was prepped and draped and a 
sterile field applied. A 22-Citizen of [place] cystoscope was inserted into urethra and advanced 
into to the bladder under direct visualization. The bladder was systematically 
surveyed. No abnormalities were seen. A stent was seen emanating from the right 
ureteral orifice. A guidewire was passed at the left collecting system without 
difficulty. Semi-rigid ureteroscopy was then performed.   I did visualize an 
 approximately 4 mm to 5 mm stone in the left proximal ureter. This was fragmented 
nicely using a 365 micron holmium laser lithotripsy fiber. Following adequate 
fragmentation, I then surveyed the entire ureter. No other stones or abnormalities 
were seen. The semi-rigid ureteroscope was removed and a flexible ureteroscope was 
passed over the wire to the level of the kidney. The entire kidney was surveyed. A 
partial staghorn calculus was seen in the lower pole of the kidney. I was able to 
treat the crown or apex of the stone, this did fragment nicely using a 200 micron 
holmium laser lithotripsy fiber. I was unable to access the portion of the stone in 
the lower most calyx. I was unable to angle the ureteroscope in order to adequately 
treat the stone. I tired several different methods, all of which were unsuccessful. At this point, a retrograde pyelogram was performed through the scope outlining the 
left collecting system, mild left hydronephrosis was seen. No extravasation of 
contrast was noted. The wire was then replaced and the scope was removed in a 
retrograde fashion. No ureteral stones or other abnormalities were noted. A 6 x 24 
double-J ureteral stent was then passed under cystoscopic and fluoroscopic guidance. Excellent curl was noted proximally as well as distally. The patient's bladder was 
drained and the procedure was terminated. The patient tolerated the procedure well. I will have her follow up with Dr Adelaida Montaño in 1 to 2 weeks for a postoperative KUB. INTERPRETATION OF LEFT RETROGRADE PYELOGRAM:  A left retrograde pyelogram was 
preformed through the flexible ureteroscope outlining the left renal collecting 
system. Mild left hydronephrosis was seen. No extravasation of contrast was noted. A filling defect was noted in the lower most calyx. ALEK PEACOCK DO 
 
 
[id number] 
D:  02/16/2019 11:05 
T:  02/16/2019 17:52 JOB #:  [id number]

## 2019-02-16 NOTE — PROGRESS NOTES
TRANSFER - IN REPORT: 
 
Verbal report received from [address] [place] [address]  being received from PACU for routine post - op Report consisted of patients Situation, Background, Assessment and  
Recommendations(SBAR). Information from the following report(s) SBAR, Kardex, Procedure Summary, Intake/Output, MAR, Med Rec Status and Cardiac Rhythm NSR was reviewed with the receiving nurse. Opportunity for questions and clarification was provided. Assessment completed upon patients arrival to unit and care assumed.

## 2019-02-16 NOTE — PROGRESS NOTES
END OF SHIFT NOTE: 
 
INTAKE/OUTPUT 
02/15 0701 - 02/16 0700 In: -  
Out: 200 [Urine:200] Voiding: YES Catheter: NO 
Drain:  
Nephrostomy Tube 02/13/19 Flank (Active) Status Clamped 2/14/2019  5:12 PM  
   
Nephroureteral Drain 02/13/19 Right Ureter (Active) Site Assessment Clean, dry, & intact 2/15/2019  2:00 AM  
Dressing Status Clean, dry, & intact 2/15/2019  2:00 AM  
Status Clamped 2/15/2019  2:00 AM  
Urine Output (mL) 100 ml 2/14/2019  7:30 AM  
 
 
 
 
 
 
Flatus: Patient does have flatus present. Stool:  0 occurrences. Characteristics: 
  
Emesis: 0 occurrences. Characteristics: VITAL SIGNS Patient Vitals for the past 12 hrs: 
 Temp Pulse Resp BP SpO2  
02/16/19 0746 98.4 °F (36.9 °C) 82 18  98 % 02/16/19 0727 98.7 °F (37.1 °C) 84 17 105/65 99 % 02/16/19 0417 98.1 °F (36.7 °C) 79 17 105/70 95 % 02/15/19 2359 98.6 °F (37 °C) 76 17 99/59 95 % Pain Assessment Pain Intensity 1: 0 (02/16/19 0746) Pain Location 1: Flank Pain Intervention(s) 1: Medication (see MAR) Patient Stated Pain Goal: 0 Ambulating Yes Patient prepped for surgery this shift. Both baths given. Prn pain medication x1 this shift. No complaints of nausea or pain at shift change this am. Spouse at bedside. Dressing to right back flank intact. Shift report given to oncoming nurse at the bedside. Rosas Rooney

## 2019-02-16 NOTE — PROGRESS NOTES
Discharge delayed by MD til today 2/17/2019. No new orders received. Pt for discharge home with no supportive care orders received at this time.

## 2019-02-16 NOTE — ANESTHESIA POSTPROCEDURE EVALUATION
Procedure(s): 
CYSTOSCOPY LEFT  URETEROSCOPY/LASER LITHO/STENT . Anesthesia Post Evaluation Patient location during evaluation: PACU Patient participation: complete - patient participated Level of consciousness: responsive to verbal stimuli Pain management: adequate Airway patency: patent Anesthetic complications: no 
Cardiovascular status: acceptable Respiratory status: acceptable Hydration status: euvolemic Visit Vitals /56 Pulse 67 Temp 37.3 °C (99.1 °F) Resp 13 Ht 5' 3\" (1.6 m) Wt 72.1 kg (159 lb) SpO2 98% BMI 28.17 kg/m²

## 2019-02-16 NOTE — PROGRESS NOTES
No complaints in preop area. AVSS Chest CTA 
CV RRR Abd soft, ND. No new labs CT reviewed. L proximal ureteral stone and LLP partial staghorn calculus. To OR today for cysto, L URS, laser and stent placement. All risks, benefits and alternatives to the above mentioned procedure were discussed and the patient is willing to proceed at this time.

## 2019-02-16 NOTE — ROUTINE PROCESS
Patient stated her pain was not controlled and would like to stay until tomorrow. Notified physician on call, and physician stated that it was okay for the patient to stay and to discontinue the discharge order.

## 2019-02-16 NOTE — BRIEF OP NOTE
BRIEF OPERATIVE NOTE Date of Procedure: 2/16/2019 Preoperative Diagnosis: left ureteral stone, L partial staghorn renal stone Postoperative Diagnosis: same Procedure(s): 
CYSTOSCOPY LEFT  URETEROSCOPY/LASER LITHO/STENT/RPG Surgeon(s) and Role: * Inocencio Fernandez, DO - Primary Surgical Assistant: None Surgical Staff: 
Circ-1: Preston Leiva RN Event Time In Time Out Incision Start 02/16/2019 1016 Incision Close 02/16/2019 1041 Anesthesia: General  
Estimated Blood Loss: Nil Specimens: * No specimens in log * Findings: see op note Complications: none immediate Implants:  
Implant Name Type Inv. Item Serial No.  Lot No. LRB No. Used Action STENT URET 6F 24CM -- [facility] - [id number]  STENT URET 6F 24CM -- [address] [facility] [id number]  [employer] Shira Martinez [id number] Left 1 Implanted

## 2019-02-17 ENCOUNTER — APPOINTMENT (OUTPATIENT)
Dept: GENERAL RADIOLOGY | Age: 44
End: 2019-02-17
Attending: UROLOGY
Payer: MEDICAID

## 2019-02-17 VITALS
BODY MASS INDEX: 28.17 KG/M2 | HEART RATE: 65 BPM | WEIGHT: 159 LBS | SYSTOLIC BLOOD PRESSURE: 106 MMHG | RESPIRATION RATE: 18 BRPM | DIASTOLIC BLOOD PRESSURE: 62 MMHG | TEMPERATURE: 98.6 F | HEIGHT: 63 IN | OXYGEN SATURATION: 98 %

## 2019-02-17 PROCEDURE — 99218 HC RM OBSERVATION: CPT

## 2019-02-17 PROCEDURE — 74011000258 HC RX REV CODE- 258: Performed by: NURSE PRACTITIONER

## 2019-02-17 PROCEDURE — 74011250636 HC RX REV CODE- 250/636: Performed by: UROLOGY

## 2019-02-17 PROCEDURE — 77030020253 HC SOL INJ D545NS .05 DEX .45 SAL

## 2019-02-17 PROCEDURE — 74011250637 HC RX REV CODE- 250/637: Performed by: UROLOGY

## 2019-02-17 PROCEDURE — 74018 RADEX ABDOMEN 1 VIEW: CPT

## 2019-02-17 RX ORDER — KETOROLAC TROMETHAMINE 30 MG/ML
30 INJECTION, SOLUTION INTRAMUSCULAR; INTRAVENOUS
Status: COMPLETED | OUTPATIENT
Start: 2019-02-17 | End: 2019-02-17

## 2019-02-17 RX ORDER — IBUPROFEN 400 MG/1
400 TABLET ORAL
Status: DISCONTINUED | OUTPATIENT
Start: 2019-02-17 | End: 2019-02-17 | Stop reason: HOSPADM

## 2019-02-17 RX ADMIN — SODIUM PHOSPHATE 1 ENEMA: 7; 19 ENEMA RECTAL at 13:28

## 2019-02-17 RX ADMIN — KETOROLAC TROMETHAMINE 30 MG: 30 INJECTION, SOLUTION INTRAMUSCULAR at 13:28

## 2019-02-17 RX ADMIN — HYDROMORPHONE HYDROCHLORIDE 1 MG: 1 INJECTION, SOLUTION INTRAMUSCULAR; INTRAVENOUS; SUBCUTANEOUS at 03:12

## 2019-02-17 RX ADMIN — IBUPROFEN 400 MG: 400 TABLET ORAL at 17:15

## 2019-02-17 RX ADMIN — DOCUSATE SODIUM 100 MG: 100 CAPSULE, LIQUID FILLED ORAL at 17:50

## 2019-02-17 RX ADMIN — DEXTROSE MONOHYDRATE AND SODIUM CHLORIDE 125 ML/HR: 5; .45 INJECTION, SOLUTION INTRAVENOUS at 03:16

## 2019-02-17 RX ADMIN — PANTOPRAZOLE SODIUM 40 MG: 40 TABLET, DELAYED RELEASE ORAL at 07:57

## 2019-02-17 RX ADMIN — HYDROCODONE BITARTRATE AND ACETAMINOPHEN 1 TABLET: 7.5; 325 TABLET ORAL at 18:48

## 2019-02-17 RX ADMIN — DOCUSATE SODIUM 100 MG: 100 CAPSULE, LIQUID FILLED ORAL at 09:27

## 2019-02-17 RX ADMIN — ATENOLOL 12.5 MG: 25 TABLET ORAL at 07:58

## 2019-02-17 RX ADMIN — Medication 10 ML: at 03:13

## 2019-02-17 RX ADMIN — Medication 10 ML: at 15:12

## 2019-02-17 RX ADMIN — HYDROCODONE BITARTRATE AND ACETAMINOPHEN 1 TABLET: 7.5; 325 TABLET ORAL at 11:21

## 2019-02-17 NOTE — PROGRESS NOTES
Reports mild stent discomfort and constipation. AF.  VSS Abd soft, NT, ND No new labs. S/P R PNL POD#4. L URS POD#1 Enema. Home later. Rx in chart. RTO in 1 wk with Dr. Marshall Chandler.

## 2019-02-17 NOTE — PROGRESS NOTES
Spoke with Dr. Sonia Fonseca via phone regarding uncontrolled pain, patient stating \"if we d/c her today she will go straight to the ER\" verbal order given for a one time dose of toradol 30 mg IV. If no relief from medication, patient is ok to stay

## 2019-02-17 NOTE — PROGRESS NOTES
Spoke with Dr. Haresh Kevin regarding patients pain and verbal order given to order 400 mg ibuprofen with every meal. Patient also stated concerns about stones and  having another ultrasound/imaging. Verbal order for KUB given to check stent placement, and stones. All orders placed.

## 2019-02-17 NOTE — DISCHARGE INSTRUCTIONS
DISCHARGE SUMMARY from Nurse    PATIENT INSTRUCTIONS:    After general anesthesia or intravenous sedation, for 24 hours or while taking prescription Narcotics:  · Limit your activities  · Do not drive and operate hazardous machinery  · Do not make important personal or business decisions  · Do  not drink alcoholic beverages  · If you have not urinated within 8 hours after discharge, please contact your surgeon on call. Report the following to your surgeon:  · Excessive pain, swelling, redness or odor of or around the surgical area  · Temperature over 100.5  · Nausea and vomiting lasting longer than 4 hours or if unable to take medications  · Any signs of decreased circulation or nerve impairment to extremity: change in color, persistent  numbness, tingling, coldness or increase pain  · Any questions    What to do at Home:  Recommended activity: No lifting 5-10 lbs, Driving when taking pain medication, difficulty/pain with urination or Strenuous exercise for 2-3 weeks. If you experience any of the following symptoms fever of 101 or greater, intolerable pain, yellow/green draining from incison , please follow up with Dr. Keena Stewart. *  Please give a list of your current medications to your Primary Care Provider. *  Please update this list whenever your medications are discontinued, doses are      changed, or new medications (including over-the-counter products) are added. *  Please carry medication information at all times in case of emergency situations. These are general instructions for a healthy lifestyle:    No smoking/ No tobacco products/ Avoid exposure to second hand smoke  Surgeon General's Warning:  Quitting smoking now greatly reduces serious risk to your health.     Obesity, smoking, and sedentary lifestyle greatly increases your risk for illness    A healthy diet, regular physical exercise & weight monitoring are important for maintaining a healthy lifestyle    You may be retaining fluid if you have a history of heart failure or if you experience any of the following symptoms:  Weight gain of 3 pounds or more overnight or 5 pounds in a week, increased swelling in our hands or feet or shortness of breath while lying flat in bed. Please call your doctor as soon as you notice any of these symptoms; do not wait until your next office visit. Recognize signs and symptoms of STROKE:    F-face looks uneven    A-arms unable to move or move unevenly    S-speech slurred or non-existent    T-time-call 911 as soon as signs and symptoms begin-DO NOT go       Back to bed or wait to see if you get better-TIME IS BRAIN. Warning Signs of HEART ATTACK     Call 911 if you have these symptoms:   Chest discomfort. Most heart attacks involve discomfort in the center of the chest that lasts more than a few minutes, or that goes away and comes back. It can feel like uncomfortable pressure, squeezing, fullness, or pain.  Discomfort in other areas of the upper body. Symptoms can include pain or discomfort in one or both arms, the back, neck, jaw, or stomach.  Shortness of breath with or without chest discomfort.  Other signs may include breaking out in a cold sweat, nausea, or lightheadedness. Don't wait more than five minutes to call 911 - MINUTES MATTER! Fast action can save your life. Calling 911 is almost always the fastest way to get lifesaving treatment. Emergency Medical Services staff can begin treatment when they arrive -- up to an hour sooner than if someone gets to the hospital by car. The discharge information has been reviewed with the patient and spouse. The patient and spouse verbalized understanding. Discharge medications reviewed with the patient and spouse and appropriate educational materials and side effects teaching were provided.   ___________________________________________________________________________________________________________________________________        Patient Education        Kidney Stone: Care Instructions  Your Care Instructions    Kidney stones are formed when salts, minerals, and other substances normally found in the urine clump together. They can be as small as grains of sand or, rarely, as large as golf balls. While the stone is traveling through the ureter, which is the tube that carries urine from the kidney to the bladder, you will probably feel pain. The pain may be mild or very severe. You may also have some blood in your urine. As soon as the stone reaches the bladder, any intense pain should go away. If a stone is too large to pass on its own, you may need a medical procedure to help you pass the stone. The doctor has checked you carefully, but problems can develop later. If you notice any problems or new symptoms, get medical treatment right away. Follow-up care is a key part of your treatment and safety. Be sure to make and go to all appointments, and call your doctor if you are having problems. It's also a good idea to know your test results and keep a list of the medicines you take. How can you care for yourself at home? · Drink plenty of fluids, enough so that your urine is light yellow or clear like water. If you have kidney, heart, or liver disease and have to limit fluids, talk with your doctor before you increase the amount of fluids you drink. · Take pain medicines exactly as directed. Call your doctor if you think you are having a problem with your medicine. ? If the doctor gave you a prescription medicine for pain, take it as prescribed. ? If you are not taking a prescription pain medicine, ask your doctor if you can take an over-the-counter medicine. Read and follow all instructions on the label. · Your doctor may ask you to strain your urine so that you can collect your kidney stone when it passes. You can use a kitchen strainer or a tea strainer to catch the stone.  Store it in a plastic bag until you see your doctor again.  Preventing future kidney stones  Some changes in your diet may help prevent kidney stones. Depending on the cause of your stones, your doctor may recommend that you:  · Drink plenty of fluids, enough so that your urine is light yellow or clear like water. If you have kidney, heart, or liver disease and have to limit fluids, talk with your doctor before you increase the amount of fluids you drink. · Limit coffee, tea, and alcohol. Also avoid grapefruit juice. · Do not take more than the recommended daily dose of vitamins C and D.  · Avoid antacids such as Gaviscon, Maalox, Mylanta, or Tums. · Limit the amount of salt (sodium) in your diet. · Eat a balanced diet that is not too high in protein. · Limit foods that are high in a substance called oxalate, which can cause kidney stones. These foods include dark green vegetables, rhubarb, chocolate, wheat bran, nuts, cranberries, and beans. When should you call for help? Call your doctor now or seek immediate medical care if:    · You cannot keep down fluids.     · Your pain gets worse.     · You have a fever or chills.     · You have new or worse pain in your back just below your rib cage (the flank area).     · You have new or more blood in your urine.    Watch closely for changes in your health, and be sure to contact your doctor if:    · You do not get better as expected. Where can you learn more? Go to http://www.gray.com/. Enter E466 in the search box to learn more about \"Kidney Stone: Care Instructions. \"  Current as of: March 14, 2018  Content Version: 11.9  © 2827-7663 [employer]. Care instructions adapted under license by [employer] (which disclaims liability or warranty for this information).  If you have questions about a medical condition or this instruction, always ask your healthcare professional. [address] any warranty or liability for your use of this information. Patient Education        Learning About Diet for Kidney Stone Prevention  What are kidney stones? Kidney stones are made of salts and minerals in the urine that form small \"alfonso. \" Stones can form in the kidneys and the ureters (the tubes that lead from the kidneys to the bladder). They can also form in the bladder. Stones may not cause a problem as long as they stay in the kidneys. But they can cause sudden, severe pain. Pain is most likely when the stones travel from the kidneys to the bladder. Kidney stones can cause bloody urine. Kidney stones often run in families. You are more likely to get them if you don't drink enough fluids, mainly water. Certain foods and drinks and some dietary supplements may also increase your risk for kidney stones if you consume too much of them. What can you do to prevent kidney stones? Changing what you eat may not prevent all types of kidney stones. But for people who have a history of certain kinds of kidney stones, some changes in diet may help. A dietitian can help you set up a meal plan that includes healthy, low-oxalate choices. Here are some general guidelines to get you started. Plan your meals and snacks around foods that are low in oxalate. These foods include:  · Corn, kale, parsnips, and squash,. · Beef, chicken, pork, turkey, and fish. · Milk, butter, cheese, and yogurt. You can eat certain foods that are medium-high in oxalate, but eat them only once in a while. These foods include:  · Bread. · Brown rice. · English muffins. · Figs. · Popcorn. · String beans. · Tomatoes. Limit very high-oxalate foods, including:  · Black tea. · Coffee. · Chocolate. · Dark green vegetables. · Nuts. Here are some other things you can do to help prevent kidney stones. · Drink plenty of fluids. If you have kidney, heart, or liver disease and have to limit fluids, talk with your doctor before you increase the amount of fluids you drink.   · Do not take more than the recommended daily dose of vitamins C and D.  · Limit the salt in your diet. · Eat a balanced diet that is not too high in protein. Follow-up care is a key part of your treatment and safety. Be sure to make and go to all appointments, and call your doctor if you are having problems. It's also a good idea to know your test results and keep a list of the medicines you take. Where can you learn more? Go to http://juju-bertha.info/. Enter C138 in the search box to learn more about \"Learning About Diet for Kidney Stone Prevention. \"  Current as of: March 28, 2018  Content Version: 11.9  © 6739-6970 [employer]. Care instructions adapted under license by [employer] (which disclaims liability or warranty for this information). If you have questions about a medical condition or this instruction, always ask your healthcare professional. [place] [address] any warranty or liability for your use of this information. Patient Education        Ureteral Stent Placement: What to Expect at Home  Your Recovery    A ureteral (say \"you-REE-ter-ul\") stent is a thin, hollow tube that is placed in the ureter to help urine pass from the kidney into the bladder. Ureters are the tubes that connect the kidneys to the bladder. You may have a small amount of blood in your urine for 1 to 3 days after the procedure. While the stent is in place, you may have to urinate more often, feel a sudden need to urinate, or feel like you cannot completely empty your bladder. You may feel some pain when you urinate or do strenuous activity. You also may notice a small amount of blood in your urine after strenuous activities. These side effects usually do not prevent people from doing their normal daily activities. You may have a thin string coming out of your urethra. Your urethra is the tube that carries urine from your bladder to outside your body.  This string is attached to the stent. Try not to pull on the string. The doctor will use the string to pull out the stent when you no longer need it. After the procedure, urine may flow better from your kidneys to your bladder. A ureteral stent may be left in place for several days or for as long as several months. Your doctor will take it out when you no longer need it. This care sheet gives you a general idea about how long it will take for you to recover. But each person recovers at a different pace. Follow the steps below to get better as quickly as possible. How can you care for yourself at home? Activity    · Rest when you feel tired. Getting enough sleep will help you recover.     · Avoid strenuous activities, such as bicycle riding, jogging, weight lifting, or aerobic exercise, until your doctor says it is okay.     · Ask your doctor when you can drive again.     · Most people are able to return to work the day after the procedure. If your work requires intense activity, you may feel pain in your kidney area or get tired easily. If this happens, you may need to do less strenuous activities while the stent is in.     · Ask your doctor when it is okay for you to have sex. Diet    · You can eat your normal diet. If your stomach is upset, try bland, low-fat foods like plain rice, broiled chicken, toast, and yogurt.     · Drink plenty of fluids (unless your doctor tells you not to). Medicines    · Your doctor will tell you if and when you can restart your medicines. He or she will also give you instructions about taking any new medicines.     · If you take blood thinners, such as warfarin (Coumadin), clopidogrel (Plavix), or aspirin, be sure to talk to your doctor. He or she will tell you if and when to start taking those medicines again. Make sure that you understand exactly what your doctor wants you to do.     · Be safe with medicines. Take pain medicines exactly as directed.   ? If the doctor gave you a prescription medicine for pain, take it as prescribed. ? If you are not taking a prescription pain medicine, ask your doctor if you can take an over-the-counter medicine.     · If you think your pain medicine is making you sick to your stomach:  ? Take your medicine after meals (unless your doctor has told you not to). ? Ask your doctor for a different pain medicine.     · If your doctor prescribed antibiotics, take them as directed. Do not stop taking them just because you feel better. You need to take the full course of antibiotics. Follow-up care is a key part of your treatment and safety. Be sure to make and go to all appointments, and call your doctor if you are having problems. It's also a good idea to know your test results and keep a list of the medicines you take. When should you call for help? Call 911 anytime you think you may need emergency care. For example, call if:    · You passed out (lost consciousness).     · You have severe trouble breathing.     · You have sudden chest pain and shortness of breath, or you cough up blood.     · You have severe belly pain.    Call your doctor now or seek immediate medical care if:    · Part or all of the stent comes out of your urethra.     · You have pain that does not get better after you take pain medicine.     · You have symptoms of a urinary infection. For example:  ? You have blood or pus in your urine. ? You have pain in your back just below your rib cage. This is called flank pain. ? You have a fever, chills, or body aches. ? It hurts to urinate. ? You have groin or belly pain.     · You cannot control when you urinate, or you leak urine.    Watch closely for changes in your health, and be sure to contact your doctor if you have any problems. Where can you learn more? Go to http://juju-bertha.info/. Enter X135 in the search box to learn more about \"Ureteral Stent Placement: What to Expect at Home. \"  Current as of: March 20, 2018  Content Version: 11.9  © 2839-6448 [employer]. Care instructions adapted under license by [employer] (which disclaims liability or warranty for this information). If you have questions about a medical condition or this instruction, always ask your healthcare professional. [address] any warranty or liability for your use of this information.

## 2019-02-18 NOTE — DISCHARGE SUMMARY
Discharge Summary     Patient: Janelle Schultz MRN: [id number]  SSN: [id number]    YOB: 1975  Age: 37 y.o. Sex: female      Allergies: Ciprofloxacin    Admit Date: 2/13/2019    Discharge Date: 2/18/2019     * Admission Diagnoses:  Kidney stone [N20.0]  Right nephrolithiasis [N20.0]     * Discharge Diagnoses:   Hospital Problems as of 2/17/2019 Date Reviewed: 2/6/2019          Codes Class Noted - Resolved POA    Right nephrolithiasis ICD-10-CM: N20.0  ICD-9-CM: 592.0  2/13/2019 - Present Unknown               * Procedures for this admission:   Procedure(s):  CYSTOSCOPY LEFT  URETEROSCOPY/LASER LITHO/STENT       * Disposition: Home    * Discharged Condition: improved    * Hospital Course:      Ms. Chaparro Bermeo is a 78-year-old female with 2.5 cm right renal pelvis calculus, who presented here for a:  1.  Right percutaneous nephrostolithotomy, greater than 2 cm. 2.  Right antegrade nephrostogram.  3.  Right antegrade nephrostogram interpretation.     Dye removed and nephrostomy tube capped on POD 1. She voided well. Nephrostomy tube was removed on POD 2. She did well with this. 4 mm left ureteral stone incidentally seen on follow-up CT A/P. She underwent a cystoscopy, left ureteroscopy, laser lithotripsy and left ureteral stent insertion on 2/16. She discharged home on 2/17 and will RTO in 1 week to see Dr. Fatimah Mendoza. Discharge Medications:   Discharge Medication List as of 2/17/2019  6:31 PM      START taking these medications    Details   HYDROcodone-acetaminophen (NORCO) 7.5-325 mg per tablet Take 1 Tab by mouth every six (6) hours as needed for Pain. Max Daily Amount: 4 Tabs., Print, Disp-20 Tab, R-0      docusate sodium (COLACE) 100 mg capsule Take 1 Cap by mouth two (2) times a day for 90 days. , Print, Disp-60 Cap, R-2         CONTINUE these medications which have NOT CHANGED    Details   Omeprazole delayed release (PRILOSEC D/R) 20 mg tablet Take 20 mg by mouth two (2) times a day., Historical Med      HYDROcodone-acetaminophen (NORCO) 5-325 mg per tablet Take 1 Tab by mouth every six (6) hours as needed for Pain. Max Daily Amount: 4 Tabs., Print, Disp-20 Tab, R-0      potassium citrate (UROCIT-K 10) 10 mEq (1,080 mg) TbER Take 1 Tab by mouth two (2) times a day., Normal, Disp-180 Tab, R-4      atenolol (TENORMIN) 25 mg tablet Take 12.5 mg by mouth every morning., Historical Med      trimethoprim-sulfamethoxazole (BACTRIM DS) 160-800 mg per tablet Take 1 Tab by mouth two (2) times a day., Normal, Disp-14 Tab, R-0              * Follow-up Care/Patient Instructions: Activity: Activity as tolerated  Diet: Regular Diet  Wound Care: None needed    Follow-up Information     Follow up With Specialties Details Why Contact Info    Liseth Lima NP Nurse Practitioner   [facility] [address] [phone]      Chela Dacosta MD Urology Call today [address] [facility] A FOLLOW UP APPOINTMENT [address] [place] [address] [address]   [phone]             Signed By: Wilfredo Michaels NP     February 18, 2019       I have reviewed the above discharge summary and agree with the information within. Karo Demarco.  Keena Stewart MD

## 2019-02-18 NOTE — PROGRESS NOTES
Patient discharged, instructions given to patient and spouse. Patient and spouse voiced understanding of discharge instructions, s/sx of infection, follow up,how to manage incision, and RX.

## 2019-02-19 NOTE — OP NOTES
[address] [place] [address]  OPERATIVE REPORT    Name:  Álvaro Sweeney  MR#:  [id number]  :  1975  ACCOUNT #:  [de-identified]  DATE OF SERVICE:  2019    PREOPERATIVE DIAGNOSES:  Left proximal ureteral stone and left partial staghorn renal  calculus. POSTOPERATIVE DIAGNOSES:  Left proximal ureteral stone and left partial staghorn  renal calculus. PROCEDURE PERFORMED:  Cystoscopy, left ureteroscopy, laser lithotripsy, left  retrograde pyelogram and left ureteral stent placement. SURGEON:  Deepali Fernandez DO    ASSISTANT:  None. ANESTHESIA:  General.    COMPLICATIONS:  None immediate. SPECIMENS REMOVED:  None. IMPLANTS:  Left ureteral stent. ESTIMATED BLOOD LOSS:  ____. CLINICAL HISTORY:  This is a 24-year-old female who is status post percutaneous  nephrostolithotomy for a large right renal stone on 2019 by Dr Claudette Echeverria. Her  postoperative imaging revealed a 4 mm left proximal ureteral stone as well as a left  partial staghorn calculus. All risks, benefits and alternatives to the above  mentioned procedure have been reviewed and she is willing to proceed at this time. DESCRIPTION OF OPERATIVE PROCEDURE:  The patient's consent was obtained and the  patient was brought back to the operating room; at which time, she was placed in the  supine position. After the uneventful induction of general anesthesia, she was then  placed in a dorsal lithotomy position. Her genital area was prepped and draped and a  sterile field applied. A 22-[place] citizen cystoscope was inserted into urethra and advanced  into to the bladder under direct visualization. The bladder was systematically  surveyed. No abnormalities were seen. A stent was seen emanating from the right  ureteral orifice. A guidewire was passed at the left collecting system without  difficulty. Semi-rigid ureteroscopy was then performed. I did visualize an  approximately 4 mm to 5 mm stone in the left proximal ureter. This was fragmented  nicely using a 365 micron holmium laser lithotripsy fiber. Following adequate  fragmentation, I then surveyed the entire ureter. No other stones or abnormalities  were seen. The semi-rigid ureteroscope was removed and a flexible ureteroscope was  passed over the wire to the level of the kidney. The entire kidney was surveyed. A  partial staghorn calculus was seen in the lower pole of the kidney. I was able to  treat the crown or apex of the stone, this did fragment nicely using a 200 micron  holmium laser lithotripsy fiber. I was unable to access the portion of the stone in  the lower most calyx. I was unable to angle the ureteroscope in order to adequately  treat the stone. I tired several different methods, all of which were unsuccessful. At this point, a retrograde pyelogram was performed through the scope outlining the  left collecting system, mild left hydronephrosis was seen. No extravasation of  contrast was noted. The wire was then replaced and the scope was removed in a  retrograde fashion. No ureteral stones or other abnormalities were noted. A 6 x 24  double-J ureteral stent was then passed under cystoscopic and fluoroscopic guidance. Excellent curl was noted proximally as well as distally. The patient's bladder was  drained and the procedure was terminated. The patient tolerated the procedure well. I will have her follow up with Dr Rojelio Fraser in 1 to 2 weeks for a postoperative KUB. INTERPRETATION OF LEFT RETROGRADE PYELOGRAM:  A left retrograde pyelogram was  preformed through the flexible ureteroscope outlining the left renal collecting  system. Mild left hydronephrosis was seen. No extravasation of contrast was noted. A filling defect was noted in the lower most calyx.         Gail Mccarthy DO      [id number]  D:  02/16/2019 11:05  T:  02/19/2019 11:23  JOB #:  [id number]

## 2019-02-19 NOTE — OP NOTES
[facility]  OPERATIVE REPORT    Name:  Pippa Terrell  MR#:  [id number]  :  1975  ACCOUNT #:  [de-identified]  DATE OF SERVICE:  2019      PREOPERATIVE DIAGNOSIS:  Right renal pelvis calculus, approximately 2.5 cm in size. POSTOPERATIVE DIAGNOSIS:  Right renal pelvis calculus, approximately 2.5 cm in size. PROCEDURES PERFORMED:  1. Right percutaneous nephrostolithotomy, greater than 2 cm. 2.  Right antegrade nephrostogram.  3.  Right antegrade nephrostogram interpretation. SURGEON:  Fredo Ly. Dimple Olson MD    ASSISTANT:  None. ANESTHESIA:  General.    COMPLICATIONS:  None apparent. SPECIMENS REMOVED:  Stone fragments     IMPLANTS:  JJ ureteral stent    ESTIMATED BLOOD LOSS:  20 mL. 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 After  discussing the risks versus benefits, she decided to move forward with the  above-named procedure. FINDINGS:  The stone was able to be fragmented and then removed during today's  procedure and without incident. TECHNIQUE:  The patient was taken to the operating room, placed in the supine  position. Anesthesia was induced via Anesthesiology service. This was done supine  on the stretcher. A Dye catheter was placed. She had already been to  Interventional Radiology for placement of a right-sided nephroureteral catheter. We  then positioned her prone on the surgical table adequately padding all pressure  points. After prepping and draping, I placed a super stiff wire through the  nephroureteral catheter on the right until a good curl was noted in the bladder by  fluoroscopy and the catheter was removed. I then used an 8/10 coaxial dilator sheath set to advance the second wire until good  curl was seen in the bladder once again and it was then removed.   The scalpel was  used to make a small skin incision at the site and I then advanced the 30-Bermudian  NephroMax balloon over the working wire until its tip was directly next to the stone  within the renal pelvis. The balloon was then inflated on a fluoroscopic  visualization and the 30-Bermudian sheath was then advanced over the balloon into the  appropriate position with its tip close to the renal pelvis and the balloon was  removed. The rigid nephroscope was advanced through the sheath and the stone was immediately  visible within the renal pelvis. The Olympus LithoClast was used to break the stone  in the small fragments and suck them from the kidney. This was continued until the  stone had been completely removed. I brought the sheath back just slightly, so I  could then visualize the mid and upper pole collecting system with no other stone  fragments noted and removed the rigid nephroscope. The 6-Bermudian x 24 cm double-J ureteral stent was then deployed over the working wire  with its distal tip visible curling within the bladder and its proximal tip visible  curling within the renal pelvis. I then advanced a 20-Bermudian Yocha Dehe-tip catheter  through the sheath until its tip was within the renal pelvis, removed the sheath and  secured the catheter to the level of the skin using two 2-0 nylon permanent stitches. I then removed the safety wire and lastly performed an antegrade nephrostogram by  injecting 30 mL of Conray through the 20-Bermudian Yocha Dehe-tip catheter serving as a  nephrostomy tube. Right antegrade nephrostogram interpretation:  There were no filling defects  whatsoever within the collecting system of the right kidney, which the contrast  filled well. Contrast drained besides the stent down the ureter and the only site of  extravasation was directly next to the nephrostomy tube to the level of skin. This concluded today's procedure.   Bags were placed on both the nephrostomy tube and  the urethral Dye, and the patient was then placed supine and taken to PACU in  stable condition.         Feliberto Escalera MD      [id number]  D:  02/13/2019 11:08  T:  02/19/2019 11:24  JOB #:  [id number]

## 2019-02-28 NOTE — H&P (VIEW-ONLY)
[facility]  [address]  [facility], [address]  [phone]    Oziel Miner  : 1975    Chief Complaint   Patient presents with    Procedure     cysto/stent removal          HPI     Oziel Miner is a 37 y.o. female seen in regards to nephrolithiasis, UTI's, and now 2 episodes of sepsis from obstructing ureteral stones.  The first was in .        The 2nd was in  and was due to a 5 mm L ureteral stone.  Stent was placed emergently 17.  Blood culture/urine culture returned Enterococcus.  ID was involved and she is still on IV PCN.  cystoscopy, LEFT ureteroscopy, laser lithotripsy, LEFT ureteral stent exchange was performed 17.        She also has had an issue with recurrent UTI's since sling placement in .        She underwent cystoscopy, LEFT ureteroscopy, laser lithotripsy, LEFT ureteral stent placement by Dr. Edu Velasco 10/21/17.      24 hour Brittaney Hilding was performed in .  PTH and serum Ca were normal.  UOP was 1600 ml and citrate levels were low.  She began urocitK 10 meq bid and increased fluid intake.  Repeat 24 hour urorisk revealed UOP increase to 2.7L daily AND citrate was normalized to 567 from 248!      She is now on daily bactrim per primary MD for UTI prophylaxis.     She underwent L ESWL 18 and did pass fragments.      KUB at visit in  revealed a new 2.5 cm R renal pelvis stone and 15 mm LLP renal stone. PTH and Calcium levels were both rechecked and normal.   She underwent R PCNL 19 and L ureteroscopy 19.   She now had a 6 mm LLP stone as her only known stone.           PVR: 17 0 ml              Past Medical History:   Diagnosis Date    Anxiety     managed with medication     Difficult intubation     16 sore throat and ulcers after intubation     Essential hypertension 2016    only when septic in 10/2016, takes atenolol for HR    GERD (gastroesophageal reflux disease)     OTC prn    Hiatal hernia     History of kidney stones     numerous    Kidney stone     left side    Nausea & vomiting     nausea after anesthesia    Paroxysmal SVT (supraventricular tachycardia) (HCC)     managed with ablation and medication      Past Surgical History:   Procedure Laterality Date    CARDIAC SURG PROCEDURE UNLIST      SVT ablation    CLOSE CYSTOSTOMY      for kidney stones    HX BLADDER SUSPENSION  04/2016    HX LITHOTRIPSY      HX SVT ABLATION  2003    HX TUBAL LIGATION  2006    HX UROLOGICAL  1/2017, 9/2017, 9/2017    cysto x 3     IR NEPHROURETERAL PERC RT PLC CATH NEW ACCESS  SI  2/13/2019     Current Outpatient Medications   Medication Sig Dispense Refill    HYDROcodone-acetaminophen (NORCO) 7.5-325 mg per tablet Take 1 Tab by mouth every six (6) hours as needed for Pain. Max Daily Amount: 4 Tabs. 20 Tab 0    docusate sodium (COLACE) 100 mg capsule Take 1 Cap by mouth two (2) times a day for 90 days. 60 Cap 2    Omeprazole delayed release (PRILOSEC D/R) 20 mg tablet Take 20 mg by mouth two (2) times a day.  trimethoprim-sulfamethoxazole (BACTRIM DS) 160-800 mg per tablet Take 1 Tab by mouth two (2) times a day. 14 Tab 0    HYDROcodone-acetaminophen (NORCO) 5-325 mg per tablet Take 1 Tab by mouth every six (6) hours as needed for Pain. Max Daily Amount: 4 Tabs. 20 Tab 0    potassium citrate (UROCIT-K 10) 10 mEq (1,080 mg) TbER Take 1 Tab by mouth two (2) times a day. 180 Tab 4    atenolol (TENORMIN) 25 mg tablet Take 12.5 mg by mouth every morning.        Allergies   Allergen Reactions    Ciprofloxacin Other (comments)     \"joint pain\"     Social History     Socioeconomic History    Marital status:      Spouse name: Not on file    Number of children: Not on file    Years of education: Not on file    Highest education level: Not on file   Social Needs    Financial resource strain: Not on file    Food insecurity - worry: Not on file    Food insecurity - inability: Not on file   [address] [place] [address] Transportation needs - medical: Not on file   [employer] needs - non-medical: Not on file   Occupational History    Not on file   Tobacco Use    Smoking status: Never Smoker    Smokeless tobacco: Never Used   Substance and Sexual Activity    Alcohol use: No    Drug use: No    Sexual activity: Not on file   Other Topics Concern    Not on file   Social History Narrative    Not on file     Family History   Problem Relation Age of Onset    Heart Disease Mother         congenital heart block with lalo    Diabetes Father     Hypertension Father     Cancer Father         melanoma (face)        ROS    Urinalysis  UA - Dipstick  Results for orders placed or performed in visit on 02/28/19   AMB POC URINALYSIS DIP STICK AUTO W/ MICRO (PGU)     Status: None   Result Value Ref Range Status    Glucose (UA POC) Negative Negative mg/dL Final    Bilirubin (UA POC) Negative Negative Final    Ketones (UA POC) Negative Negative Final    Specific gravity (UA POC) 1.025 1.001 - 1.035 Final    Blood (UA POC) Large Negative Final    pH (UA POC) 7 4.6 - 8.0 Final    Protein (UA POC) 300  Negative Final    Urobilinogen (POC) 0.2 mg/dL  Final    Nitrites (UA POC) Negative Negative Final    Leukocyte esterase (UA POC) Small Negative Final   Results for orders placed or performed in visit on 07/18/18   AMB POC URINALYSIS DIP STICK AUTO W/O MICRO (PGU)     Status: None   Result Value Ref Range Status    Glucose (UA POC) Negative Negative mg/dL Final    Bilirubin (UA POC) Negative Negative Final    Ketones (UA POC) Negative Negative Final    Specific gravity (UA POC) 1.020 1.001 - 1.035 Final    Blood (UA POC) Negative Negative Final    pH (UA POC) 7.0 4.6 - 8.0 Final    Protein (UA POC) Negative Negative Final    Urobilinogen (POC) 0.2 mg/dL  Final    Nitrites (UA POC) Negative Negative Final    Leukocyte esterase (UA POC) Negative Negative Final       UA - Micro  WBC - 0  RBC - tntc   Bacteria - 0  Epith - 0    Procedure in Detail:  After local anesthesia was administered, Ever Nataliya Stumbo was prepped and draped in the usual sterile fashion. A flexible cystoscope was used. Findings:  Urethra: Normal without strictures  Bladder neck: open  Bladder mucosa: Intact  Trebeculation: none  Diverticula: none  Ureteral orifices: normal  Stent(s):Bilateral-Removed without incident    Estimated Blood Loss:  Minimal to none    KUB: B stents in appropriate position: 6 mm faint LLP stone    Assessment and Plan    ICD-10-CM ICD-9-CM    1. Kidney stone N20.0 592.0 AMB POC URINALYSIS DIP STICK AUTO W/ MICRO (PGU)      CYSTOSCOPY,REMV CALCULUS,SIMPLE      AMB POC XRAY ABDOMEN 1 VIEW       B stents were removed. She will undergo L ESWL. Potential risks were discussed. I may then send her to see Dr. Renu Joiner at [facility].

## 2019-03-11 ENCOUNTER — ANESTHESIA EVENT (OUTPATIENT)
Dept: SURGERY | Age: 44
End: 2019-03-11
Payer: MEDICAID

## 2019-03-11 ENCOUNTER — HOSPITAL ENCOUNTER (OUTPATIENT)
Age: 44
Setting detail: OUTPATIENT SURGERY
Discharge: HOME | End: 2019-03-11
Attending: UROLOGY | Admitting: UROLOGY
Payer: MEDICAID

## 2019-03-11 ENCOUNTER — ANESTHESIA (OUTPATIENT)
Dept: SURGERY | Age: 44
End: 2019-03-11
Payer: MEDICAID

## 2019-03-11 VITALS
SYSTOLIC BLOOD PRESSURE: 120 MMHG | OXYGEN SATURATION: 96 % | TEMPERATURE: 98.8 F | HEART RATE: 90 BPM | DIASTOLIC BLOOD PRESSURE: 70 MMHG | BODY MASS INDEX: 26.57 KG/M2 | WEIGHT: 150 LBS | RESPIRATION RATE: 16 BRPM

## 2019-03-11 LAB
ALBUMIN SERPL-MCNC: 4 G/DL (ref 3.5–5)
ALBUMIN/GLOB SERPL: 1.1 {RATIO} (ref 1.2–3.5)
ALP SERPL-CCNC: 108 U/L (ref 50–136)
ALT SERPL-CCNC: 18 U/L (ref 12–65)
ANION GAP SERPL CALC-SCNC: 10 MMOL/L (ref 7–16)
ANION GAP SERPL CALC-SCNC: 6 MMOL/L (ref 7–16)
AST SERPL-CCNC: 11 U/L (ref 15–37)
BASOPHILS # BLD: 0 K/UL (ref 0–0.2)
BASOPHILS NFR BLD: 0 % (ref 0–2)
BILIRUB SERPL-MCNC: 0.3 MG/DL (ref 0.2–1.1)
BUN SERPL-MCNC: 12 MG/DL (ref 6–23)
BUN SERPL-MCNC: 19 MG/DL (ref 6–23)
CALCIUM SERPL-MCNC: 9.2 MG/DL (ref 8.3–10.4)
CALCIUM SERPL-MCNC: 9.3 MG/DL (ref 8.3–10.4)
CHLORIDE SERPL-SCNC: 106 MMOL/L (ref 98–107)
CHLORIDE SERPL-SCNC: 106 MMOL/L (ref 98–107)
CO2 SERPL-SCNC: 23 MMOL/L (ref 21–32)
CO2 SERPL-SCNC: 26 MMOL/L (ref 21–32)
CREAT SERPL-MCNC: 0.8 MG/DL (ref 0.6–1)
CREAT SERPL-MCNC: 0.91 MG/DL (ref 0.6–1)
DIFFERENTIAL METHOD BLD: ABNORMAL
EOSINOPHIL # BLD: 0 K/UL (ref 0–0.8)
EOSINOPHIL NFR BLD: 0 % (ref 0.5–7.8)
ERYTHROCYTE [DISTWIDTH] IN BLOOD BY AUTOMATED COUNT: 12 % (ref 11.9–14.6)
ERYTHROCYTE [DISTWIDTH] IN BLOOD BY AUTOMATED COUNT: 12 % (ref 11.9–14.6)
GLOBULIN SER CALC-MCNC: 3.7 G/DL (ref 2.3–3.5)
GLUCOSE SERPL-MCNC: 131 MG/DL (ref 65–100)
GLUCOSE SERPL-MCNC: 88 MG/DL (ref 65–100)
HCT VFR BLD AUTO: 37.6 % (ref 35.8–46.3)
HCT VFR BLD AUTO: 38.9 % (ref 35.8–46.3)
HGB BLD-MCNC: 12.8 G/DL (ref 11.7–15.4)
HGB BLD-MCNC: 13.1 G/DL (ref 11.7–15.4)
IMM GRANULOCYTES # BLD AUTO: 0 K/UL (ref 0–0.5)
IMM GRANULOCYTES NFR BLD AUTO: 0 % (ref 0–5)
LYMPHOCYTES # BLD: 0.8 K/UL (ref 0.5–4.6)
LYMPHOCYTES NFR BLD: 8 % (ref 13–44)
MCH RBC QN AUTO: 29.8 PG (ref 26.1–32.9)
MCH RBC QN AUTO: 30.1 PG (ref 26.1–32.9)
MCHC RBC AUTO-ENTMCNC: 33.7 G/DL (ref 31.4–35)
MCHC RBC AUTO-ENTMCNC: 34 G/DL (ref 31.4–35)
MCV RBC AUTO: 88.5 FL (ref 79.6–97.8)
MCV RBC AUTO: 88.6 FL (ref 79.6–97.8)
MONOCYTES # BLD: 0.5 K/UL (ref 0.1–1.3)
MONOCYTES NFR BLD: 6 % (ref 4–12)
NEUTS SEG # BLD: 8.5 K/UL (ref 1.7–8.2)
NEUTS SEG NFR BLD: 86 % (ref 43–78)
NRBC # BLD: 0 K/UL (ref 0–0.2)
NRBC # BLD: 0 K/UL (ref 0–0.2)
PLATELET # BLD AUTO: 246 K/UL (ref 150–450)
PLATELET # BLD AUTO: 280 K/UL (ref 150–450)
PMV BLD AUTO: 10.5 FL (ref 9.4–12.3)
PMV BLD AUTO: 10.5 FL (ref 9.4–12.3)
POTASSIUM BLD-SCNC: 4.7 MMOL/L (ref 3.5–5.1)
POTASSIUM SERPL-SCNC: 3.7 MMOL/L (ref 3.5–5.1)
POTASSIUM SERPL-SCNC: 3.9 MMOL/L (ref 3.5–5.1)
PROT SERPL-MCNC: 7.7 G/DL (ref 6.3–8.2)
RBC # BLD AUTO: 4.25 M/UL (ref 4.05–5.2)
RBC # BLD AUTO: 4.39 M/UL (ref 4.05–5.2)
SODIUM SERPL-SCNC: 138 MMOL/L (ref 136–145)
SODIUM SERPL-SCNC: 139 MMOL/L (ref 136–145)
TROPONIN I BLD-MCNC: 0.01 NG/ML (ref 0.02–0.05)
WBC # BLD AUTO: 7.8 K/UL (ref 4.3–11.1)
WBC # BLD AUTO: 9.9 K/UL (ref 4.3–11.1)

## 2019-03-11 PROCEDURE — 76210000020 HC REC RM PH II FIRST 0.5 HR: Performed by: UROLOGY

## 2019-03-11 PROCEDURE — 74011000250 HC RX REV CODE- 250

## 2019-03-11 PROCEDURE — 84443 ASSAY THYROID STIM HORMONE: CPT

## 2019-03-11 PROCEDURE — 76010000138 HC OR TIME 0.5 TO 1 HR: Performed by: UROLOGY

## 2019-03-11 PROCEDURE — 85027 COMPLETE CBC AUTOMATED: CPT

## 2019-03-11 PROCEDURE — 74011250636 HC RX REV CODE- 250/636: Performed by: UROLOGY

## 2019-03-11 PROCEDURE — 74011250636 HC RX REV CODE- 250/636

## 2019-03-11 PROCEDURE — 80053 COMPREHEN METABOLIC PANEL: CPT

## 2019-03-11 PROCEDURE — 85025 COMPLETE CBC W/AUTO DIFF WBC: CPT

## 2019-03-11 PROCEDURE — 83735 ASSAY OF MAGNESIUM: CPT

## 2019-03-11 PROCEDURE — 77030032490 HC SLV COMPR SCD KNE COVD -B: Performed by: UROLOGY

## 2019-03-11 PROCEDURE — 76060000033 HC ANESTHESIA 1 TO 1.5 HR: Performed by: UROLOGY

## 2019-03-11 PROCEDURE — 76210000063 HC OR PH I REC FIRST 0.5 HR: Performed by: UROLOGY

## 2019-03-11 PROCEDURE — 74011250636 HC RX REV CODE- 250/636: Performed by: ANESTHESIOLOGY

## 2019-03-11 PROCEDURE — 99285 EMERGENCY DEPT VISIT HI MDM: CPT | Performed by: EMERGENCY MEDICINE

## 2019-03-11 PROCEDURE — 84484 ASSAY OF TROPONIN QUANT: CPT

## 2019-03-11 PROCEDURE — 50590 FRAGMENTING OF KIDNEY STONE: CPT | Performed by: UROLOGY

## 2019-03-11 PROCEDURE — 84132 ASSAY OF SERUM POTASSIUM: CPT

## 2019-03-11 PROCEDURE — 93005 ELECTROCARDIOGRAM TRACING: CPT | Performed by: STUDENT IN AN ORGANIZED HEALTH CARE EDUCATION/TRAINING PROGRAM

## 2019-03-11 RX ORDER — PROPOFOL 10 MG/ML
INJECTION, EMULSION INTRAVENOUS AS NEEDED
Status: DISCONTINUED | OUTPATIENT
Start: 2019-03-11 | End: 2019-03-11 | Stop reason: HOSPADM

## 2019-03-11 RX ORDER — EPHEDRINE SULFATE 50 MG/ML
INJECTION, SOLUTION INTRAVENOUS AS NEEDED
Status: DISCONTINUED | OUTPATIENT
Start: 2019-03-11 | End: 2019-03-11 | Stop reason: HOSPADM

## 2019-03-11 RX ORDER — DEXAMETHASONE SODIUM PHOSPHATE 4 MG/ML
INJECTION, SOLUTION INTRA-ARTICULAR; INTRALESIONAL; INTRAMUSCULAR; INTRAVENOUS; SOFT TISSUE AS NEEDED
Status: DISCONTINUED | OUTPATIENT
Start: 2019-03-11 | End: 2019-03-11 | Stop reason: HOSPADM

## 2019-03-11 RX ORDER — MIDAZOLAM HYDROCHLORIDE 1 MG/ML
2 INJECTION, SOLUTION INTRAMUSCULAR; INTRAVENOUS
Status: COMPLETED | OUTPATIENT
Start: 2019-03-11 | End: 2019-03-11

## 2019-03-11 RX ORDER — SODIUM CHLORIDE 0.9 % (FLUSH) 0.9 %
5-40 SYRINGE (ML) INJECTION AS NEEDED
Status: DISCONTINUED | OUTPATIENT
Start: 2019-03-11 | End: 2019-03-11 | Stop reason: HOSPADM

## 2019-03-11 RX ORDER — LIDOCAINE HYDROCHLORIDE 20 MG/ML
INJECTION, SOLUTION EPIDURAL; INFILTRATION; INTRACAUDAL; PERINEURAL AS NEEDED
Status: DISCONTINUED | OUTPATIENT
Start: 2019-03-11 | End: 2019-03-11 | Stop reason: HOSPADM

## 2019-03-11 RX ORDER — HYDROMORPHONE HYDROCHLORIDE 2 MG/ML
0.5 INJECTION, SOLUTION INTRAMUSCULAR; INTRAVENOUS; SUBCUTANEOUS
Status: DISCONTINUED | OUTPATIENT
Start: 2019-03-11 | End: 2019-03-11 | Stop reason: HOSPADM

## 2019-03-11 RX ORDER — SODIUM CHLORIDE, SODIUM LACTATE, POTASSIUM CHLORIDE, CALCIUM CHLORIDE 600; 310; 30; 20 MG/100ML; MG/100ML; MG/100ML; MG/100ML
75 INJECTION, SOLUTION INTRAVENOUS CONTINUOUS
Status: DISCONTINUED | OUTPATIENT
Start: 2019-03-11 | End: 2019-03-11 | Stop reason: HOSPADM

## 2019-03-11 RX ORDER — OXYCODONE AND ACETAMINOPHEN 10; 325 MG/1; MG/1
1 TABLET ORAL AS NEEDED
Status: DISCONTINUED | OUTPATIENT
Start: 2019-03-11 | End: 2019-03-11 | Stop reason: HOSPADM

## 2019-03-11 RX ORDER — SODIUM CHLORIDE 0.9 % (FLUSH) 0.9 %
5-40 SYRINGE (ML) INJECTION EVERY 8 HOURS
Status: DISCONTINUED | OUTPATIENT
Start: 2019-03-11 | End: 2019-03-11 | Stop reason: HOSPADM

## 2019-03-11 RX ORDER — OXYCODONE HYDROCHLORIDE 5 MG/1
5 TABLET ORAL
Status: DISCONTINUED | OUTPATIENT
Start: 2019-03-11 | End: 2019-03-11 | Stop reason: HOSPADM

## 2019-03-11 RX ORDER — CEFAZOLIN SODIUM/WATER 2 G/20 ML
2 SYRINGE (ML) INTRAVENOUS
Status: COMPLETED | OUTPATIENT
Start: 2019-03-11 | End: 2019-03-11

## 2019-03-11 RX ORDER — ONDANSETRON 2 MG/ML
INJECTION INTRAMUSCULAR; INTRAVENOUS AS NEEDED
Status: DISCONTINUED | OUTPATIENT
Start: 2019-03-11 | End: 2019-03-11 | Stop reason: HOSPADM

## 2019-03-11 RX ORDER — FENTANYL CITRATE 50 UG/ML
INJECTION, SOLUTION INTRAMUSCULAR; INTRAVENOUS AS NEEDED
Status: DISCONTINUED | OUTPATIENT
Start: 2019-03-11 | End: 2019-03-11 | Stop reason: HOSPADM

## 2019-03-11 RX ADMIN — EPHEDRINE SULFATE 15 MG: 50 INJECTION, SOLUTION INTRAVENOUS at 09:48

## 2019-03-11 RX ADMIN — EPHEDRINE SULFATE 5 MG: 50 INJECTION, SOLUTION INTRAVENOUS at 09:43

## 2019-03-11 RX ADMIN — PROPOFOL 150 MG: 10 INJECTION, EMULSION INTRAVENOUS at 09:25

## 2019-03-11 RX ADMIN — ONDANSETRON 4 MG: 2 INJECTION INTRAMUSCULAR; INTRAVENOUS at 09:51

## 2019-03-11 RX ADMIN — DEXAMETHASONE SODIUM PHOSPHATE 4 MG: 4 INJECTION, SOLUTION INTRA-ARTICULAR; INTRALESIONAL; INTRAMUSCULAR; INTRAVENOUS; SOFT TISSUE at 09:51

## 2019-03-11 RX ADMIN — SODIUM CHLORIDE, SODIUM LACTATE, POTASSIUM CHLORIDE, AND CALCIUM CHLORIDE: 600; 310; 30; 20 INJECTION, SOLUTION INTRAVENOUS at 09:11

## 2019-03-11 RX ADMIN — LIDOCAINE HYDROCHLORIDE 100 MG: 20 INJECTION, SOLUTION EPIDURAL; INFILTRATION; INTRACAUDAL; PERINEURAL at 09:25

## 2019-03-11 RX ADMIN — FENTANYL CITRATE 50 MCG: 50 INJECTION, SOLUTION INTRAMUSCULAR; INTRAVENOUS at 09:11

## 2019-03-11 RX ADMIN — Medication 2 G: at 09:11

## 2019-03-11 RX ADMIN — MIDAZOLAM HYDROCHLORIDE 2 MG: 1 INJECTION, SOLUTION INTRAMUSCULAR; INTRAVENOUS at 09:11

## 2019-03-11 NOTE — ANESTHESIA PREPROCEDURE EVALUATION
Anesthetic History     Increased risk of difficult airway          Review of Systems / Medical History  Patient summary reviewed and pertinent labs reviewed    Pulmonary  Within defined limits                 Neuro/Psych   Within defined limits           Cardiovascular    Hypertension        Dysrhythmias : SVT      Exercise tolerance: >4 METS  Comments: S/p ablation   GI/Hepatic/Renal     GERD: well controlled    Renal disease: stones       Endo/Other             Other Findings              Physical Exam    Airway  Mallampati: II  TM Distance: > 6 cm  Neck ROM: normal range of motion   Mouth opening: Normal     Cardiovascular    Rhythm: regular           Dental         Pulmonary                 Abdominal  GI exam deferred       Other Findings            Anesthetic Plan    ASA: 2  Anesthesia type: general          Induction: Intravenous  Anesthetic plan and risks discussed with: Patient

## 2019-03-11 NOTE — DISCHARGE INSTRUCTIONS
Patient Education        Lithotripsy: What to Expect at Home  Your Recovery    Lithotripsy is a way to treat kidney stones without surgery. It is also called extracorporeal shock wave lithotripsy, or ESWL. This treatment uses sound waves to break kidney stones into tiny pieces. These pieces can then pass out of the body in the urine. You may have a small amount of blood in your urine after this treatment. Your urine may be slightly pink or reddish. The blood in the urine often goes away after 2 days. You may have a plastic tube inside one of your ureters. Ureters are the tubes that connect the kidneys to the bladder. The plastic tube is called a stent. It takes urine from your kidney to your bladder. This lets the stone pass more easily. Your doctor may remove the stent in about a week or two. This care sheet gives you a general idea about how long it will take for you to recover. But each person recovers at a different pace. Follow the steps below to feel better as quickly as possible. How can you care for yourself at home? Activity    · Rest as much as you need to after you go home.     · You may do your regular activities. But avoid hard exercise or sports for a week. Wait until there is no blood in your urine   Diet    · START WITH CLEAR LIQUIDS AND ADVANCE AS TOLERATED     · Drink plenty of fluids, enough so that your urine is light yellow or clear like water. If you have kidney, heart, or liver disease and have to limit fluids, talk with your doctor before you increase the amount of fluids you drink. Medicines    · Your doctor will tell you if and when you can restart your medicines. He or she will also give you instructions about taking any new medicines.     · If you take blood thinners, such as warfarin (Coumadin), clopidogrel (Plavix), or aspirin, be sure to talk to your doctor. He or she will tell you if and when to start taking those medicines again.  Make sure that you understand exactly what your doctor wants you to do.     · Be safe with medicines. Read and follow all instructions on the label. ? If the doctor gave you a prescription medicine for pain, take it as prescribed. ? If you are not taking a prescription pain medicine, ask your doctor if you can take acetaminophen (Tylenol). Do not take ibuprofen (Advil, Motrin) or naproxen (Aleve), or similar medicines unless your doctor tells you to. ? Do not take two or more pain medicines at the same time unless the doctor told you to. Many pain medicines have acetaminophen, which is Tylenol. Too much acetaminophen (Tylenol) can be harmful. Other instructions    · Urinate through the strainer  Save any stone pieces, including those that look like sand or gravel. Take these to your doctor. This will help your doctor find the cause of your stones. Follow-up care is a key part of your treatment and safety. Be sure to make and go to all appointments, and call your doctor if you are having problems. It's also a good idea to know your test results and keep a list of the medicines you take. When should you call for help? Call 911 anytime you think you may need emergency care. For example, call if:    · You passed out (lost consciousness).     · You have chest pain, are short of breath, or cough up blood.    Call your doctor now or seek immediate medical care if:    · You have pain that does not get better after you take pain medicine.     · You have new or more blood clots in your urine. (It is normal for the urine to be pink for a few days.)     · You cannot urinate.     · You have symptoms of a urinary tract infection. These may include:  ? Pain or burning when you urinate. ? A frequent need to urinate without being able to pass much urine. ? Pain in the flank, which is just below the rib cage and above the waist on either side of the back. ? Blood in the urine.   ? A fever.     · You are sick to your stomach or cannot drink fluids.     · You have signs of a blood clot in your leg (called a deep vein thrombosis), such as:  ? Pain in the calf, back of the knee, thigh, or groin. ? Redness and swelling in your leg.    Watch closely for any changes in your health, and be sure to contact your doctor if you have any problems. Where can you learn more? Go to http://juju-bertha.info/. Enter B636 in the search box to learn more about \"Lithotripsy: What to Expect at Home. \"  Current as of: March 14, 2018  Content Version: 11.9  © 3930-6898 [employer]. Care instructions adapted under license by [employer] (which disclaims liability or warranty for this information). If you have questions about a medical condition or this instruction, always ask your healthcare professional. [address] any warranty or liability for your use of this information. PAIN  · Take pain medication as directed by your doctor. · Call your doctor if pain is NOT relieved by medication      AFTER ANESTHESIA   · For the first 24 hours: DO NOT Drive, Drink alcoholic beverages, or Make important decisions. · Be aware of dizziness following anesthesia and while taking pain medication. APPOINTMENT DATE/ TIME keep as arranged    YOUR DOCTOR'S PHONE NUMBER [phone]      DISCHARGE SUMMARY from Nurse    PATIENT INSTRUCTIONS:    After general anesthesia or intravenous sedation, for 24 hours or while taking prescription Narcotics:  · Limit your activities  · Do not drive and operate hazardous machinery  · Do not make important personal or business decisions  · Do  not drink alcoholic beverages  · If you have not urinated within 8 hours after discharge, please contact your surgeon on call. *  Please give a list of your current medications to your Primary Care Provider.     *  Please update this list whenever your medications are discontinued, doses are      changed, or new medications (including over-the-counter products) are added.    *  Please carry medication information at all times in case of emergency situations. These are general instructions for a healthy lifestyle:    No smoking/ No tobacco products/ Avoid exposure to second hand smoke    Surgeon General's Warning:  Quitting smoking now greatly reduces serious risk to your health. Obesity, smoking, and sedentary lifestyle greatly increases your risk for illness    A healthy diet, regular physical exercise & weight monitoring are important for maintaining a healthy lifestyle    You may be retaining fluid if you have a history of heart failure or if you experience any of the following symptoms:  Weight gain of 3 pounds or more overnight or 5 pounds in a week, increased swelling in our hands or feet or shortness of breath while lying flat in bed. Please call your doctor as soon as you notice any of these symptoms; do not wait until your next office visit. Recognize signs and symptoms of STROKE:    F-face looks uneven    A-arms unable to move or move unevenly    S-speech slurred or non-existent    T-time-call 911 as soon as signs and symptoms begin-DO NOT go       Back to bed or wait to see if you get better-TIME IS BRAIN.

## 2019-03-11 NOTE — INTERVAL H&P NOTE
H&P Update:  Shamika Sarkar was seen and examined. History and physical has been reviewed. The patient has been examined. There have been no significant clinical changes since the completion of the originally dated History and Physical.  Lungs are CTAB and CV is RRR.     Signed By: Thuy Gandhi MD     March 11, 2019 8:34 AM

## 2019-03-11 NOTE — ANESTHESIA POSTPROCEDURE EVALUATION
Procedure(s):  LEFT LITHOTRIPSY EXTRACORPOREAL SHOCKWAVE ESWL.     Anesthesia Post Evaluation      Multimodal analgesia: multimodal analgesia not used between 6 hours prior to anesthesia start to PACU discharge  Patient location during evaluation: PACU  Patient participation: complete - patient participated  Level of consciousness: awake  Pain management: adequate  Airway patency: patent  Anesthetic complications: no  Cardiovascular status: acceptable  Respiratory status: acceptable  Hydration status: acceptable  Post anesthesia nausea and vomiting:  none      Visit Vitals  /79   Pulse 99   Temp 37.1 °C (98.8 °F)   Resp 16   Wt 68 kg (150 lb)   SpO2 96%   BMI 26.57 kg/m²

## 2019-03-11 NOTE — PERIOP NOTES
Preoperative flank skin condition: DRY AND INTACT  Lead shield: Yes  Patient ear protection: Yes   Gel applied to patient's flank and Lithotripsy head: Yes   Starting power level: 7  Shock start time (first  fluoroscopy): 0929  Shock stop time (last lithotripsy shock):   Ending power level: 11  Total shocks: 3000  Total fluoroscopy time: 2:40  Postoperative flank skin condition: PINK AND INTACT

## 2019-03-12 ENCOUNTER — HOSPITAL ENCOUNTER (EMERGENCY)
Age: 44
Discharge: HOME | End: 2019-03-12
Attending: EMERGENCY MEDICINE
Payer: MEDICAID

## 2019-03-12 VITALS
TEMPERATURE: 98.5 F | BODY MASS INDEX: 26.58 KG/M2 | RESPIRATION RATE: 51 BRPM | DIASTOLIC BLOOD PRESSURE: 66 MMHG | WEIGHT: 150 LBS | SYSTOLIC BLOOD PRESSURE: 123 MMHG | HEIGHT: 63 IN | OXYGEN SATURATION: 97 % | HEART RATE: 96 BPM

## 2019-03-12 DIAGNOSIS — R00.2 PALPITATIONS: Primary | ICD-10-CM

## 2019-03-12 LAB
ATRIAL RATE: 103 BPM
CALCULATED P AXIS, ECG09: 77 DEGREES
CALCULATED R AXIS, ECG10: 76 DEGREES
CALCULATED T AXIS, ECG11: 60 DEGREES
DIAGNOSIS, 93000: NORMAL
MAGNESIUM SERPL-MCNC: 1.8 MG/DL (ref 1.8–2.4)
P-R INTERVAL, ECG05: 134 MS
Q-T INTERVAL, ECG07: 350 MS
QRS DURATION, ECG06: 88 MS
QTC CALCULATION (BEZET), ECG08: 458 MS
TSH SERPL DL<=0.005 MIU/L-ACNC: 0.51 UIU/ML (ref 0.36–3.74)
VENTRICULAR RATE, ECG03: 103 BPM

## 2019-03-12 NOTE — ED PROVIDER NOTES
78-year-old female had lithotripsy this morning at 10 AM.  Thao notes some palpitations about noon. Described as pounding and fast heart rate and occasional skips. Has a history of SVT and takes half of atenolol daily. She took a second atenolol and 6 PM it did not seem to improve and thus came here. Mild chest pain. No shortness of breath URI symptoms. No lightheadedness or syncope. Has history of PSVT with ablation in [place] years ago. The history is provided by the patient. Palpitations    This is a new problem. The current episode started 6 to 12 hours ago. The problem has not changed since onset. The problem occurs hourly. The problem is associated with nothing. Associated symptoms include chest pain and irregular heartbeat. Pertinent negatives include no diaphoresis, no fever, no numbness, no chest pressure, no abdominal pain, no nausea, no vomiting, no back pain, no leg pain, no lower extremity edema, no dizziness and no shortness of breath. Risk factors include no risk factors. She has tried nothing for the symptoms. Her past medical history does not include hyperthyroidism, anemia, heart disease or DM.         Past Medical History:   Diagnosis Date    Anxiety     managed with medication     Difficult intubation     12/21/16 sore throat and ulcers after intubation     Essential hypertension 11/04/2016    only when septic in 10/2016, takes atenolol for HR    GERD (gastroesophageal reflux disease)     manage with medication    Hiatal hernia     History of kidney stones     numerous    Kidney stone     left side    Nausea & vomiting     nausea after anesthesia    Paroxysmal SVT (supraventricular tachycardia) ([address]     ablation- [place]       Past Surgical History:   Procedure Laterality Date    CARDIAC SURG PROCEDURE UNLIST  2003    SVT ablation    CLOSE CYSTOSTOMY      for kidney stones    HX BLADDER SUSPENSION  04/2016    HX LITHOTRIPSY      HX SVT ABLATION  2003    HX TUBAL LIGATION  2006    HX UROLOGICAL  1/2017, 9/2017, 9/2017    cysto x 3     HX UROLOGICAL  02/2019    stent placement    HX UROLOGICAL  2015    bladder sling    IR NEPHROURETERAL PERC RT PLC CATH NEW ACCESS  SI  2/13/2019         Family History:   Problem Relation Age of Onset    Heart Disease Mother         congenital heart block with lalo    Diabetes Father     Hypertension Father     Cancer Father         melanoma (face)     No Known Problems Sister     No Known Problems Brother        Social History     Socioeconomic History    Marital status:      Spouse name: Not on file    Number of children: Not on file    Years of education: Not on file    Highest education level: Not on file   Social Needs    Financial resource strain: Not on file    Food insecurity - worry: Not on file    Food insecurity - inability: Not on file   [employer] needs - medical: Not on file   [employer] needs - non-medical: Not on file   Occupational History    Not on file   Tobacco Use    Smoking status: Never Smoker    Smokeless tobacco: Never Used   Substance and Sexual Activity    Alcohol use: No    Drug use: No    Sexual activity: Not on file   Other Topics Concern    Not on file   Social History Narrative    Not on file         ALLERGIES: Ciprofloxacin    Review of Systems   Constitutional: Negative for diaphoresis and fever. Respiratory: Negative for shortness of breath. Cardiovascular: Positive for chest pain and palpitations. Gastrointestinal: Negative for abdominal pain, nausea and vomiting. Musculoskeletal: Negative for back pain. Skin: Negative for color change. Neurological: Negative for dizziness and numbness. Vitals:    03/11/19 2200   BP: (!) 143/92   Pulse: (!) 105   Resp: 16   Temp: 98.5 °F (36.9 °C)   SpO2: 98%   Weight: 68 kg (150 lb)   Height: 5' 3\" (1.6 m)            Physical Exam   Constitutional: She is oriented to person, place, and time.  She appears well-developed and well-nourished. No distress. HENT:   Head: Normocephalic and atraumatic. Right Ear: External ear normal.   Left Ear: External ear normal.   Mouth/Throat: Oropharynx is clear and moist. No oropharyngeal exudate. Eyes: Conjunctivae and EOM are normal. Pupils are equal, round, and reactive to light. Neck: Normal range of motion. Neck supple. Cardiovascular: Regular rhythm and intact distal pulses. Tachycardia present. No murmur heard. Pulmonary/Chest: Breath sounds normal. No respiratory distress. Abdominal: There is no tenderness. Neurological: She is alert and oriented to person, place, and time. Gait normal.   Nl speech   Skin: Skin is warm and dry. Psychiatric: She has a normal mood and affect. Her speech is normal.   Nursing note and vitals reviewed. MDM  Number of Diagnoses or Management Options  Diagnosis management comments: Check electrolytes. Cardiac monitoring. Amount and/or Complexity of Data Reviewed  Clinical lab tests: ordered and reviewed  Tests in the medicine section of CPT®: ordered and reviewed  Independent visualization of images, tracings, or specimens: yes (EKG reveals sinus tachycardia 105.   No ST-T changes or ectopy.)    Risk of Complications, Morbidity, and/or Mortality  Presenting problems: moderate  Diagnostic procedures: minimal    Patient Progress  Patient progress: stable         Procedures           Results Include:    Recent Results (from the past 24 hour(s))   CBC W/O DIFF    Collection Time: 03/11/19  8:53 AM   Result Value Ref Range    WBC 7.8 4.3 - 11.1 K/uL    RBC 4.39 4.05 - 5.2 M/uL    HGB 13.1 11.7 - 15.4 g/dL    HCT 38.9 35.8 - 46.3 %    MCV 88.6 79.6 - 97.8 FL    MCH 29.8 26.1 - 32.9 PG    MCHC 33.7 31.4 - 35.0 g/dL    RDW 12.0 11.9 - 14.6 %    PLATELET 053 779 - 143 K/uL    MPV 10.5 9.4 - 12.3 FL    ABSOLUTE NRBC 0.00 0.0 - 0.2 K/uL   METABOLIC PANEL, BASIC    Collection Time: 03/11/19  8:53 AM   Result Value Ref Range Sodium 138 136 - 145 mmol/L    Potassium 3.9 3.5 - 5.1 mmol/L    Chloride 106 98 - 107 mmol/L    CO2 26 21 - 32 mmol/L    Anion gap 6 (L) 7 - 16 mmol/L    Glucose 88 65 - 100 mg/dL    BUN 19 6 - 23 MG/DL    Creatinine 0.80 0.6 - 1.0 MG/DL    GFR est AA >60 >60 ml/min/1.73m2    GFR est non-AA >60 >60 ml/min/1.73m2    Calcium 9.2 8.3 - 10.4 MG/DL   POC SODIUM-POTASSIUM    Collection Time: 03/11/19  8:54 AM   Result Value Ref Range    Potassium, POC 4.7 3.5 - 5.1 MMOL/L   EKG, 12 LEAD, INITIAL    Collection Time: 03/11/19 10:05 PM   Result Value Ref Range    Ventricular Rate 103 BPM    Atrial Rate 103 BPM    P-R Interval 134 ms    QRS Duration 88 ms    Q-T Interval 350 ms    QTC Calculation (Bezet) 458 ms    Calculated P Axis 77 degrees    Calculated R Axis 76 degrees    Calculated T Axis 60 degrees    Diagnosis       Sinus tachycardia  Otherwise normal ECG  When compared with ECG of 27-OCT-2016 18:26,  Incomplete right bundle branch block is no longer Present     CBC WITH AUTOMATED DIFF    Collection Time: 03/11/19 10:17 PM   Result Value Ref Range    WBC 9.9 4.3 - 11.1 K/uL    RBC 4.25 4.05 - 5.2 M/uL    HGB 12.8 11.7 - 15.4 g/dL    HCT 37.6 35.8 - 46.3 %    MCV 88.5 79.6 - 97.8 FL    MCH 30.1 26.1 - 32.9 PG    MCHC 34.0 31.4 - 35.0 g/dL    RDW 12.0 11.9 - 14.6 %    PLATELET 932 559 - 571 K/uL    MPV 10.5 9.4 - 12.3 FL    ABSOLUTE NRBC 0.00 0.0 - 0.2 K/uL    DF AUTOMATED      NEUTROPHILS 86 (H) 43 - 78 %    LYMPHOCYTES 8 (L) 13 - 44 %    MONOCYTES 6 4.0 - 12.0 %    EOSINOPHILS 0 (L) 0.5 - 7.8 %    BASOPHILS 0 0.0 - 2.0 %    IMMATURE GRANULOCYTES 0 0.0 - 5.0 %    ABS. NEUTROPHILS 8.5 (H) 1.7 - 8.2 K/UL    ABS. LYMPHOCYTES 0.8 0.5 - 4.6 K/UL    ABS. MONOCYTES 0.5 0.1 - 1.3 K/UL    ABS. EOSINOPHILS 0.0 0.0 - 0.8 K/UL    ABS. BASOPHILS 0.0 0.0 - 0.2 K/UL    ABS. IMM.  GRANS. 0.0 0.0 - 0.5 K/UL   METABOLIC PANEL, COMPREHENSIVE    Collection Time: 03/11/19 10:17 PM   Result Value Ref Range    Sodium 139 136 - 145 mmol/L Potassium 3.7 3.5 - 5.1 mmol/L    Chloride 106 98 - 107 mmol/L    CO2 23 21 - 32 mmol/L    Anion gap 10 7 - 16 mmol/L    Glucose 131 (H) 65 - 100 mg/dL    BUN 12 6 - 23 MG/DL    Creatinine 0.91 0.6 - 1.0 MG/DL    GFR est AA >60 >60 ml/min/1.73m2    GFR est non-AA >60 >60 ml/min/1.73m2    Calcium 9.3 8.3 - 10.4 MG/DL    Bilirubin, total 0.3 0.2 - 1.1 MG/DL    ALT (SGPT) 18 12 - 65 U/L    AST (SGOT) 11 (L) 15 - 37 U/L    Alk. phosphatase 108 50 - 136 U/L    Protein, total 7.7 6.3 - 8.2 g/dL    Albumin 4.0 3.5 - 5.0 g/dL    Globulin 3.7 (H) 2.3 - 3.5 g/dL    A-G Ratio 1.1 (L) 1.2 - 3.5     POC TROPONIN-I    Collection Time: 03/11/19 10:17 PM   Result Value Ref Range    Troponin-I (POC) 0.01 (L) 0.02 - 0.05 ng/ml         1:05 AM  Resting without symptoms. Heart rate 95. We'll leave message with cardiology for follow-up.

## 2019-03-12 NOTE — ED NOTES
I have reviewed discharge instructions with the patient. The patient verbalized understanding. Patient left ED via Discharge Method: ambulatory to Home with   Opportunity for questions and clarification provided. Patient given 0 scripts. Pt dennies having palpitations at discharge. Pt in no acute distress in discharge. To continue your aftercare when you leave the hospital, you may receive an automated call from our care team to check in on how you are doing. This is a free service and part of our promise to provide the best care and service to meet your aftercare needs.  If you have questions, or wish to unsubscribe from this service please call [phone]. Thank you for Choosing our [facility].

## 2019-03-12 NOTE — ED TRIAGE NOTES
Patient arrives pov complaining of heart palpitations. Patient reports hx SVT. Patient reports left sided lithotripsy this am.  Patient denies flank pain. Patient reports \"I could feel my heart flying\" tonight, called NP and was told to come to ER. Patient reports hx ablation. Patient reports taking atenolol 12.5 this am prior to lithotripsy, and took 12.5mg tonight. Patient alert and oriented x4, ambulatory without difficulty.

## 2019-03-12 NOTE — DISCHARGE INSTRUCTIONS
Continue current medications. Call cardiology office by 10:00 tomorrow if you Do not hear from them for follow-up appointment. Patient Education        Palpitations: Care Instructions  Your Care Instructions    Heart palpitations are the uncomfortable sensation that your heart is beating fast or irregularly. You might feel pounding or fluttering in your chest. It might feel like your heart is skipping a beat. Although palpitations may be caused by a heart problem, they also occur because of stress, fatigue, or use of alcohol, caffeine, or nicotine. Many medicines, including diet pills, antihistamines, decongestants, and some herbal products, can cause heart palpitations. Nearly everyone has palpitations from time to time. Depending on your symptoms, your doctor may need to do more tests to try to find the cause of your palpitations. Follow-up care is a key part of your treatment and safety. Be sure to make and go to all appointments, and call your doctor if you are having problems. It's also a good idea to know your test results and keep a list of the medicines you take. How can you care for yourself at home? · Avoid caffeine, nicotine, and excess alcohol. · Do not take illegal drugs, such as methamphetamines and cocaine. · Do not take weight loss or diet medicines unless you talk with your doctor first.  · Get plenty of sleep. · Do not overeat. · If you have palpitations again, take deep breaths and try to relax. This may slow a racing heart. · If you start to feel lightheaded, lie down to avoid injuries that might result if you pass out and fall down. · Keep a record of your palpitations and bring it to your next doctor's appointment. Write down:  ? The date and time. ? Your pulse. (If your heart is beating fast, it may be hard to count your pulse.)  ? What you were doing when the palpitations started. ? How long the palpitations lasted. ? Any other symptoms.   · If an activity causes palpitations, slow down or stop. Talk to your doctor before you do that activity again. · Take your medicines exactly as prescribed. Call your doctor if you think you are having a problem with your medicine. When should you call for help? Call 911 anytime you think you may need emergency care. For example, call if:    · You passed out (lost consciousness).     · You have symptoms of a heart attack. These may include:  ? Chest pain or pressure, or a strange feeling in the chest.  ? Sweating. ? Shortness of breath. ? Pain, pressure, or a strange feeling in the back, neck, jaw, or upper belly or in one or both shoulders or arms. ? Lightheadedness or sudden weakness. ? A fast or irregular heartbeat. After you call 911, the  may tell you to chew 1 adult-strength or 2 to 4 low-dose aspirin. Wait for an ambulance. Do not try to drive yourself.     · You have symptoms of a stroke. These may include:  ? Sudden numbness, tingling, weakness, or loss of movement in your face, arm, or leg, especially on only one side of your body. ? Sudden vision changes. ? Sudden trouble speaking. ? Sudden confusion or trouble understanding simple statements. ? Sudden problems with walking or balance. ? A sudden, severe headache that is different from past headaches.    Call your doctor now or seek immediate medical care if:    · You have heart palpitations and:  ? Are dizzy or lightheaded, or you feel like you may faint. ? Have new or increased shortness of breath.    Watch closely for changes in your health, and be sure to contact your doctor if:    · You continue to have heart palpitations. Where can you learn more? Go to http://juju-bertha.info/. Enter R508 in the search box to learn more about \"Palpitations: Care Instructions. \"  Current as of: July 22, 2018  Content Version: 11.9  © 4655-9176 [employer].  Care instructions adapted under license by [employer] (which disclaims liability or warranty for this information). If you have questions about a medical condition or this instruction, always ask your healthcare professional. [address] any warranty or liability for your use of this information.

## 2019-03-25 PROBLEM — R00.2 PALPITATIONS: Status: ACTIVE | Noted: 2019-03-25

## 2021-04-02 NOTE — OP NOTES
Operative Note                Patient: Mihai Strauss [id number]    Date of Surgery: 03/11/19    Preoperative Diagnosis: 6 mm LLP renal stone    Postoperative Diagnosis:  same    Surgeon(s) and Role:     * Patricia Chatman MD - Primary     Anesthesia:  General     Procedure: Procedure(s):  LEFT EXTRACORPORAL SHOCKWAVE LITHOTRIPSY (ESWL)     Indications:     Discussed the risk of surgery including infection, hematoma, bleeding, recurrence or persistence of symptoms or stone, and the risks of general anesthetic. The patient understands the risks, any and all questions were answered to the patient's satisfaction and they freely signed the consent for operation. Procedure in Detail:  Patient was taken to the lithotripsy suite. Anesthesia was induced via the anesthesiology service. Using flouroscopy for guidance, a total of 3000 shocks were delivered in a non-gaited fashion. No cardiac ectopy was experienced. Shock rate was begun at 60 shocks per minute and then increased to 90 shocks per minute. Energy level was begun at 7 and gradually increased to a maximum of 11. Findings: Patient tolerated the procedure well. At the end of the procedure, the stone appeared to have fractured.       Estimated Blood Loss:  none    Specimens: * No specimens in log *             Drains: none                 Implants: * No implants in log *           Signed By: Mohsen Jiménez MD Attending Attestation (For Attendings USE Only)...

## 2022-03-19 PROBLEM — N20.0 RIGHT NEPHROLITHIASIS: Status: ACTIVE | Noted: 2019-02-13

## 2022-03-19 PROBLEM — N20.1 LEFT URETERAL STONE: Status: ACTIVE | Noted: 2017-09-13

## 2022-03-20 PROBLEM — R00.2 PALPITATIONS: Status: ACTIVE | Noted: 2019-03-25

## 2022-04-03 ENCOUNTER — APPOINTMENT (OUTPATIENT)
Dept: GENERAL RADIOLOGY | Age: 47
End: 2022-04-03
Attending: PHYSICIAN ASSISTANT
Payer: MEDICAID

## 2022-04-03 ENCOUNTER — HOSPITAL ENCOUNTER (EMERGENCY)
Age: 47
Discharge: HOME | End: 2022-04-03
Attending: STUDENT IN AN ORGANIZED HEALTH CARE EDUCATION/TRAINING PROGRAM
Payer: MEDICAID

## 2022-04-03 VITALS
RESPIRATION RATE: 20 BRPM | OXYGEN SATURATION: 99 % | HEIGHT: 63 IN | TEMPERATURE: 101 F | BODY MASS INDEX: 24.45 KG/M2 | SYSTOLIC BLOOD PRESSURE: 126 MMHG | DIASTOLIC BLOOD PRESSURE: 80 MMHG | WEIGHT: 138 LBS | HEART RATE: 130 BPM

## 2022-04-03 DIAGNOSIS — R50.9 FEVER OF UNKNOWN ORIGIN: Primary | ICD-10-CM

## 2022-04-03 LAB
ALBUMIN SERPL-MCNC: 3.9 G/DL (ref 3.5–5)
ALBUMIN/GLOB SERPL: 1.1 {RATIO} (ref 1.2–3.5)
ALP SERPL-CCNC: 123 U/L (ref 50–136)
ALT SERPL-CCNC: 34 U/L (ref 12–65)
ANION GAP SERPL CALC-SCNC: 6 MMOL/L (ref 7–16)
AST SERPL-CCNC: 18 U/L (ref 15–37)
B PERT DNA SPEC QL NAA+PROBE: NOT DETECTED
BACTERIA URNS QL MICRO: 0 /HPF
BASOPHILS # BLD: 0 K/UL (ref 0–0.2)
BASOPHILS NFR BLD: 1 % (ref 0–2)
BILIRUB SERPL-MCNC: 0.3 MG/DL (ref 0.2–1.1)
BORDETELLA PARAPERTUSSIS PCR, BORPAR: NOT DETECTED
BUN SERPL-MCNC: 18 MG/DL (ref 6–23)
C PNEUM DNA SPEC QL NAA+PROBE: NOT DETECTED
CALCIUM SERPL-MCNC: 9.6 MG/DL (ref 8.3–10.4)
CASTS URNS QL MICRO: 0 /LPF
CHLORIDE SERPL-SCNC: 106 MMOL/L (ref 98–107)
CO2 SERPL-SCNC: 29 MMOL/L (ref 21–32)
COVID-19 RAPID TEST, COVR: NOT DETECTED
CREAT SERPL-MCNC: 1 MG/DL (ref 0.6–1)
DIFFERENTIAL METHOD BLD: ABNORMAL
EOSINOPHIL # BLD: 0.1 K/UL (ref 0–0.8)
EOSINOPHIL NFR BLD: 3 % (ref 0.5–7.8)
EPI CELLS #/AREA URNS HPF: 0 /HPF
ERYTHROCYTE [DISTWIDTH] IN BLOOD BY AUTOMATED COUNT: 12.4 % (ref 11.9–14.6)
FLUAV AG NPH QL IA: NEGATIVE
FLUAV SUBTYP SPEC NAA+PROBE: NOT DETECTED
FLUBV AG NPH QL IA: NEGATIVE
FLUBV RNA SPEC QL NAA+PROBE: NOT DETECTED
GLOBULIN SER CALC-MCNC: 3.6 G/DL (ref 2.3–3.5)
GLUCOSE SERPL-MCNC: 108 MG/DL (ref 65–100)
HADV DNA SPEC QL NAA+PROBE: NOT DETECTED
HCOV 229E RNA SPEC QL NAA+PROBE: NOT DETECTED
HCOV HKU1 RNA SPEC QL NAA+PROBE: NOT DETECTED
HCOV NL63 RNA SPEC QL NAA+PROBE: NOT DETECTED
HCOV OC43 RNA SPEC QL NAA+PROBE: NOT DETECTED
HCT VFR BLD AUTO: 39 % (ref 35.8–46.3)
HGB BLD-MCNC: 13 G/DL (ref 11.7–15.4)
HMPV RNA SPEC QL NAA+PROBE: NOT DETECTED
HPIV1 RNA SPEC QL NAA+PROBE: NOT DETECTED
HPIV2 RNA SPEC QL NAA+PROBE: NOT DETECTED
HPIV3 RNA SPEC QL NAA+PROBE: NOT DETECTED
HPIV4 RNA SPEC QL NAA+PROBE: NOT DETECTED
IMM GRANULOCYTES # BLD AUTO: 0 K/UL (ref 0–0.5)
IMM GRANULOCYTES NFR BLD AUTO: 0 % (ref 0–5)
LACTATE SERPL-SCNC: 1.7 MMOL/L (ref 0.4–2)
LYMPHOCYTES # BLD: 0.4 K/UL (ref 0.5–4.6)
LYMPHOCYTES NFR BLD: 13 % (ref 13–44)
M PNEUMO DNA SPEC QL NAA+PROBE: NOT DETECTED
MCH RBC QN AUTO: 29.6 PG (ref 26.1–32.9)
MCHC RBC AUTO-ENTMCNC: 33.3 G/DL (ref 31.4–35)
MCV RBC AUTO: 88.8 FL (ref 79.6–97.8)
MONOCYTES # BLD: 0.3 K/UL (ref 0.1–1.3)
MONOCYTES NFR BLD: 12 % (ref 4–12)
NEUTS SEG # BLD: 1.9 K/UL (ref 1.7–8.2)
NEUTS SEG NFR BLD: 71 % (ref 43–78)
NRBC # BLD: 0 K/UL (ref 0–0.2)
PLATELET # BLD AUTO: 181 K/UL (ref 150–450)
PMV BLD AUTO: 10.3 FL (ref 9.4–12.3)
POTASSIUM SERPL-SCNC: 3.2 MMOL/L (ref 3.5–5.1)
PROCALCITONIN SERPL-MCNC: <0.05 NG/ML (ref 0–0.49)
PROT SERPL-MCNC: 7.5 G/DL (ref 6.3–8.2)
RBC # BLD AUTO: 4.39 M/UL (ref 4.05–5.2)
RBC #/AREA URNS HPF: 0 /HPF
RSV RNA SPEC QL NAA+PROBE: NOT DETECTED
RV+EV RNA SPEC QL NAA+PROBE: NOT DETECTED
SARS-COV-2 PCR, COVPCR: NOT DETECTED
SODIUM SERPL-SCNC: 141 MMOL/L (ref 136–145)
SOURCE, COVRS: NORMAL
SPECIMEN SOURCE: NORMAL
WBC # BLD AUTO: 2.7 K/UL (ref 4.3–11.1)
WBC URNS QL MICRO: 0 /HPF

## 2022-04-03 PROCEDURE — 87040 BLOOD CULTURE FOR BACTERIA: CPT

## 2022-04-03 PROCEDURE — 99285 EMERGENCY DEPT VISIT HI MDM: CPT

## 2022-04-03 PROCEDURE — 84145 PROCALCITONIN (PCT): CPT

## 2022-04-03 PROCEDURE — 81015 MICROSCOPIC EXAM OF URINE: CPT

## 2022-04-03 PROCEDURE — 0202U NFCT DS 22 TRGT SARS-COV-2: CPT

## 2022-04-03 PROCEDURE — 93005 ELECTROCARDIOGRAM TRACING: CPT | Performed by: PHYSICIAN ASSISTANT

## 2022-04-03 PROCEDURE — 85025 COMPLETE CBC W/AUTO DIFF WBC: CPT

## 2022-04-03 PROCEDURE — 74011250636 HC RX REV CODE- 250/636: Performed by: PHYSICIAN ASSISTANT

## 2022-04-03 PROCEDURE — 96361 HYDRATE IV INFUSION ADD-ON: CPT

## 2022-04-03 PROCEDURE — 74011000258 HC RX REV CODE- 258: Performed by: PHYSICIAN ASSISTANT

## 2022-04-03 PROCEDURE — 74011250636 HC RX REV CODE- 250/636: Performed by: STUDENT IN AN ORGANIZED HEALTH CARE EDUCATION/TRAINING PROGRAM

## 2022-04-03 PROCEDURE — 87635 SARS-COV-2 COVID-19 AMP PRB: CPT

## 2022-04-03 PROCEDURE — 80053 COMPREHEN METABOLIC PANEL: CPT

## 2022-04-03 PROCEDURE — 71045 X-RAY EXAM CHEST 1 VIEW: CPT

## 2022-04-03 PROCEDURE — 96374 THER/PROPH/DIAG INJ IV PUSH: CPT

## 2022-04-03 PROCEDURE — 87086 URINE CULTURE/COLONY COUNT: CPT

## 2022-04-03 PROCEDURE — 87804 INFLUENZA ASSAY W/OPTIC: CPT

## 2022-04-03 PROCEDURE — 74011250637 HC RX REV CODE- 250/637: Performed by: PHYSICIAN ASSISTANT

## 2022-04-03 PROCEDURE — 81003 URINALYSIS AUTO W/O SCOPE: CPT

## 2022-04-03 PROCEDURE — 83605 ASSAY OF LACTIC ACID: CPT

## 2022-04-03 RX ORDER — HYDROCODONE BITARTRATE AND ACETAMINOPHEN 5; 325 MG/1; MG/1
1 TABLET ORAL
Status: COMPLETED | OUTPATIENT
Start: 2022-04-03 | End: 2022-04-03

## 2022-04-03 RX ORDER — ACETAMINOPHEN 500 MG
1000 TABLET ORAL
Status: COMPLETED | OUTPATIENT
Start: 2022-04-03 | End: 2022-04-03

## 2022-04-03 RX ORDER — SODIUM CHLORIDE, SODIUM LACTATE, POTASSIUM CHLORIDE, CALCIUM CHLORIDE 600; 310; 30; 20 MG/100ML; MG/100ML; MG/100ML; MG/100ML
1000 INJECTION, SOLUTION INTRAVENOUS CONTINUOUS
Status: DISCONTINUED | OUTPATIENT
Start: 2022-04-03 | End: 2022-04-04 | Stop reason: HOSPADM

## 2022-04-03 RX ADMIN — CEFTRIAXONE 1 G: 1 INJECTION, POWDER, FOR SOLUTION INTRAMUSCULAR; INTRAVENOUS at 22:05

## 2022-04-03 RX ADMIN — HYDROCODONE BITARTRATE AND ACETAMINOPHEN 1 TABLET: 5; 325 TABLET ORAL at 22:22

## 2022-04-03 RX ADMIN — ACETAMINOPHEN 1000 MG: 500 TABLET, FILM COATED ORAL at 20:45

## 2022-04-03 RX ADMIN — SODIUM CHLORIDE, POTASSIUM CHLORIDE, SODIUM LACTATE AND CALCIUM CHLORIDE 1000 ML: 600; 310; 30; 20 INJECTION, SOLUTION INTRAVENOUS at 20:45

## 2022-04-04 LAB
ATRIAL RATE: 124 BPM
BILIRUB UR QL: NEGATIVE
CALCULATED P AXIS, ECG09: 76 DEGREES
CALCULATED R AXIS, ECG10: 73 DEGREES
CALCULATED T AXIS, ECG11: 66 DEGREES
DIAGNOSIS, 93000: NORMAL
GLUCOSE UR QL STRIP.AUTO: NEGATIVE MG/DL
KETONES UR-MCNC: NEGATIVE MG/DL
LEUKOCYTE ESTERASE UR QL STRIP: NEGATIVE
NITRITE UR QL: NEGATIVE
P-R INTERVAL, ECG05: 138 MS
PH UR: 6.5 [PH] (ref 5–9)
PROT UR QL: NEGATIVE MG/DL
Q-T INTERVAL, ECG07: 302 MS
QRS DURATION, ECG06: 92 MS
QTC CALCULATION (BEZET), ECG08: 433 MS
RBC # UR STRIP: NEGATIVE /UL
SP GR UR: >1.03 (ref 1–1.02)
UROBILINOGEN UR QL: 0.2 EU/DL (ref 0.2–1)
VENTRICULAR RATE, ECG03: 124 BPM

## 2022-04-04 NOTE — DISCHARGE INSTRUCTIONS
Your lab work and urine were unremarkable today. Prior to discharge we collected a respiratory virus panel swab. I will call you tomorrow with the results. Return here for worsening or worrisome symptoms.

## 2022-04-04 NOTE — ED PROVIDER NOTES
Patient is a 64-year-old female with past medical history significant for multiple UTIs, renal stones that led to sepsis presents today with chief complaint of fever and joint pain. She states typically when she has a fever she develops extreme pain however has been worse than usual.  She also endorsed that she is on a 3-month prescription of Macrobid prescribed by her urologist.  She did take a \"8-hour Tylenol\" this morning prior to arrival here. She states it is mostly her thumbs that hurt her she does have some knee pain as well. She denies any current urinary symptoms. There are no aggravating or alleviating factors for her pains. She denies any camping trips or hiking or other reason that she would otherwise be exposed to ticks. She denies any rashes. Denies chest pain, shortness of breath, nausea, vomiting, vaginal discharge, vaginal bleeding, hematuria or dysuria.            Past Medical History:   Diagnosis Date    Anxiety     managed with medication     BRCA gene mutation positive in female     Difficult intubation     12/21/16 sore throat and ulcers after intubation     Essential hypertension 11/04/2016    only when septic in 10/2016, takes atenolol for HR    GERD (gastroesophageal reflux disease)     manage with medication    Hiatal hernia     History of kidney stones     numerous    Kidney stone     left side    Nausea & vomiting     nausea after anesthesia    Paroxysmal SVT (supraventricular tachycardia) ([address]     [place]- [place]       Past Surgical History:   Procedure Laterality Date    CARDIAC SURG PROCEDURE UNLIST  2003    SVT ablation    CLOSE CYSTOSTOMY      for kidney stones    HX BLADDER SUSPENSION  04/2016    HX LITHOTRIPSY      HX SVT ABLATION  2003    HX TUBAL LIGATION  2006    HX UROLOGICAL  1/2017, 9/2017, 9/2017    cysto x 3     HX UROLOGICAL  02/2019    stent placement    HX UROLOGICAL  2015    bladder sling    IR NEPHROURETERAL PERC RT [facility] CATH NEW ACCESS  SI 2/13/2019         Family History:   Problem Relation Age of Onset    Heart Disease Mother         congenital heart block with lalo    Diabetes Father     Hypertension Father     Cancer Father         melanoma (face)     No Known Problems Sister     No Known Problems Brother        Social History     Socioeconomic History    Marital status:      Spouse name: Not on file    Number of children: Not on file    Years of education: Not on file    Highest education level: Not on file   Occupational History    Not on file   Tobacco Use    Smoking status: Never Smoker    Smokeless tobacco: Never Used   Substance and Sexual Activity    Alcohol use: No    Drug use: No    Sexual activity: Not on file   Other Topics Concern    Not on file   Social History Narrative    Not on file     Social Determinants of Health     Financial Resource Strain:     Difficulty of Paying Living Expenses: Not on file   Food Insecurity:     Worried About Running Out of Food in the Last Year: Not on file    Milean of Food in the Last Year: Not on file   Transportation Needs:     Lack of Transportation (Medical): Not on file    Lack of Transportation (Non-Medical):  Not on file   Physical Activity:     Days of Exercise per Week: Not on file    Minutes of Exercise per Session: Not on file   Stress:     Feeling of Stress : Not on file   Social Connections:     Frequency of Communication with Friends and Family: Not on file    Frequency of Social Gatherings with Friends and Family: Not on file    Attends [employer]ism Services: Not on file    Active Member of Clubs or Organizations: Not on file    Attends Club or Organization Meetings: Not on file    Marital Status: Not on file   Intimate Partner Violence:     Fear of Current or Ex-Partner: Not on file    Emotionally Abused: Not on file    Physically Abused: Not on file    Sexually Abused: Not on file   Housing Stability:     Unable to Pay for Housing in the Last Year: Not on file    Number of Places Lived in the Last Year: Not on file    Unstable Housing in the Last Year: Not on file         ALLERGIES: Ciprofloxacin    Review of Systems   Constitutional: Positive for chills and fever. Negative for activity change. HENT: Negative for ear pain and sore throat. Respiratory: Negative for cough. Gastrointestinal: Negative for nausea. Genitourinary: Negative for flank pain. Musculoskeletal: Positive for arthralgias and myalgias. Negative for joint swelling, neck pain and neck stiffness. Neurological: Negative for headaches. All other systems reviewed and are negative. Vitals:    04/03/22 2027   BP: (!) 161/95   Pulse: (!) 130   Resp: 20   Temp: (!) 101 °F (38.3 °C)   SpO2: 99%   Weight: 62.6 kg (138 lb)   Height: 5' 3\" (1.6 m)            Physical Exam  Vitals and nursing note reviewed. Constitutional:       General: She is not in acute distress. Appearance: Normal appearance. She is normal weight. She is not ill-appearing, toxic-appearing or diaphoretic. HENT:      Head: Normocephalic and atraumatic. Right Ear: Tympanic membrane, ear canal and external ear normal.      Left Ear: Tympanic membrane, ear canal and external ear normal.      Nose: Nose normal.      Mouth/Throat:      Mouth: Mucous membranes are moist.   Eyes:      Pupils: Pupils are equal, round, and reactive to light. Cardiovascular:      Rate and Rhythm: Tachycardia present. Pulmonary:      Effort: Pulmonary effort is normal. No respiratory distress. Abdominal:      General: Abdomen is flat. Palpations: Abdomen is soft. Tenderness: There is no abdominal tenderness. Neurological:      General: No focal deficit present. Mental Status: She is alert. Psychiatric:         Mood and Affect: Mood normal.          MDM  Number of Diagnoses or Management Options  Fever of unknown origin  Diagnosis management comments: Fever of unknown origin.   Patient presented to emergency room with some joint pain and fever. She states the joint pain is typical when she does get a fever. She was 101.0 in triage with a pulse rate of 130. Patient has history of urosepsis and septic renal stones so a septic work-up was initiated from triage by me personally. work-up today was essentially unremarkable. Rapid Covid and rapid flu were both negative. Her CBC is was unremarkable with the exception of a mildly decreased white blood cell count at 2.7. Renal function was normal.  Her abdomen was soft and nontender. Procalcitonin and lactate both within normal limits. At this time I see no dictation for a CT scan of her abdomen and pelvis. A respiratory virus panel was collected prior to patient's discharge that has now all resulted negative as well. She did have some mild congestion. She was discharged home with instructions to follow-up with her urologist.  She did have an appointment this week. As I have a shift within 24 hours I will call the patient tomorrow to check on her status. I discussed this with the patient and she is amenable to this. She will be on look out for my phone call tomorrow afternoon. Patient case, presentation, physical exam, lab findings, and radiologic work up was discussed with attending Dr. Gracia Care was used during the making of this note. This software is not perfect and grammatical and other typographical errors may be present. This note has been proofread, but may still contain errors.    Haydee Orozco PA-C        Amount and/or Complexity of Data Reviewed  Clinical lab tests: ordered and reviewed  Tests in the radiology section of CPT®: ordered and reviewed  Tests in the medicine section of CPT®: ordered    Risk of Complications, Morbidity, and/or Mortality  Presenting problems: high  Diagnostic procedures: moderate  Management options: moderate      ED Course as of 04/04/22 0022   Sun Apr 03, 2022 2228 Ashlie patient case with evaluating PA, patient has fever and joint aches, work-up in this department has been unremarkable. Urinalysis is clear however she is on active antibiotic therapy for previous UTI. We have sent urine for culture, obtain respiratory viral panel. Sepsis markers are negative. We will plan for discharge after a dose of Rocephin in this department and instructions for follow-up through urology [BR]      ED Course User Index  [BR] Martin Espinoza DO       EKG    Date/Time: 4/3/2022 9:09 PM  Performed by: CHINYERE Yin  Authorized by:  CHINYERE Yin     Previous ECG:     Previous ECG:  Unavailable  Interpretation:     Interpretation: normal    Rate:     ECG rate:  124    ECG rate assessment: normal    Rhythm:     Rhythm: sinus tachycardia    Ectopy:     Ectopy: PAC    ST segments:     ST segments:  Normal

## 2022-04-04 NOTE — ED NOTES
Pt given discharge instructions. Pt verbalized understanding. PIV removed, pt ambulatory to exit in stable condition with family escort.

## 2022-04-04 NOTE — ED TRIAGE NOTES
Pt c/o fever and joint pain. States on macrobid for current UTI (on 3 month course per Dr. Darshana Hilario). Symptoms started Friday night with fever coming on today. Took Tylenol at 0900.

## 2022-04-06 LAB
BACTERIA SPEC CULT: NORMAL
SERVICE CMNT-IMP: NORMAL

## 2022-04-08 LAB
BACTERIA SPEC CULT: NORMAL
SERVICE CMNT-IMP: NORMAL

## 2023-01-17 ENCOUNTER — HOSPITAL ENCOUNTER (INPATIENT)
Age: 48
LOS: 3 days | Discharge: HOME | DRG: 720 | End: 2023-01-20
Attending: EMERGENCY MEDICINE | Admitting: FAMILY MEDICINE
Payer: MEDICAID

## 2023-01-17 ENCOUNTER — APPOINTMENT (OUTPATIENT)
Dept: CT IMAGING | Age: 48
DRG: 720 | End: 2023-01-17
Payer: MEDICAID

## 2023-01-17 DIAGNOSIS — A41.9 SEPTICEMIA (HCC): ICD-10-CM

## 2023-01-17 DIAGNOSIS — N10 ACUTE PYELONEPHRITIS: Primary | ICD-10-CM

## 2023-01-17 PROBLEM — K12.31 MUCOSITIS DUE TO CHEMOTHERAPY: Status: RESOLVED | Noted: 2019-09-08 | Resolved: 2023-01-17

## 2023-01-17 PROBLEM — N20.0 BILATERAL NEPHROLITHIASIS: Status: ACTIVE | Noted: 2019-02-13

## 2023-01-17 PROBLEM — K44.9 HIATAL HERNIA: Status: ACTIVE | Noted: 2023-01-17

## 2023-01-17 PROBLEM — N20.1 LEFT URETERAL STONE: Status: RESOLVED | Noted: 2017-09-13 | Resolved: 2023-01-17

## 2023-01-17 PROBLEM — D50.8 IRON DEFICIENCY ANEMIA SECONDARY TO INADEQUATE DIETARY IRON INTAKE: Status: ACTIVE | Noted: 2020-09-01

## 2023-01-17 PROBLEM — Z15.02 BRCA1 GENE MUTATION POSITIVE IN FEMALE: Status: ACTIVE | Noted: 2019-03-25

## 2023-01-17 PROBLEM — F32.9 REACTIVE DEPRESSION: Status: ACTIVE | Noted: 2022-11-15

## 2023-01-17 PROBLEM — Z93.50 CYSTOSTOMY STATUS (HCC): Status: RESOLVED | Noted: 2023-01-17 | Resolved: 2023-01-17

## 2023-01-17 PROBLEM — K12.31 MUCOSITIS DUE TO CHEMOTHERAPY: Status: ACTIVE | Noted: 2019-09-08

## 2023-01-17 PROBLEM — Z15.01 BRCA1 GENE MUTATION POSITIVE IN FEMALE: Status: ACTIVE | Noted: 2019-03-25

## 2023-01-17 PROBLEM — Z15.09 BRCA1 GENE MUTATION POSITIVE IN FEMALE: Status: ACTIVE | Noted: 2019-03-25

## 2023-01-17 PROBLEM — K21.9 GASTROESOPHAGEAL REFLUX DISEASE: Status: ACTIVE | Noted: 2020-04-29

## 2023-01-17 PROBLEM — R50.81 NEUTROPENIC FEVER (HCC): Status: RESOLVED | Noted: 2019-09-08 | Resolved: 2023-01-17

## 2023-01-17 PROBLEM — Z93.50 CYSTOSTOMY STATUS (HCC): Status: ACTIVE | Noted: 2023-01-17

## 2023-01-17 PROBLEM — D70.9 NEUTROPENIC FEVER (HCC): Status: RESOLVED | Noted: 2019-09-08 | Resolved: 2023-01-17

## 2023-01-17 PROBLEM — N39.46 MIXED STRESS AND URGE URINARY INCONTINENCE: Status: ACTIVE | Noted: 2019-01-10

## 2023-01-17 PROBLEM — E89.41 HOT FLASHES DUE TO SURGICAL MENOPAUSE: Status: ACTIVE | Noted: 2023-01-17

## 2023-01-17 PROBLEM — R50.81 NEUTROPENIC FEVER (HCC): Status: ACTIVE | Noted: 2019-09-08

## 2023-01-17 PROBLEM — E78.2 MIXED HYPERLIPIDEMIA: Status: ACTIVE | Noted: 2020-09-01

## 2023-01-17 PROBLEM — F32.9 REACTIVE DEPRESSION: Status: RESOLVED | Noted: 2022-11-15 | Resolved: 2023-01-17

## 2023-01-17 PROBLEM — D70.9 NEUTROPENIC FEVER (HCC): Status: ACTIVE | Noted: 2019-09-08

## 2023-01-17 PROBLEM — C56.1 MALIGNANT NEOPLASM OF RIGHT OVARY (HCC): Status: ACTIVE | Noted: 2019-05-14

## 2023-01-17 PROBLEM — R00.2 PALPITATIONS: Status: RESOLVED | Noted: 2019-03-25 | Resolved: 2023-01-17

## 2023-01-17 PROBLEM — N39.0 SEPSIS SECONDARY TO UTI (HCC): Status: ACTIVE | Noted: 2023-01-17

## 2023-01-17 LAB
ALBUMIN SERPL-MCNC: 4.5 G/DL (ref 3.5–5)
ALBUMIN/GLOB SERPL: 1.3 (ref 0.4–1.6)
ALP SERPL-CCNC: 121 U/L (ref 50–136)
ALT SERPL-CCNC: 42 U/L (ref 12–65)
ANION GAP SERPL CALC-SCNC: 10 MMOL/L (ref 2–11)
APPEARANCE UR: ABNORMAL
AST SERPL-CCNC: 32 U/L (ref 15–37)
BACTERIA URNS QL MICRO: ABNORMAL /HPF
BASOPHILS # BLD: 0 K/UL (ref 0–0.2)
BASOPHILS NFR BLD: 0 % (ref 0–2)
BILIRUB SERPL-MCNC: 0.8 MG/DL (ref 0.2–1.1)
BILIRUB UR QL: NEGATIVE
BUN SERPL-MCNC: 16 MG/DL (ref 6–23)
CALCIUM SERPL-MCNC: 10.5 MG/DL (ref 8.3–10.4)
CASTS URNS QL MICRO: 0 /LPF
CHLORIDE SERPL-SCNC: 104 MMOL/L (ref 101–110)
CO2 SERPL-SCNC: 23 MMOL/L (ref 21–32)
COLOR UR: ABNORMAL
CREAT SERPL-MCNC: 1.1 MG/DL (ref 0.6–1)
CRYSTALS URNS QL MICRO: 0 /LPF
DIFFERENTIAL METHOD BLD: ABNORMAL
EOSINOPHIL # BLD: 0 K/UL (ref 0–0.8)
EOSINOPHIL NFR BLD: 0 % (ref 0.5–7.8)
EPI CELLS #/AREA URNS HPF: 0 /HPF
ERYTHROCYTE [DISTWIDTH] IN BLOOD BY AUTOMATED COUNT: 11.9 % (ref 11.9–14.6)
GLOBULIN SER CALC-MCNC: 3.4 G/DL (ref 2.8–4.5)
GLUCOSE SERPL-MCNC: 104 MG/DL (ref 65–100)
GLUCOSE UR STRIP.AUTO-MCNC: NEGATIVE MG/DL
HCT VFR BLD AUTO: 42.2 % (ref 35.8–46.3)
HGB BLD-MCNC: 14.5 G/DL (ref 11.7–15.4)
HGB UR QL STRIP: ABNORMAL
IMM GRANULOCYTES # BLD AUTO: 0 K/UL (ref 0–0.5)
IMM GRANULOCYTES NFR BLD AUTO: 0 % (ref 0–5)
KETONES UR QL STRIP.AUTO: NEGATIVE MG/DL
LACTATE SERPL-SCNC: 0.9 MMOL/L (ref 0.4–2)
LACTATE SERPL-SCNC: 0.9 MMOL/L (ref 0.4–2)
LEUKOCYTE ESTERASE UR QL STRIP.AUTO: ABNORMAL
LYMPHOCYTES # BLD: 0.3 K/UL (ref 0.5–4.6)
LYMPHOCYTES NFR BLD: 3 % (ref 13–44)
MAGNESIUM SERPL-MCNC: 1.6 MG/DL (ref 1.8–2.4)
MCH RBC QN AUTO: 30.4 PG (ref 26.1–32.9)
MCHC RBC AUTO-ENTMCNC: 34.4 G/DL (ref 31.4–35)
MCV RBC AUTO: 88.5 FL (ref 82–102)
MONOCYTES # BLD: 0.4 K/UL (ref 0.1–1.3)
MONOCYTES NFR BLD: 4 % (ref 4–12)
MUCOUS THREADS URNS QL MICRO: 0 /LPF
NEUTS SEG # BLD: 9.6 K/UL (ref 1.7–8.2)
NEUTS SEG NFR BLD: 93 % (ref 43–78)
NITRITE UR QL STRIP.AUTO: POSITIVE
NRBC # BLD: 0 K/UL (ref 0–0.2)
OTHER OBSERVATIONS: ABNORMAL
PH UR STRIP: 6.5 (ref 5–9)
PLATELET # BLD AUTO: 171 K/UL (ref 150–450)
PMV BLD AUTO: 9.9 FL (ref 9.4–12.3)
POTASSIUM SERPL-SCNC: 3.5 MMOL/L (ref 3.5–5.1)
PROT SERPL-MCNC: 7.9 G/DL (ref 6.3–8.2)
PROT UR STRIP-MCNC: 100 MG/DL
RBC # BLD AUTO: 4.77 M/UL (ref 4.05–5.2)
RBC #/AREA URNS HPF: ABNORMAL /HPF
SODIUM SERPL-SCNC: 137 MMOL/L (ref 133–143)
SP GR UR REFRACTOMETRY: 1.01 (ref 1–1.02)
URINE CULTURE IF INDICATED: ABNORMAL
UROBILINOGEN UR QL STRIP.AUTO: 0.2 EU/DL (ref 0.2–1)
WBC # BLD AUTO: 10.3 K/UL (ref 4.3–11.1)
WBC URNS QL MICRO: ABNORMAL /HPF

## 2023-01-17 PROCEDURE — 6360000002 HC RX W HCPCS

## 2023-01-17 PROCEDURE — 1100000000 HC RM PRIVATE

## 2023-01-17 PROCEDURE — 2580000003 HC RX 258: Performed by: FAMILY MEDICINE

## 2023-01-17 PROCEDURE — 96365 THER/PROPH/DIAG IV INF INIT: CPT

## 2023-01-17 PROCEDURE — 6360000002 HC RX W HCPCS: Performed by: EMERGENCY MEDICINE

## 2023-01-17 PROCEDURE — 96375 TX/PRO/DX INJ NEW DRUG ADDON: CPT

## 2023-01-17 PROCEDURE — 87150 DNA/RNA AMPLIFIED PROBE: CPT

## 2023-01-17 PROCEDURE — 83605 ASSAY OF LACTIC ACID: CPT

## 2023-01-17 PROCEDURE — 96372 THER/PROPH/DIAG INJ SC/IM: CPT

## 2023-01-17 PROCEDURE — 87040 BLOOD CULTURE FOR BACTERIA: CPT

## 2023-01-17 PROCEDURE — 81001 URINALYSIS AUTO W/SCOPE: CPT

## 2023-01-17 PROCEDURE — 99285 EMERGENCY DEPT VISIT HI MDM: CPT

## 2023-01-17 PROCEDURE — 6370000000 HC RX 637 (ALT 250 FOR IP): Performed by: FAMILY MEDICINE

## 2023-01-17 PROCEDURE — 2580000003 HC RX 258: Performed by: EMERGENCY MEDICINE

## 2023-01-17 PROCEDURE — 83735 ASSAY OF MAGNESIUM: CPT

## 2023-01-17 PROCEDURE — 74176 CT ABD & PELVIS W/O CONTRAST: CPT

## 2023-01-17 PROCEDURE — 87088 URINE BACTERIA CULTURE: CPT

## 2023-01-17 PROCEDURE — 87205 SMEAR GRAM STAIN: CPT

## 2023-01-17 PROCEDURE — 85025 COMPLETE CBC W/AUTO DIFF WBC: CPT

## 2023-01-17 PROCEDURE — 87086 URINE CULTURE/COLONY COUNT: CPT

## 2023-01-17 PROCEDURE — 36415 COLL VENOUS BLD VENIPUNCTURE: CPT

## 2023-01-17 PROCEDURE — 6370000000 HC RX 637 (ALT 250 FOR IP): Performed by: EMERGENCY MEDICINE

## 2023-01-17 PROCEDURE — 6360000002 HC RX W HCPCS: Performed by: FAMILY MEDICINE

## 2023-01-17 PROCEDURE — 80053 COMPREHEN METABOLIC PANEL: CPT

## 2023-01-17 PROCEDURE — 87077 CULTURE AEROBIC IDENTIFY: CPT

## 2023-01-17 PROCEDURE — 87186 SC STD MICRODIL/AGAR DIL: CPT

## 2023-01-17 RX ORDER — TAMSULOSIN HYDROCHLORIDE 0.4 MG/1
0.4 CAPSULE ORAL DAILY
Status: DISCONTINUED | OUTPATIENT
Start: 2023-01-17 | End: 2023-01-20 | Stop reason: HOSPADM

## 2023-01-17 RX ORDER — ONDANSETRON 2 MG/ML
4 INJECTION INTRAMUSCULAR; INTRAVENOUS ONCE
Status: COMPLETED | OUTPATIENT
Start: 2023-01-17 | End: 2023-01-17

## 2023-01-17 RX ORDER — SODIUM CHLORIDE 0.9 % (FLUSH) 0.9 %
5-40 SYRINGE (ML) INJECTION PRN
Status: DISCONTINUED | OUTPATIENT
Start: 2023-01-17 | End: 2023-01-20 | Stop reason: HOSPADM

## 2023-01-17 RX ORDER — ACETAMINOPHEN 325 MG/1
650 TABLET ORAL EVERY 6 HOURS PRN
Status: DISCONTINUED | OUTPATIENT
Start: 2023-01-17 | End: 2023-01-20 | Stop reason: HOSPADM

## 2023-01-17 RX ORDER — SODIUM CHLORIDE, SODIUM LACTATE, POTASSIUM CHLORIDE, CALCIUM CHLORIDE 600; 310; 30; 20 MG/100ML; MG/100ML; MG/100ML; MG/100ML
INJECTION, SOLUTION INTRAVENOUS CONTINUOUS
Status: DISCONTINUED | OUTPATIENT
Start: 2023-01-17 | End: 2023-01-20 | Stop reason: HOSPADM

## 2023-01-17 RX ORDER — KETOROLAC TROMETHAMINE 15 MG/ML
15 INJECTION, SOLUTION INTRAMUSCULAR; INTRAVENOUS ONCE
Status: COMPLETED | OUTPATIENT
Start: 2023-01-17 | End: 2023-01-17

## 2023-01-17 RX ORDER — NALOXONE HYDROCHLORIDE 0.4 MG/ML
0.4 INJECTION, SOLUTION INTRAMUSCULAR; INTRAVENOUS; SUBCUTANEOUS PRN
Status: DISCONTINUED | OUTPATIENT
Start: 2023-01-17 | End: 2023-01-20 | Stop reason: HOSPADM

## 2023-01-17 RX ORDER — BUPROPION HYDROCHLORIDE 150 MG/1
150 TABLET ORAL EVERY MORNING
COMMUNITY

## 2023-01-17 RX ORDER — ATENOLOL 50 MG/1
25 TABLET ORAL DAILY
Status: DISCONTINUED | OUTPATIENT
Start: 2023-01-18 | End: 2023-01-19

## 2023-01-17 RX ORDER — PROMETHAZINE HYDROCHLORIDE 25 MG/1
25 TABLET ORAL EVERY 6 HOURS PRN
Status: DISCONTINUED | OUTPATIENT
Start: 2023-01-17 | End: 2023-01-20 | Stop reason: HOSPADM

## 2023-01-17 RX ORDER — 0.9 % SODIUM CHLORIDE 0.9 %
30 INTRAVENOUS SOLUTION INTRAVENOUS ONCE
Status: COMPLETED | OUTPATIENT
Start: 2023-01-17 | End: 2023-01-17

## 2023-01-17 RX ORDER — MAGNESIUM SULFATE IN WATER 40 MG/ML
2000 INJECTION, SOLUTION INTRAVENOUS ONCE
Status: COMPLETED | OUTPATIENT
Start: 2023-01-17 | End: 2023-01-18

## 2023-01-17 RX ORDER — HYDROMORPHONE HYDROCHLORIDE 1 MG/ML
0.5 INJECTION, SOLUTION INTRAMUSCULAR; INTRAVENOUS; SUBCUTANEOUS
Status: DISCONTINUED | OUTPATIENT
Start: 2023-01-17 | End: 2023-01-17

## 2023-01-17 RX ORDER — ENOXAPARIN SODIUM 100 MG/ML
40 INJECTION SUBCUTANEOUS DAILY
Status: DISCONTINUED | OUTPATIENT
Start: 2023-01-18 | End: 2023-01-20 | Stop reason: HOSPADM

## 2023-01-17 RX ORDER — KETOROLAC TROMETHAMINE 30 MG/ML
15 INJECTION, SOLUTION INTRAMUSCULAR; INTRAVENOUS EVERY 6 HOURS PRN
Status: DISCONTINUED | OUTPATIENT
Start: 2023-01-17 | End: 2023-01-20 | Stop reason: HOSPADM

## 2023-01-17 RX ORDER — MAGNESIUM HYDROXIDE/ALUMINUM HYDROXICE/SIMETHICONE 120; 1200; 1200 MG/30ML; MG/30ML; MG/30ML
30 SUSPENSION ORAL EVERY 6 HOURS PRN
Status: DISCONTINUED | OUTPATIENT
Start: 2023-01-17 | End: 2023-01-20 | Stop reason: HOSPADM

## 2023-01-17 RX ORDER — BUPROPION HYDROCHLORIDE 150 MG/1
150 TABLET, EXTENDED RELEASE ORAL EVERY MORNING
Status: DISCONTINUED | OUTPATIENT
Start: 2023-01-18 | End: 2023-01-20 | Stop reason: HOSPADM

## 2023-01-17 RX ORDER — ESTRADIOL 0.5 MG/1
0.5 TABLET ORAL DAILY
COMMUNITY

## 2023-01-17 RX ORDER — SODIUM CHLORIDE 9 MG/ML
INJECTION, SOLUTION INTRAVENOUS PRN
Status: DISCONTINUED | OUTPATIENT
Start: 2023-01-17 | End: 2023-01-20 | Stop reason: HOSPADM

## 2023-01-17 RX ORDER — MORPHINE SULFATE 4 MG/ML
4 INJECTION, SOLUTION INTRAMUSCULAR; INTRAVENOUS
Status: COMPLETED | OUTPATIENT
Start: 2023-01-17 | End: 2023-01-17

## 2023-01-17 RX ORDER — ESTRADIOL 1 MG/1
0.5 TABLET ORAL DAILY
Status: DISCONTINUED | OUTPATIENT
Start: 2023-01-18 | End: 2023-01-20 | Stop reason: HOSPADM

## 2023-01-17 RX ORDER — SODIUM CHLORIDE 0.9 % (FLUSH) 0.9 %
5-40 SYRINGE (ML) INJECTION EVERY 12 HOURS SCHEDULED
Status: DISCONTINUED | OUTPATIENT
Start: 2023-01-17 | End: 2023-01-20 | Stop reason: HOSPADM

## 2023-01-17 RX ORDER — ONDANSETRON 2 MG/ML
4 INJECTION INTRAMUSCULAR; INTRAVENOUS EVERY 6 HOURS PRN
Status: DISCONTINUED | OUTPATIENT
Start: 2023-01-17 | End: 2023-01-20 | Stop reason: HOSPADM

## 2023-01-17 RX ORDER — POTASSIUM CITRATE 10 MEQ/1
10 TABLET, EXTENDED RELEASE ORAL 2 TIMES DAILY
Status: DISCONTINUED | OUTPATIENT
Start: 2023-01-17 | End: 2023-01-17

## 2023-01-17 RX ORDER — ACETAMINOPHEN 650 MG/1
650 SUPPOSITORY RECTAL EVERY 6 HOURS PRN
Status: DISCONTINUED | OUTPATIENT
Start: 2023-01-17 | End: 2023-01-20 | Stop reason: HOSPADM

## 2023-01-17 RX ORDER — FAMOTIDINE 20 MG/1
20 TABLET, FILM COATED ORAL 2 TIMES DAILY
Status: DISCONTINUED | OUTPATIENT
Start: 2023-01-17 | End: 2023-01-20 | Stop reason: HOSPADM

## 2023-01-17 RX ORDER — ACETAMINOPHEN 325 MG/1
650 TABLET ORAL
Status: COMPLETED | OUTPATIENT
Start: 2023-01-17 | End: 2023-01-17

## 2023-01-17 RX ADMIN — ONDANSETRON 4 MG: 2 INJECTION INTRAMUSCULAR; INTRAVENOUS at 14:27

## 2023-01-17 RX ADMIN — MORPHINE SULFATE 4 MG: 4 INJECTION INTRAVENOUS at 14:28

## 2023-01-17 RX ADMIN — TAMSULOSIN HYDROCHLORIDE 0.4 MG: 0.4 CAPSULE ORAL at 18:51

## 2023-01-17 RX ADMIN — KETOROLAC TROMETHAMINE 15 MG: 15 INJECTION, SOLUTION INTRAMUSCULAR; INTRAVENOUS at 14:27

## 2023-01-17 RX ADMIN — ACETAMINOPHEN 650 MG: 325 TABLET ORAL at 20:52

## 2023-01-17 RX ADMIN — SODIUM CHLORIDE 2000 ML: 9 INJECTION, SOLUTION INTRAVENOUS at 14:25

## 2023-01-17 RX ADMIN — MAGNESIUM SULFATE HEPTAHYDRATE 2000 MG: 40 INJECTION, SOLUTION INTRAVENOUS at 22:43

## 2023-01-17 RX ADMIN — SODIUM CHLORIDE, PRESERVATIVE FREE 10 ML: 5 INJECTION INTRAVENOUS at 21:37

## 2023-01-17 RX ADMIN — PIPERACILLIN AND TAZOBACTAM 4500 MG: 4; .5 INJECTION, POWDER, LYOPHILIZED, FOR SOLUTION INTRAVENOUS at 14:23

## 2023-01-17 RX ADMIN — SODIUM CHLORIDE, POTASSIUM CHLORIDE, SODIUM LACTATE AND CALCIUM CHLORIDE: 600; 310; 30; 20 INJECTION, SOLUTION INTRAVENOUS at 18:51

## 2023-01-17 RX ADMIN — HYDROMORPHONE HYDROCHLORIDE: 1 INJECTION, SOLUTION INTRAMUSCULAR; INTRAVENOUS; SUBCUTANEOUS at 18:42

## 2023-01-17 RX ADMIN — FAMOTIDINE 20 MG: 20 TABLET, FILM COATED ORAL at 20:52

## 2023-01-17 RX ADMIN — PIPERACILLIN AND TAZOBACTAM 3375 MG: 3; .375 INJECTION, POWDER, FOR SOLUTION INTRAVENOUS at 21:19

## 2023-01-17 RX ADMIN — ACETAMINOPHEN 650 MG: 325 TABLET ORAL at 14:27

## 2023-01-17 RX ADMIN — POTASSIUM BICARBONATE 40 MEQ: 782 TABLET, EFFERVESCENT ORAL at 18:51

## 2023-01-17 ASSESSMENT — PAIN DESCRIPTION - DESCRIPTORS
DESCRIPTORS: ACHING
DESCRIPTORS: SORE;SHARP

## 2023-01-17 ASSESSMENT — PAIN DESCRIPTION - ORIENTATION
ORIENTATION: RIGHT;LEFT
ORIENTATION: LEFT;RIGHT
ORIENTATION: MID

## 2023-01-17 ASSESSMENT — PAIN SCALES - GENERAL
PAINLEVEL_OUTOF10: 3
PAINLEVEL_OUTOF10: 8
PAINLEVEL_OUTOF10: 2
PAINLEVEL_OUTOF10: 5
PAINLEVEL_OUTOF10: 7

## 2023-01-17 ASSESSMENT — PAIN DESCRIPTION - LOCATION
LOCATION: FLANK
LOCATION: FLANK
LOCATION: HEAD
LOCATION: HEAD;FLANK

## 2023-01-17 ASSESSMENT — ENCOUNTER SYMPTOMS
VOMITING: 0
DIARRHEA: 0
COUGH: 0
NAUSEA: 1
ABDOMINAL PAIN: 1
SHORTNESS OF BREATH: 0
BACK PAIN: 0
SORE THROAT: 0

## 2023-01-17 ASSESSMENT — PAIN - FUNCTIONAL ASSESSMENT: PAIN_FUNCTIONAL_ASSESSMENT: ACTIVITIES ARE NOT PREVENTED

## 2023-01-17 NOTE — H&P
Hospitalist History and Physical   Admit Date:  2023  1:39 PM   Name:  Maryse Tejeda   Age:  50 y.o. Sex:  female  :  1975   MRN:  [id number]   Room:  [place] [address]    Presenting Complaint: Flank Pain     Reason(s) for Admission: Sepsis secondary to UTI ([facility] [A41.9, N39.0]     History of Present Illness:   Maryse Tejeda is a 50 y.o. female with medical history of nephrolithasis, UTIs, BRCA1 with ovarian cancer who presented with flank pain, chills, fever, nausea, dysuria. Treated for UTI on 22. This past  developed right sided flank pain associated with bladder spasms, malodorous urine increased urinary frequency. Kidney stone passed this AM the size of a mechanical pencil eraser. History of stones and UTIs x 25 years. Moved to [facility] in  then in  was septic from UTI and stones and again in 2017 with sepsis requiring PICC line for 2 weeks OP abx. She follows with , urologist. She reports stones run in her family. In the ED, she was febrile 102.9, , RR 22, BP 97/62. WBC 10.2 Calcium 10.5   UA +nitrate, large LE larege blood. CT renal stone protocol -   1. Bilateral left greater than right punctate nonobstructing renal calculi and   findings of medullary calcinosis without obstructing renal calculi or   hydronephrosis. She rec'd 30 cc/kg sepsis bolus, zosyn, toradol, morphine, zofran in the ED. Assessment & Plan:     Sepsis 2/2 acute pyelonephritis associated with non obstructing renal calculi bilaterally. Admit   Zosyn - history of enterococcus septicemia   IVF   Anti-emtics, pain control. FU Bcx and Ucx  Consult urology     pSVT - cont. 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 followed by Dr. Kari Villavicencio with [address] outpatient.  Cont estradiol     Mood d/o - resume wellbutrin         Anticipated discharge needs:         Diet: regular   VTE ppx: lovenox  Code status: Full     Hospital Problems:  Principal Problem:    Sepsis secondary to UTI [facility])  Active Problems:    Paroxysmal supraventricular tachycardia ([address]    Bilateral nephrolithiasis    Pyelonephritis, acute  Resolved Problems:    * No resolved hospital problems. *       Past History:     Past Medical History:   Diagnosis Date    Anxiety     managed with medication     BRCA gene mutation positive in female     Cystostomy status ([address] 1/17/2023    Difficult intubation     12/21/16 sore throat and ulcers after intubation     Essential hypertension 11/04/2016    only when septic in 10/2016, takes atenolol for HR    GERD (gastroesophageal reflux disease)     manage with medication    Gram positive septicemia ([address] 10/27/2016    Hiatal hernia     History of kidney stones     numerous    Kidney stone     left side    Left ureteral stone 9/13/2017    Midline cystocele 2/22/2016    Mucositis due to chemotherapy 9/8/2019    Formatting of this note might be different from the original. Sore on L lateral tongue consistent with mucositis from chemo. She had this with her prior cycle Chloraseptic spray ordered    Last Assessment & Plan:  Formatting of this note might be different from the original. Chloraseptic spray prn and orajel    Nausea & vomiting     nausea after anesthesia    Neutropenic fever ([address] 9/8/2019    Formatting of this note might be different from the original. - Patient s/p cycle 2 of carbo/taxol on 8/27 presenting with fever and , demonstrating severe neutropenia.  - Febrile to 101.4 in the ED. Lactate 1.3.  - CXR, UA, and influenza screening negative. No clear source of infection at this time. No localizing symptoms other than dry cough. Blood cultures pending.  Respiratory culture to    Paroxysmal SVT (supraventricular tachycardia) ([address]     [place]    Reactive depression 11/15/2022    Last Assessment & Plan:  Formatting of this note might be different from the original. Condition: stable   Taking wellbutrin with good results. Never seen mental health provider. Condition managed by PCP  Medications: Taking medications as prescribed  If taking medications, do not stop treatment without consulting healthcare provider. If symptoms worsen or do not improve/stabilize, notify health     Rectocele 2/22/2016    Last Assessment & Plan:  Formatting of this note might be different from the original. Occasional splinting with BM's. Will have her evaluated by urogyn regarding need for surgical intervention. I discussed with her that surgery may not be necessary and that the pelvic floor surgeons could advise her regarding this decision. If surgery is warranted, can consider a joint case. Ureteral stone 12/21/2016       Past Surgical History:   Procedure Laterality Date    ABLATION OF DYSRHYTHMIC FOCUS  2003    BLADDER SUSPENSION  04/2016    CLOSE CYSTOSTOMY      for kidney stones    IR NEPHROURETERAL CATHETER PLACEMENT NEW ACCESS  2/13/2019    IR NEPHROURETERAL CATHETER PLACEMENT NEW ACCESS  2/13/2019    IR NEPHROURETERAL CATHETER PLACEMENT NEW ACCESS 2/13/2019 [facility] RADIOLOGY SPECIALS    LITHOTRIPSY      [place] UNLISTED PROCEDURE CARDIAC SURGERY  2003    SVT ablation    TUBAL LIGATION  2006    UROLOGICAL SURGERY  1/2017, 9/2017, 9/2017    cysto x 3     UROLOGICAL SURGERY  02/2019    stent placement    UROLOGICAL SURGERY  2015    bladder sling        Social History     Tobacco Use    Smoking status: Never    Smokeless tobacco: Never   Substance Use Topics    Alcohol use: No      Social History     Substance and Sexual Activity   Drug Use No       Family History   Problem Relation Age of Onset    No Known Problems Brother     No Known Problems Sister     Cancer Father         melanoma (face)     Hypertension Father     Heart Disease Mother         congenital heart block with celia    Diabetes Father           There is no immunization history on file for this patient.   Allergies   Allergen Reactions    Ciprofloxacin Other (See Comments) \"joint pain\"     Prior to Admit Medications:  Current Outpatient Medications   Medication Instructions    atenolol (TENORMIN) 25 mg, Oral    omeprazole (PRILOSEC OTC) 20 mg, Oral, 2 TIMES DAILY    potassium citrate (UROCIT-K) 10 MEQ (1080 MG) extended release tablet 10 mEq, Oral, 2 TIMES DAILY         Objective:   Patient Vitals for the past 24 hrs:   Temp Pulse Resp BP SpO2   01/17/23 1620 98.1 °F (36.7 °C) -- -- -- --   01/17/23 1556 -- (!) 114 16 101/63 94 %   01/17/23 1516 -- (!) 114 15 108/65 96 %   01/17/23 1456 -- (!) 122 18 121/75 97 %   01/17/23 1426 -- (!) 125 -- 134/82 97 %   01/17/23 1314 (!) 102.9 °F (39.4 °C) (!) 140 22 (!) 144/99 99 %       Oxygen Therapy  SpO2: 94 %  O2 Device: None (Room air)    Estimated body mass index is 26.22 kg/m² as calculated from the following:    Height as of this encounter: 5' 3\" (1.6 m). Weight as of this encounter: 148 lb (67.1 kg). Intake/Output Summary (Last 24 hours) at 1/17/2023 1847  Last data filed at 1/17/2023 1453  Gross per 24 hour   Intake 100 ml   Output --   Net 100 ml         Physical Exam:    Blood pressure 101/63, pulse (!) 114, temperature 98.1 °F (36.7 °C), temperature source Oral, resp. rate 16, height 5' 3\" (1.6 m), weight 148 lb (67.1 kg), SpO2 94 %. Physical Exam  Vitals and nursing note reviewed. Constitutional:       Appearance: She is normal weight. She is ill-appearing and diaphoretic. HENT:      Head: Normocephalic and atraumatic. Nose: Nose normal.      Mouth/Throat:      Mouth: Mucous membranes are dry. Eyes:      Extraocular Movements: Extraocular movements intact. Conjunctiva/sclera: Conjunctivae normal.      Pupils: Pupils are equal, round, and reactive to light. Cardiovascular:      Rate and Rhythm: Regular rhythm. Tachycardia present. Heart sounds: No murmur heard. Pulmonary:      Effort: Pulmonary effort is normal. Tachypnea present. Breath sounds: Normal breath sounds. No wheezing, rhonchi or rales. Abdominal:      General: Abdomen is flat. Bowel sounds are normal. There is no distension. Palpations: Abdomen is soft. Tenderness: There is abdominal tenderness. Musculoskeletal:      Cervical back: Neck supple. Right lower leg: No edema. Left lower leg: No edema. Skin:     General: Skin is warm. Capillary Refill: Capillary refill takes less than 2 seconds. Coloration: Skin is pale. Neurological:      General: No focal deficit present. Mental Status: She is alert and oriented to person, place, and time. Mental status is at baseline.    Psychiatric:         Mood and Affect: Mood normal.         Behavior: Behavior normal.         I have personally reviewed labs and tests showing:  Recent Labs:  Recent Results (from the past 24 hour(s))   Lactate, Sepsis    Collection Time: 01/17/23  1:51 PM   Result Value Ref Range    Lactic Acid, Sepsis 0.9 0.4 - 2.0 MMOL/L   Urinalysis with Reflex to Culture    Collection Time: 01/17/23  1:51 PM    Specimen: Urine   Result Value Ref Range    Color, UA YELLOW/STRAW      Appearance CLOUDY      Specific [place], UA 1.010 1.001 - 1.023      pH, Urine 6.5 5.0 - 9.0      Protein,  (A) NEG mg/dL    Glucose, UA Negative mg/dL    Ketones, Urine Negative NEG mg/dL    Bilirubin Urine Negative NEG      Blood, Urine MODERATE (A) NEG      Urobilinogen, Urine 0.2 0.2 - 1.0 EU/dL    Nitrite, Urine Positive (A) NEG      Leukocyte Esterase, Urine LARGE (A) NEG      WBC, UA  0 /hpf    RBC, UA 3-5 0 /hpf    BACTERIA, URINE 4+ (H) 0 /hpf    Urine Culture if Indicated URINE CULTURE ORDERED      Epithelial Cells UA 0 0 /hpf    Casts 0 0 /lpf    Crystals 0 0 /LPF    Mucus, UA 0 0 /lpf    Other observations RESULTS VERIFIED MANUALLY     CBC with Auto Differential    Collection Time: 01/17/23  1:59 PM   Result Value Ref Range    WBC 10.3 4.3 - 11.1 K/uL    RBC 4.77 4.05 - 5.2 M/uL    Hemoglobin 14.5 11.7 - 15.4 g/dL    Hematocrit 42.2 35.8 - 46.3 % MCV 88.5 82 - 102 FL    MCH 30.4 26.1 - 32.9 PG    MCHC 34.4 31.4 - 35.0 g/dL    RDW 11.9 11.9 - 14.6 %    Platelets 777 266 - 653 K/uL    MPV 9.9 9.4 - 12.3 FL    nRBC 0.00 0.0 - 0.2 K/uL    Differential Type AUTOMATED      Seg Neutrophils 93 (H) 43 - 78 %    Lymphocytes 3 (L) 13 - 44 %    Monocytes 4 4.0 - 12.0 %    Eosinophils % 0 (L) 0.5 - 7.8 %    Basophils 0 0.0 - 2.0 %    Immature Granulocytes 0 0.0 - 5.0 %    Segs Absolute 9.6 (H) 1.7 - 8.2 K/UL    Absolute Lymph # 0.3 (L) 0.5 - 4.6 K/UL    Absolute Mono # 0.4 0.1 - 1.3 K/UL    Absolute Eos # 0.0 0.0 - 0.8 K/UL    Basophils Absolute 0.0 0.0 - 0.2 K/UL    Absolute Immature Granulocyte 0.0 0.0 - 0.5 K/UL   Comprehensive Metabolic Panel    Collection Time: 01/17/23  1:59 PM   Result Value Ref Range    Sodium 137 133 - 143 mmol/L    Potassium 3.5 3.5 - 5.1 mmol/L    Chloride 104 101 - 110 mmol/L    CO2 23 21 - 32 mmol/L    Anion Gap 10 2 - 11 mmol/L    Glucose 104 (H) 65 - 100 mg/dL    BUN 16 6 - 23 MG/DL    Creatinine 1.10 (H) 0.6 - 1.0 MG/DL    Est, Glom Filt Rate >60 >60 ml/min/1.73m2    Calcium 10.5 (H) 8.3 - 10.4 MG/DL    Total Bilirubin 0.8 0.2 - 1.1 MG/DL    ALT 42 12 - 65 U/L    AST 32 15 - 37 U/L    Alk Phosphatase 121 50 - 136 U/L    Total Protein 7.9 6.3 - 8.2 g/dL    Albumin 4.5 3.5 - 5.0 g/dL    Globulin 3.4 2.8 - 4.5 g/dL    Albumin/Globulin Ratio 1.3 0.4 - 1.6     Culture, Blood 1    Collection Time: 01/17/23  1:59 PM    Specimen: Blood   Result Value Ref Range    Special Requests LEFT  Antecubital        Culture PENDING        I have personally reviewed imaging studies showing:  CT ABDOMEN PELVIS RENAL STONE    Result Date: 1/17/2023  EXAMINATION: CT ABDOMEN PELVIS RENAL STONE  1/17/2023 2:03 PM ACCESSION NUMBER:  [id number] COMPARISON: CT urogram February 14, 2019 INDICATION:  flank pain TECHNIQUE: Contiguous axial computed tomographic images were obtained from the upper abdomen to the symphysis pubis without intravenous contrast. Coronal reconstructions were also performed. Please note that the detection of solid organ and vascular abnormalities is limited in the absence of intravenous contrast. Radiation dose reduction techniques were used for this study. Our CT scanners use one or all of the following: Automated exposure control, adjustment of the mA and/or kV according to patient size, iterative reconstruction. FINDINGS: Lung Bases: Unremarkable. Liver: Imaged portions of the liver are unremarkable. Gallbladder/Biliary: Unremarkable. No biliary ductal dilatation. Pancreas: Unremarkable. Spleen: Unremarkable. Adrenal Glands: Unremarkable. Genitourinary: Punctate nonobstructing renal calculi with hyperattenuation of the medullary pyramids suggesting sequelae of medullary calcinosis. Multiple round phleboliths within the posterior pelvis. There are no calcifications seen along the course of either ureter. The periureteral fat is preserved. Partially distended bladder is unremarkable. Unremarkable. No pelvic mass. Gastrointestinal: Normal caliber small and large bowel. The appendix is normal. Mild sigmoid diverticulosis without diverticulitis. Mesentery/Retroperitoneum/Peritoneum: Scattered likely reactive left greater than right periaortic lymph node otherwise considered enlarged by size criteria. Vessels: Normal caliber abdominal aorta. Musculoskeletal: No acute or aggressive osseous abnormalities. 1.  Bilateral left greater than right punctate nonobstructing renal calculi and findings of medullary calcinosis without obstructing renal calculi or hydronephrosis. Echocardiogram:  No results found for this or any previous visit.         Orders Placed This Encounter   Medications    0.9 % sodium chloride bolus    piperacillin-tazobactam (ZOSYN) 4,500 mg in sodium chloride 0.9 % 100 mL IVPB (mini-bag)     Order Specific Question:   Antimicrobial Indications     Answer:   Urinary Tract Infection    morphine sulfate (PF) injection 4 mg ketorolac (TORADOL) injection 15 mg    ondansetron (ZOFRAN) injection 4 mg    acetaminophen (TYLENOL) tablet 650 mg    HYDROmorphone (DILAUDID) 1 MG/ML injection     Ramiro Chris: cabinet override    lactated ringers infusion    tamsulosin (FLOMAX) capsule 0.4 mg    ondansetron (ZOFRAN) injection 4 mg    promethazine (PHENERGAN) tablet 25 mg    potassium bicarb-citric acid (EFFER-K) effervescent tablet 40 mEq    HYDROmorphone HCl PF (DILAUDID) injection 0.5 mg    naloxone (NARCAN) injection 0.4 mg    ketorolac (TORADOL) injection 15 mg    famotidine (PEPCID) tablet 20 mg         Signed:  Damaris Tabor DO, DO    Part of this note may have been written by using a voice dictation software. The note has been proof read but may still contain some grammatical/other typographical errors.

## 2023-01-17 NOTE — ED TRIAGE NOTES
Pt arrives ambulatory from home. Pt states she passed a kidney stone this morning. Usually when she passes a stone she starts to feel better but is not feeling better. Has had sepsis related to kidney stones in past.  Denies abdominal pain, v/d. Some nausea.

## 2023-01-17 NOTE — ED PROVIDER NOTES
[facility] Emergency Department Provider Note                   PCP:                EVITA Arambula NP               Age: 50 y.o. Sex: female       Leigh Pink is a 50 y.o. female who presents to the Emergency Department with chief complaint of    Chief Complaint   Patient presents with    Flank Pain      Patient states that she has frequent urinary tract infections as well as kidney stones. She states that about 3 weeks ago she was treated for a UTI and it cleared up. She states about 9 days ago she started having right-sided flank pain. She describes it as a constant dull sensation with intermittent sharp episodes that were severe and radiated to her right lower quadrant. She states this felt like a kidney stone. She states this has persisted since the onset. She states this morning she started having left-sided flank pain similar to her right as well as fevers and chills. She started having nausea at that time but no vomiting. Patient states that she has had a burning sensation when she urinates but no blood in it. Patient has been able to urinate today. The history is provided by the patient. All other systems reviewed and are negative. Review of Systems   Constitutional:  Positive for chills, diaphoresis, fatigue and fever. HENT:  Negative for congestion and sore throat. Eyes:  Negative for visual disturbance. Respiratory:  Negative for cough and shortness of breath. Cardiovascular:  Negative for chest pain. Gastrointestinal:  Positive for abdominal pain and nausea. Negative for diarrhea and vomiting. Genitourinary:  Positive for dysuria and flank pain. Negative for decreased urine volume and hematuria. Musculoskeletal:  Negative for back pain and neck pain. Neurological:  Negative for dizziness and headaches.      Past Medical History:   Diagnosis Date    Anxiety     managed with medication     BRCA gene mutation positive in female     Difficult intubation 12/21/16 sore throat and ulcers after intubation     Essential hypertension 11/04/2016    only when septic in 10/2016, takes atenolol for HR    GERD (gastroesophageal reflux disease)     manage with medication    Hiatal hernia     History of kidney stones     numerous    Kidney stone     left side    Nausea & vomiting     nausea after anesthesia    Paroxysmal SVT (supraventricular tachycardia) (HCC)     ablation- [place]        Past Surgical History:   Procedure Laterality Date    ABLATION OF DYSRHYTHMIC FOCUS  2003    BLADDER SUSPENSION  04/2016    CLOSE CYSTOSTOMY      for kidney stones    IR NEPHROURETERAL CATHETER PLACEMENT NEW ACCESS  2/13/2019    IR NEPHROURETERAL CATHETER PLACEMENT NEW ACCESS  2/13/2019    IR NEPHROURETERAL CATHETER PLACEMENT NEW ACCESS 2/13/2019 [facility] RADIOLOGY SPECIALS    LITHOTRIPSY      IA CARDIAC SURG PROCEDURE UNLIST  2003    SVT ablation    TUBAL LIGATION  2006    UROLOGICAL SURGERY  1/2017, 9/2017, 9/2017    cysto x 3     UROLOGICAL SURGERY  02/2019    stent placement    UROLOGICAL SURGERY  2015    bladder sling        Family History   Problem Relation Age of Onset    No Known Problems Brother     No Known Problems Sister     Cancer Father         melanoma (face)     Hypertension Father     Heart Disease Mother         congenital heart block with celia    Diabetes Father         Social History     Socioeconomic History    Marital status:    Tobacco Use    Smoking status: Never    Smokeless tobacco: Never   Substance and Sexual Activity    Alcohol use: No    Drug use: No        Allergies: Ciprofloxacin    Previous Medications    ATENOLOL (TENORMIN) 25 MG TABLET    Take 25 mg by mouth    OMEPRAZOLE (PRILOSEC OTC) 20 MG TABLET    Take 20 mg by mouth 2 times daily    POTASSIUM CITRATE (UROCIT-K) 10 MEQ (1080 MG) EXTENDED RELEASE TABLET    Take 10 mEq by mouth 2 times daily    SULFAMETHOXAZOLE-TRIMETHOPRIM (BACTRIM DS;SEPTRA DS) 800-160 MG PER TABLET    Take 1 tablet by mouth     Vitals signs and nursing note reviewed. Patient Vitals for the past 4 hrs:   Temp Pulse Resp BP SpO2   01/17/23 1620 98.1 °F (36.7 °C) -- -- -- --   01/17/23 1556 -- (!) 114 16 101/63 94 %   01/17/23 1516 -- (!) 114 15 108/65 96 %   01/17/23 1456 -- (!) 122 18 121/75 97 %   01/17/23 1426 -- (!) 125 -- 134/82 97 %          Physical Exam  Vitals and nursing note reviewed. Constitutional:       Appearance: Normal appearance. HENT:      Head: Normocephalic and atraumatic. Cardiovascular:      Rate and Rhythm: Regular rhythm. Tachycardia present. Pulses: Normal pulses. Heart sounds: Normal heart sounds. Pulmonary:      Effort: Pulmonary effort is normal.      Breath sounds: Normal breath sounds. Abdominal:      General: Abdomen is flat. Bowel sounds are normal. There is no distension. Palpations: Abdomen is soft. Tenderness: There is no abdominal tenderness. There is right CVA tenderness. There is no left CVA tenderness, guarding or rebound. Musculoskeletal:         General: No tenderness. Cervical back: Normal range of motion and neck supple. Skin:     General: Skin is warm and dry. Neurological:      General: No focal deficit present. Mental Status: She is alert and oriented to person, place, and time.         Orders Placed This Encounter   Procedures    Critical Care    Culture, Blood 1    Culture, Blood 1    Culture, Urine    CT ABDOMEN PELVIS RENAL STONE    CBC with Auto Differential    Comprehensive Metabolic Panel    Lactate, Sepsis    Urinalysis with Reflex to Culture    Cardiac Monitor - ED Only    Continuous Pulse Oximetry    EKG 12 Lead    Saline lock IV       Critical Care  Performed by: Tasneem Michelle MD  Authorized by: Tasneem Michelle MD     Critical care provider statement:     Critical care time (minutes):  35    Critical care time was exclusive of:  Separately billable procedures and treating other patients    Critical care was necessary to treat or prevent imminent or life-threatening deterioration of the following conditions:  Circulatory failure, shock and sepsis    Critical care was time spent personally by me on the following activities:  Blood draw for specimens, development of treatment plan with patient or surrogate, discussions with primary provider, evaluation of patient's response to treatment, examination of patient, obtaining history from patient or surrogate, ordering and performing treatments and interventions, ordering and review of laboratory studies, ordering and review of radiographic studies, re-evaluation of patient's condition and review of old charts    I assumed direction of critical care for this patient from another provider in my specialty: no      Care discussed with: admitting provider      ED EKG Interpretation  EKG was interpreted in the absence of a cardiologist.    Rate: Rate: Tachycardia  EKG Interpretation: EKG Interpretation: sinus rhythm  ST Segments: Nonspecific ST segments - NO STEMI  Heart rate 127    Results Include:    Recent Results (from the past 24 hour(s))   Lactate, Sepsis    Collection Time: 01/17/23  1:51 PM   Result Value Ref Range    Lactic Acid, Sepsis 0.9 0.4 - 2.0 MMOL/L   Urinalysis with Reflex to Culture    Collection Time: 01/17/23  1:51 PM    Specimen: Urine   Result Value Ref Range    Color, UA YELLOW/STRAW      Appearance CLOUDY      Specific [place], UA 1.010 1.001 - 1.023      pH, Urine 6.5 5.0 - 9.0      Protein,  (A) NEG mg/dL    Glucose, UA Negative mg/dL    Ketones, Urine Negative NEG mg/dL    Bilirubin Urine Negative NEG      Blood, Urine MODERATE (A) NEG      Urobilinogen, Urine 0.2 0.2 - 1.0 EU/dL    Nitrite, Urine Positive (A) NEG      Leukocyte Esterase, Urine LARGE (A) NEG      WBC, UA  0 /hpf    RBC, UA 3-5 0 /hpf    BACTERIA, URINE 4+ (H) 0 /hpf    Urine Culture if Indicated URINE CULTURE ORDERED      Epithelial Cells UA 0 0 /hpf    Casts 0 0 /lpf    Crystals 0 0 /LPF Mucus, UA 0 0 /lpf    Other observations RESULTS VERIFIED MANUALLY     CBC with Auto Differential    Collection Time: 01/17/23  1:59 PM   Result Value Ref Range    WBC 10.3 4.3 - 11.1 K/uL    RBC 4.77 4.05 - 5.2 M/uL    Hemoglobin 14.5 11.7 - 15.4 g/dL    Hematocrit 42.2 35.8 - 46.3 %    MCV 88.5 82 - 102 FL    MCH 30.4 26.1 - 32.9 PG    MCHC 34.4 31.4 - 35.0 g/dL    RDW 11.9 11.9 - 14.6 %    Platelets 434 885 - 129 K/uL    MPV 9.9 9.4 - 12.3 FL    nRBC 0.00 0.0 - 0.2 K/uL    Differential Type AUTOMATED      Seg Neutrophils 93 (H) 43 - 78 %    Lymphocytes 3 (L) 13 - 44 %    Monocytes 4 4.0 - 12.0 %    Eosinophils % 0 (L) 0.5 - 7.8 %    Basophils 0 0.0 - 2.0 %    Immature Granulocytes 0 0.0 - 5.0 %    Segs Absolute 9.6 (H) 1.7 - 8.2 K/UL    Absolute Lymph # 0.3 (L) 0.5 - 4.6 K/UL    Absolute Mono # 0.4 0.1 - 1.3 K/UL    Absolute Eos # 0.0 0.0 - 0.8 K/UL    Basophils Absolute 0.0 0.0 - 0.2 K/UL    Absolute Immature Granulocyte 0.0 0.0 - 0.5 K/UL   Comprehensive Metabolic Panel    Collection Time: 01/17/23  1:59 PM   Result Value Ref Range    Sodium 137 133 - 143 mmol/L    Potassium 3.5 3.5 - 5.1 mmol/L    Chloride 104 101 - 110 mmol/L    CO2 23 21 - 32 mmol/L    Anion Gap 10 2 - 11 mmol/L    Glucose 104 (H) 65 - 100 mg/dL    BUN 16 6 - 23 MG/DL    Creatinine 1.10 (H) 0.6 - 1.0 MG/DL    Est, Glom Filt Rate >60 >60 ml/min/1.73m2    Calcium 10.5 (H) 8.3 - 10.4 MG/DL    Total Bilirubin 0.8 0.2 - 1.1 MG/DL    ALT 42 12 - 65 U/L    AST 32 15 - 37 U/L    Alk Phosphatase 121 50 - 136 U/L    Total Protein 7.9 6.3 - 8.2 g/dL    Albumin 4.5 3.5 - 5.0 g/dL    Globulin 3.4 2.8 - 4.5 g/dL    Albumin/Globulin Ratio 1.3 0.4 - 1.6     Culture, Blood 1    Collection Time: 01/17/23  1:59 PM    Specimen: Blood   Result Value Ref Range    Special Requests LEFT  Antecubital        Culture PENDING           CT ABDOMEN PELVIS RENAL STONE   Final Result   1.   Bilateral left greater than right punctate nonobstructing renal calculi and findings of medullary calcinosis without obstructing renal calculi or   hydronephrosis. ED Course as of 01/17/23 1813   Tue Jan 17, 2023   1517 Patient is resting comfortably in bed with IV fluid infusing. She states her pain is better now. Her heart rate is 117 with a blood pressure 108/65 on the monitor. [CW]   1635 Patient is resting comfortably in bed in no distress. Her heart rate is 110 with a blood pressure 100/60. She was updated on the results and plan and is agreeable to admission. [CW]      ED Course User Index  [CW] Tanner Faith MD       MDM  Number of Diagnoses or Management Options  Acute pyelonephritis  Septicemia [facility])  Diagnosis management comments: Patient with a history of kidney stones and frequent urinary tract infections presented for flank pain and fever. Patient was recently treated for urinary tract infections. Patient was febrile and tachycardic with a heart rate of 140. I initiated sepsis protocol with broad-spectrum antibiotics and sepsis fluid bolus. Patient's blood pressure has been on the low end of normal range. She did receive 30 cc/kg of IV fluid and her heart rate improved to 110. Patient's white count and lactate are normal.  Blood cultures have been drawn. Patient's urinalysis is consistent with an infection. Patient's abdomen pelvic CT showed stones in bilateral kidneys but no obstruction or hydronephrosis. Patient was given morphine, Toradol, and Zofran for symptoms. Hospitalist was consulted for admission. Explanation: The diagnosis and plan as well as the results of any testing and treatments today in the department were communicated to the patient and/or their family/caregiver (if present). The patient/caregiver verbalized understanding and realize that they are being admitted to the hospital for further evaluation and treatment. All questions were answered. ICD-10-CM    1.  Acute pyelonephritis  N10 2. Septicemia ([facility]  A41.9           DISPOSITION Decision To Admit 01/17/2023 04:36:16 PM       Voice dictation software was used during the making of this note. This software is not perfect and grammatical and other typographical errors may be present. This note has not been completely proofread for errors.      Gino Doyle MD  01/17/23 0481

## 2023-01-18 LAB
25(OH)D3 SERPL-MCNC: 29.8 NG/ML (ref 30–100)
ACCESSION NUMBER, LLC1M: ABNORMAL
ACINETOBACTER CALCOAC BAUMANNII COMPLEX BY PCR: NOT DETECTED
ALBUMIN SERPL-MCNC: 2.9 G/DL (ref 3.5–5)
ALBUMIN/GLOB SERPL: 1.1 (ref 0.4–1.6)
ALP SERPL-CCNC: 80 U/L (ref 50–136)
ALT SERPL-CCNC: 27 U/L (ref 12–65)
ANION GAP SERPL CALC-SCNC: 10 MMOL/L (ref 2–11)
AST SERPL-CCNC: 19 U/L (ref 15–37)
BACTEROIDES FRAGILIS BY PCR: NOT DETECTED
BASOPHILS # BLD: 0 K/UL (ref 0–0.2)
BASOPHILS NFR BLD: 0 % (ref 0–2)
BILIRUB SERPL-MCNC: 0.7 MG/DL (ref 0.2–1.1)
BIOFIRE TEST COMMENT: ABNORMAL
BLACTX-M ISLT/SPM QL: NOT DETECTED
BLAIMP ISLT/SPM QL: NOT DETECTED
BLAKPC BLD POS QL NAA+NON-PROBE: NOT DETECTED
BLAOXA-48-LIKE ISLT/SPM QL: NOT DETECTED
BLAVIM ISLT/SPM QL: NOT DETECTED
BUN SERPL-MCNC: 12 MG/DL (ref 6–23)
C ALBICANS DNA BLD POS QL NAA+NON-PROBE: NOT DETECTED
C GLABRATA DNA BLD POS QL NAA+NON-PROBE: NOT DETECTED
C KRUSEI DNA BLD POS QL NAA+NON-PROBE: NOT DETECTED
C PARAP DNA BLD POS QL NAA+NON-PROBE: NOT DETECTED
C TROPICLS DNA BLD POS QL NAA+NON-PROBE: NOT DETECTED
CALCIUM SERPL-MCNC: 8.8 MG/DL (ref 8.3–10.4)
CALCIUM SERPL-MCNC: 9 MG/DL (ref 8.3–10.4)
CANDIDA AURIS BY PCR: NOT DETECTED
CHLORIDE SERPL-SCNC: 108 MMOL/L (ref 101–110)
CO2 SERPL-SCNC: 21 MMOL/L (ref 21–32)
COLISTIN RES MCR-1 ISLT/SPM QL: NOT DETECTED
CREAT SERPL-MCNC: 0.9 MG/DL (ref 0.6–1)
CRYPTOCOCCUS NEOFORMANS/GATTII BY PCR: NOT DETECTED
DIFFERENTIAL METHOD BLD: ABNORMAL
E CLOAC COMP DNA BLD POS NAA+NON-PROBE: NOT DETECTED
E COLI DNA BLD POS QL NAA+NON-PROBE: DETECTED
ENTEROBACTERALES BY PCR: DETECTED
ENTEROCOCCUS FAECALIS BY PCR: NOT DETECTED
ENTEROCOCCUS FAECIUM BY PCR: NOT DETECTED
EOSINOPHIL # BLD: 0 K/UL (ref 0–0.8)
EOSINOPHIL NFR BLD: 0 % (ref 0.5–7.8)
ERYTHROCYTE [DISTWIDTH] IN BLOOD BY AUTOMATED COUNT: 11.9 % (ref 11.9–14.6)
GLOBULIN SER CALC-MCNC: 2.6 G/DL (ref 2.8–4.5)
GLUCOSE SERPL-MCNC: 119 MG/DL (ref 65–100)
GP B STREP DNA BLD POS QL NAA+NON-PROBE: NOT DETECTED
HAEM INFLU DNA BLD POS QL NAA+NON-PROBE: NOT DETECTED
HCT VFR BLD AUTO: 32.7 % (ref 35.8–46.3)
HGB BLD-MCNC: 11.3 G/DL (ref 11.7–15.4)
IMM GRANULOCYTES # BLD AUTO: 0 K/UL (ref 0–0.5)
IMM GRANULOCYTES NFR BLD AUTO: 0 % (ref 0–5)
K OXYTOCA DNA BLD POS QL NAA+NON-PROBE: NOT DETECTED
KLEBSIELLA AEROGENES BY PCR: NOT DETECTED
KLEBSIELLA PNEUMONIAE GROUP BY PCR: NOT DETECTED
L MONOCYTOG DNA BLD POS QL NAA+NON-PROBE: NOT DETECTED
LACTATE SERPL-SCNC: 0.6 MMOL/L (ref 0.4–2)
LYMPHOCYTES # BLD: 0.3 K/UL (ref 0.5–4.6)
LYMPHOCYTES NFR BLD: 2 % (ref 13–44)
MAGNESIUM SERPL-MCNC: 2.3 MG/DL (ref 1.8–2.4)
MCH RBC QN AUTO: 30.7 PG (ref 26.1–32.9)
MCHC RBC AUTO-ENTMCNC: 34.6 G/DL (ref 31.4–35)
MCV RBC AUTO: 88.9 FL (ref 82–102)
MONOCYTES # BLD: 0.8 K/UL (ref 0.1–1.3)
MONOCYTES NFR BLD: 7 % (ref 4–12)
N MEN DNA BLD POS QL NAA+NON-PROBE: NOT DETECTED
NEUTS SEG # BLD: 10 K/UL (ref 1.7–8.2)
NEUTS SEG NFR BLD: 90 % (ref 43–78)
NRBC # BLD: 0 K/UL (ref 0–0.2)
P AERUGINOSA DNA BLD POS NAA+NON-PROBE: NOT DETECTED
PLATELET # BLD AUTO: 142 K/UL (ref 150–450)
PMV BLD AUTO: 10.3 FL (ref 9.4–12.3)
POTASSIUM SERPL-SCNC: 3.8 MMOL/L (ref 3.5–5.1)
PROT SERPL-MCNC: 5.5 G/DL (ref 6.3–8.2)
PROTEUS SP DNA BLD POS QL NAA+NON-PROBE: NOT DETECTED
PTH-INTACT SERPL-MCNC: 122.6 PG/ML (ref 18.5–88)
RBC # BLD AUTO: 3.68 M/UL (ref 4.05–5.2)
RESISTANT GENE NDM BY PCR: NOT DETECTED
RESISTANT GENE TARGETS: ABNORMAL
S AUREUS DNA BLD POS QL NAA+NON-PROBE: NOT DETECTED
S AUREUS+CONS DNA BLD POS NAA+NON-PROBE: NOT DETECTED
S MARCESCENS DNA BLD POS NAA+NON-PROBE: NOT DETECTED
S PNEUM DNA BLD POS QL NAA+NON-PROBE: NOT DETECTED
S PYO DNA BLD POS QL NAA+NON-PROBE: NOT DETECTED
SALMONELLA SPECIES BY PCR: NOT DETECTED
SODIUM SERPL-SCNC: 139 MMOL/L (ref 133–143)
STAPHYLOCOCCUS EPIDERMIDIS BY PCR: NOT DETECTED
STAPHYLOCOCCUS LUGDUNENSIS BY PCR: NOT DETECTED
STENOTROPHOMONAS MALTOPHILIA BY PCR: NOT DETECTED
STREPTOCOCCUS DNA BLD POS NAA+NON-PROBE: NOT DETECTED
WBC # BLD AUTO: 11.1 K/UL (ref 4.3–11.1)

## 2023-01-18 PROCEDURE — 83735 ASSAY OF MAGNESIUM: CPT

## 2023-01-18 PROCEDURE — 2580000003 HC RX 258: Performed by: FAMILY MEDICINE

## 2023-01-18 PROCEDURE — 83970 ASSAY OF PARATHORMONE: CPT

## 2023-01-18 PROCEDURE — 83605 ASSAY OF LACTIC ACID: CPT

## 2023-01-18 PROCEDURE — 36415 COLL VENOUS BLD VENIPUNCTURE: CPT

## 2023-01-18 PROCEDURE — 82306 VITAMIN D 25 HYDROXY: CPT

## 2023-01-18 PROCEDURE — 6370000000 HC RX 637 (ALT 250 FOR IP): Performed by: FAMILY MEDICINE

## 2023-01-18 PROCEDURE — 6360000002 HC RX W HCPCS: Performed by: FAMILY MEDICINE

## 2023-01-18 PROCEDURE — 80053 COMPREHEN METABOLIC PANEL: CPT

## 2023-01-18 PROCEDURE — 85025 COMPLETE CBC W/AUTO DIFF WBC: CPT

## 2023-01-18 PROCEDURE — 1100000000 HC RM PRIVATE

## 2023-01-18 PROCEDURE — 6370000000 HC RX 637 (ALT 250 FOR IP)

## 2023-01-18 RX ORDER — LEVOFLOXACIN 500 MG/1
500 TABLET, FILM COATED ORAL DAILY
Status: DISCONTINUED | OUTPATIENT
Start: 2023-01-18 | End: 2023-01-20 | Stop reason: HOSPADM

## 2023-01-18 RX ADMIN — LEVOFLOXACIN 500 MG: 500 TABLET, FILM COATED ORAL at 13:11

## 2023-01-18 RX ADMIN — ACETAMINOPHEN 650 MG: 325 TABLET ORAL at 03:18

## 2023-01-18 RX ADMIN — ENOXAPARIN SODIUM 40 MG: 100 INJECTION SUBCUTANEOUS at 08:35

## 2023-01-18 RX ADMIN — SODIUM CHLORIDE, POTASSIUM CHLORIDE, SODIUM LACTATE AND CALCIUM CHLORIDE: 600; 310; 30; 20 INJECTION, SOLUTION INTRAVENOUS at 20:38

## 2023-01-18 RX ADMIN — TAMSULOSIN HYDROCHLORIDE 0.4 MG: 0.4 CAPSULE ORAL at 08:36

## 2023-01-18 RX ADMIN — PROMETHAZINE HYDROCHLORIDE 25 MG: 25 TABLET ORAL at 13:11

## 2023-01-18 RX ADMIN — FAMOTIDINE 20 MG: 20 TABLET, FILM COATED ORAL at 08:36

## 2023-01-18 RX ADMIN — FAMOTIDINE 20 MG: 20 TABLET, FILM COATED ORAL at 20:38

## 2023-01-18 RX ADMIN — SODIUM CHLORIDE, PRESERVATIVE FREE 10 ML: 5 INJECTION INTRAVENOUS at 20:39

## 2023-01-18 RX ADMIN — SODIUM CHLORIDE, PRESERVATIVE FREE 5 ML: 5 INJECTION INTRAVENOUS at 08:36

## 2023-01-18 RX ADMIN — ACETAMINOPHEN 650 MG: 325 TABLET ORAL at 10:36

## 2023-01-18 RX ADMIN — BUPROPION HYDROCHLORIDE 150 MG: 150 TABLET, EXTENDED RELEASE ORAL at 08:36

## 2023-01-18 RX ADMIN — SODIUM CHLORIDE, POTASSIUM CHLORIDE, SODIUM LACTATE AND CALCIUM CHLORIDE: 600; 310; 30; 20 INJECTION, SOLUTION INTRAVENOUS at 05:04

## 2023-01-18 RX ADMIN — ACETAMINOPHEN 650 MG: 325 TABLET ORAL at 16:09

## 2023-01-18 RX ADMIN — PIPERACILLIN AND TAZOBACTAM 3375 MG: 3; .375 INJECTION, POWDER, FOR SOLUTION INTRAVENOUS at 05:04

## 2023-01-18 RX ADMIN — ACETAMINOPHEN 650 MG: 325 TABLET ORAL at 22:24

## 2023-01-18 RX ADMIN — KETOROLAC TROMETHAMINE 15 MG: 30 INJECTION, SOLUTION INTRAMUSCULAR at 05:12

## 2023-01-18 RX ADMIN — ESTRADIOL 0.5 MG: 1 TABLET ORAL at 08:35

## 2023-01-18 RX ADMIN — KETOROLAC TROMETHAMINE 15 MG: 30 INJECTION, SOLUTION INTRAMUSCULAR at 20:38

## 2023-01-18 ASSESSMENT — PAIN SCALES - GENERAL
PAINLEVEL_OUTOF10: 3
PAINLEVEL_OUTOF10: 0
PAINLEVEL_OUTOF10: 2
PAINLEVEL_OUTOF10: 3
PAINLEVEL_OUTOF10: 4
PAINLEVEL_OUTOF10: 3
PAINLEVEL_OUTOF10: 0
PAINLEVEL_OUTOF10: 1

## 2023-01-18 ASSESSMENT — PAIN DESCRIPTION - LOCATION
LOCATION: HEAD

## 2023-01-18 ASSESSMENT — PAIN DESCRIPTION - DESCRIPTORS
DESCRIPTORS: ACHING

## 2023-01-18 ASSESSMENT — PAIN - FUNCTIONAL ASSESSMENT
PAIN_FUNCTIONAL_ASSESSMENT: ACTIVITIES ARE NOT PREVENTED
PAIN_FUNCTIONAL_ASSESSMENT: ACTIVITIES ARE NOT PREVENTED

## 2023-01-18 ASSESSMENT — PAIN DESCRIPTION - ORIENTATION
ORIENTATION: MID

## 2023-01-18 NOTE — CARE COORDINATION
CM met with Mrs. Adilson Campbell at bedside regarding discharge planning. Patient alert and oriented x4. Demographics verified. Patient lives with her spouse and daughter in a one level home with steps. At baseline, patient is independent with completing ADL's and drives. No DME use. Patient insured with a PCP for follow up care. Patient is able to afford prescription medications. Discharge planning: Patient will return home with family when stable for discharge. No CM needs expressed or identified at this time. CM will continue to follow and assist as needed. 01/18/23 1228   Service Assessment   Patient Orientation Alert and Oriented;Person;Place;Situation   Cognition Alert   History Provided By Patient;Medical Record   Primary Caregiver Self   Support Systems Children;Family Members   Patient's Healthcare Decision Maker is: Legal Next of Nati 67   PCP Verified by CM Yes   Last Visit to PCP Within last 6 months   Prior Functional Level Independent in ADLs/IADLs   Current Functional Level Independent in ADLs/IADLs   Can patient return to prior living arrangement Yes   Ability to make needs known: Good   Family able to assist with home care needs: Yes   Financial Resources Other (Comment)  ([place] Medicaid[place]   Social/Functional History   Lives With Spouse   Type of Home Mobile home   Home Layout One level   Home Equipment   (Denies DME use.)   ADL Assistance Independent   Ambulation Assistance Independent   Transfer Assistance Independent   Active  Yes   Mode of Transportation Car   Occupation Self employed   Discharge Planning   Type of [place] Spouse/Significant Other   Patient expects to be discharged to: Trailer/mobile home   [address] Discharge   Transition of [address] ([address]) [address] Discharge None   [address] Provided?  No   Mode of Transport at Discharge Other (see comment)  (Family)   Confirm Follow Up Transport Self   Condition of Participation: Discharge Planning   The Plan for Transition of Care is related to the following treatment goals: Return to baseline.

## 2023-01-18 NOTE — PROGRESS NOTES
Hospitalist Progress Note   Admit Date:  2023  1:39 PM   Name:  Prasanna Erazo   Age:  50 y.o. Sex:  female  :  1975   MRN:  [id number]   Room:  [facility]    Presenting Complaint: Flank Pain     Reason(s) for Admission: Septicemia [facility]) [A41.9]  Acute pyelonephritis [N10]  Sepsis secondary to UTI ([address] [A41.9, N39.0]     Hospital Course:   Prasanna Erazo is a 50 y.o. female with medical history of nephrolithasis, UTIs, BRCA1 with ovarian cancer who presented with flank pain, chills, fever, nausea, dysuria. Treated for UTI on 22. This past  developed right sided flank pain associated with bladder spasms, malodorous urine increased urinary frequency. Kidney stone passed this AM the size of a mechanical pencil eraser. History of stones and UTIs x 25 years. Moved to [place] in  then in  was septic from UTI and stones and again in 2017 with sepsis requiring PICC line for 2 weeks OP abx. She follows with , urologist. She reports stones run in her family. In the ED, she was febrile 102.9, , RR 22, BP 97/62. WBC 10.2 Calcium 10.5   UA +nitrate, large LE larege blood. CT renal stone protocol -   1. Bilateral left greater than right punctate nonobstructing renal calculi and   findings of medullary calcinosis without obstructing renal calculi or   hydronephrosis. She rec'd 30 cc/kg sepsis bolus, zosyn, toradol, morphine, zofran in the ED. Subjective & 24hr Events (23): Patient was seen and examined at the bedside. She is complaining of headache and right flank pain. She reported increased urinary frequency. Her dysuria improving. She was spiking fever 100 .9F yesterday. Patient denies nausea, vomiting, diarrhea, palpitations, hematuria      Assessment & Plan:     Sepsis 2/2 acute pyelonephritis  with non obstructing renal calculi bilaterally.    Recent UTI with E. coli treated with outpatient IM ceftriaxone and 7 days of Macrobid  Febrile, tachycardic, hypotensive, lactic acid 0.6  UA positive and urine cultures positive for E. coli  CAT scan showed bilateral left greater than right punctate nonobstructing renal calculi and findings of medullary calcinosis without obstructing renal calculi or hydronephrosis   Zosyn discontinued- history of enterococcus septicemia   Switch to Levaquin EOT 02/14/23.   Anti-emtics, pain control.   Follow-up with Dr. Cuevas as outpatient  urology signed off  F/U ID as outpatient  to assess urinary symptoms and recheck urine    E. coli bacteremia  Continue Levaquin EOT 02/14/23.     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  followed by Dr. Oliver with adrianne outpatient. Cont estradiol      Mood d/o - resume wellbutrin       Anticipated discharge needs: Anticipate hospital stay for 24 hours.         Diet: regular   VTE ppx: lovenox  Code status: Full    Hospital Problems:  Principal Problem:    Sepsis secondary to UTI (HCC)  Active Problems:    BRCA1 gene mutation positive in female    Malignant neoplasm of right ovary (HCC)    Paroxysmal supraventricular tachycardia (HCC)    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 *      Objective:   Patient Vitals for the past 24 hrs:   Temp Pulse Resp BP SpO2   01/18/23 1611 (!) 100.6 °F (38.1 °C) (!) 116 17 135/69 99 %   01/18/23 1117 98.2 °F (36.8 °C) 83 16 96/61 97 %   01/18/23 0716 98.8 °F (37.1 °C) 97 16 (!) 103/57 96 %   01/18/23 0619 99.1 °F (37.3 °C) -- -- -- --   01/18/23 0512 (!) 100.9 °F (38.3 °C) -- -- -- --   01/18/23 0400 (!) 101.7 °F (38.7 °C) (!) 118 15 130/80 96 %   01/18/23 0318 (!) 102.1 °F (38.9 °C) -- -- -- --   01/17/23 2350 99.7 °F (37.6 °C) (!) 108 15 (!) 94/53 95 %   01/17/23 2035 100.1 °F (37.8 °C) (!) 115 16 119/71 96 %   01/17/23 1958 -- (!) 110 19 97/62 96 %  01/17/23 1945 -- (!) 109 19 97/62 97 %   01/17/23 1930 -- (!) 112 17 104/66 96 %   01/17/23 1915 -- (!) 105 15 99/67 97 %   01/17/23 1900 -- (!) 112 18 106/65 97 %   01/17/23 1845 -- (!) 113 14 100/67 97 %       Oxygen Therapy  SpO2: 99 %  O2 Device: None (Room air)  Oxygen Therapy: None (Room air)    Estimated body mass index is 26.67 kg/m² as calculated from the following:    Height as of this encounter: 5' 3\" (1.6 m). Weight as of this encounter: 150 lb 9.2 oz (68.3 kg). Intake/Output Summary (Last 24 hours) at 1/18/2023 1721  Last data filed at 1/18/2023 1432  Gross per 24 hour   Intake --   Output 1750 ml   Net -1750 ml         Physical Exam:     Blood pressure 135/69, pulse (!) 116, temperature (!) 100.6 °F (38.1 °C), temperature source Oral, resp. rate 17, height 5' 3\" (1.6 m), weight 150 lb 9.2 oz (68.3 kg), SpO2 99 %. General:    Well nourished. Head:  Normocephalic, atraumatic  Eyes:  Sclerae appear normal.  Pupils equally round. ENT:  Nares appear normal.  Moist oral mucosa  Neck:  No restricted ROM. Trachea midline   CV:   RRR. No m/r/g. No jugular venous distension. Lungs:   CTAB. No wheezing, rhonchi, or rales. Symmetric expansion. Genitourinary right costovertebral angle tenderness is present  Abdomen:   Soft, nontender, nondistended. Extremities: No cyanosis or clubbing. No edema  Skin:     No rashes and normal coloration. Warm and dry. Neuro:  CN II-XII grossly intact. Sensation intact. Psych:  Normal mood and affect.       I have personally reviewed labs and tests showing:  Recent Labs:  Recent Results (from the past 48 hour(s))   Lactate, Sepsis    Collection Time: 01/17/23  1:51 PM   Result Value Ref Range    Lactic Acid, Sepsis 0.9 0.4 - 2.0 MMOL/L   Urinalysis with Reflex to Culture    Collection Time: 01/17/23  1:51 PM    Specimen: Urine   Result Value Ref Range    Color, UA YELLOW/STRAW      Appearance CLOUDY      Specific [place], UA 1.010 1.001 - 1.023      pH, Urine 6.5 5.0 - 9.0      Protein,  (A) NEG mg/dL    Glucose, UA Negative mg/dL    Ketones, Urine Negative NEG mg/dL    Bilirubin Urine Negative NEG      Blood, Urine MODERATE (A) NEG      Urobilinogen, Urine 0.2 0.2 - 1.0 EU/dL    Nitrite, Urine Positive (A) NEG      Leukocyte Esterase, Urine LARGE (A) NEG      WBC, UA  0 /hpf    RBC, UA 3-5 0 /hpf    BACTERIA, URINE 4+ (H) 0 /hpf    Urine Culture if Indicated URINE CULTURE ORDERED      Epithelial Cells UA 0 0 /hpf    Casts 0 0 /lpf    Crystals 0 0 /LPF    Mucus, UA 0 0 /lpf    Other observations RESULTS VERIFIED MANUALLY     Culture, Urine    Collection Time: 01/17/23  1:51 PM    Specimen: Urine   Result Value Ref Range    Special Requests NO SPECIAL REQUESTS  Reflexed from [id number]        Culture (A)       >100,000 COLONIES/mL Gram negative rods IDENTIFICATION AND SUSCEPTIBILITY TO FOLLOW    Culture <1,000 CFU/ML MIXED SKIN SARAH ISOLATED     CBC with Auto Differential    Collection Time: 01/17/23  1:59 PM   Result Value Ref Range    WBC 10.3 4.3 - 11.1 K/uL    RBC 4.77 4.05 - 5.2 M/uL    Hemoglobin 14.5 11.7 - 15.4 g/dL    Hematocrit 42.2 35.8 - 46.3 %    MCV 88.5 82 - 102 FL    MCH 30.4 26.1 - 32.9 PG    MCHC 34.4 31.4 - 35.0 g/dL    RDW 11.9 11.9 - 14.6 %    Platelets 274 557 - 086 K/uL    MPV 9.9 9.4 - 12.3 FL    nRBC 0.00 0.0 - 0.2 K/uL    Differential Type AUTOMATED      Seg Neutrophils 93 (H) 43 - 78 %    Lymphocytes 3 (L) 13 - 44 %    Monocytes 4 4.0 - 12.0 %    Eosinophils % 0 (L) 0.5 - 7.8 %    Basophils 0 0.0 - 2.0 %    Immature Granulocytes 0 0.0 - 5.0 %    Segs Absolute 9.6 (H) 1.7 - 8.2 K/UL    Absolute Lymph # 0.3 (L) 0.5 - 4.6 K/UL    Absolute Mono # 0.4 0.1 - 1.3 K/UL    Absolute Eos # 0.0 0.0 - 0.8 K/UL    Basophils Absolute 0.0 0.0 - 0.2 K/UL    Absolute Immature Granulocyte 0.0 0.0 - 0.5 K/UL   Comprehensive Metabolic Panel    Collection Time: 01/17/23  1:59 PM   Result Value Ref Range    Sodium 137 133 - 143 mmol/L    Potassium 3.5 3.5 - 5.1 mmol/L    Chloride 104 101 - 110 mmol/L    CO2 23 21 - 32 mmol/L    Anion Gap 10 2 - 11 mmol/L    Glucose 104 (H) 65 - 100 mg/dL    BUN 16 6 - 23 MG/DL    Creatinine 1.10 (H) 0.6 - 1.0 MG/DL    Est, Glom Filt Rate >60 >60 ml/min/1.73m2    Calcium 10.5 (H) 8.3 - 10.4 MG/DL    Total Bilirubin 0.8 0.2 - 1.1 MG/DL    ALT 42 12 - 65 U/L    AST 32 15 - 37 U/L    Alk Phosphatase 121 50 - 136 U/L    Total Protein 7.9 6.3 - 8.2 g/dL    Albumin 4.5 3.5 - 5.0 g/dL    Globulin 3.4 2.8 - 4.5 g/dL    Albumin/Globulin Ratio 1.3 0.4 - 1.6     Culture, Blood 1    Collection Time: 01/17/23  1:59 PM    Specimen: Blood   Result Value Ref Range    Special Requests LEFT  Antecubital        Gram stain Gram negative rods      Gram stain AEROBIC AND ANAEROBIC BOTTLES      Gram stain        RESULTS VERIFIED, PHONED TO AND READ BACK BY Nadine White RN ON 01/18/23 @0635, ADS    Culture CULTURE IN PROGRESS,FURTHER UPDATES TO FOLLOW      Culture        Refer to Blood Culture ID Panel Accession [id number]   Magnesium    Collection Time: 01/17/23  1:59 PM   Result Value Ref Range    Magnesium 1.6 (L) 1.8 - 2.4 mg/dL   Culture, Blood, PCR ID Panel    Collection Time: 01/17/23  1:59 PM    Specimen: Blood   Result Value Ref Range    Accession Number [id number]     Enterococcus faecalis by PCR NOT DETECTED NOTDET      Enterococcus faecium by PCR NOT DETECTED NOTDET      Listeria monocytogenes by PCR NOT DETECTED NOTDET      STAPHYLOCOCCUS NOT DETECTED NOTDET      Staphylococcus Aureus NOT DETECTED NOTDET      Staphylococcus epidermidis by PCR NOT DETECTED NOTDET      Staphylococcus lugdunensis by PCR NOT DETECTED NOTDET      STREPTOCOCCUS NOT DETECTED NOTDET      Streptococcus agalactiae (Group B) NOT DETECTED NOTDET      Strep pneumoniae NOT DETECTED NOTDET      Strep pyogenes,(Grp. A) NOT DETECTED NOTDET      Acinetobacter calcoac baumannii complex by PCR NOT DETECTED NOTDET      Bacteroides fragilis by PCR NOT DETECTED NOTDET Enterobacteriaceae by PCR Detected (A) NOTDET      Enterobacter cloacae complex by PCR NOT DETECTED NOTDET      Escherichia Coli Detected (A) NOTDET      Klebsiella aerogenes by PCR NOT DETECTED NOTDET      Klebsiella oxytoca by PCR NOT DETECTED NOTDET      Klebsiella pneumoniae group by PCR NOT DETECTED NOTDET      Proteus by PCR NOT DETECTED NOTDET      Salmonella species by PCR NOT DETECTED NOTDET      Serratia marcescens by PCR NOT DETECTED NOTDET      Haemophilus Influenzae by PCR NOT DETECTED NOTDET      Neisseria meningitidis by PCR NOT DETECTED NOTDET      Pseudomonas aeruginosa NOT DETECTED NOTDET      Stenotrophomonas maltophilia by PCR NOT DETECTED NOTDET      Candida albicans by PCR NOT DETECTED NOTDET      Candida auris by PCR NOT DETECTED NOTDET      Candida glabrata NOT DETECTED NOTDET      Candida krusei by PCR NOT DETECTED NOTDET      Candida parapsilosis by PCR NOT DETECTED NOTDET      Candida tropicalis by PCR NOT DETECTED NOTDET      Cryptococcus neoformans/gattii by PCR NOT DETECTED NOTDET      Resistant gene targets          Resistant gene ctx-m by PCR NOT DETECTED NOTDET      Resistant gene imp by PCR NOT DETECTED NOTDET      KPC (Carbapenem resistance gene) NOT DETECTED NOTDET      Colistin Resistance mcr-1 gene by PCR NOT DETECTED NOTDET      Resistant gene ndm by PCR NOT DETECTED NOTDET      Resistant gene oxa-48-like by pcr NOT DETECTED NOTDET      Resistant gene vim by PCR NOT DETECTED NOTDET      [employer] test comment        False positive results may rarely occur.  Correlate with clinical,epidemiologic, and other laboratory findings   Lactic Acid    Collection Time: 01/17/23  7:31 PM   Result Value Ref Range    Lactic Acid, Plasma 0.9 0.4 - 2.0 MMOL/L   Culture, Blood 1    Collection Time: 01/17/23  9:12 PM    Specimen: Blood   Result Value Ref Range    Special Requests RIGHT  Antecubital        Culture NO GROWTH AFTER 11 HOURS     CBC with Auto Differential    Collection Time: 01/18/23 5:56 AM   Result Value Ref Range    WBC 11.1 4.3 - 11.1 K/uL    RBC 3.68 (L) 4.05 - 5.2 M/uL    Hemoglobin 11.3 (L) 11.7 - 15.4 g/dL    Hematocrit 32.7 (L) 35.8 - 46.3 %    MCV 88.9 82 - 102 FL    MCH 30.7 26.1 - 32.9 PG    MCHC 34.6 31.4 - 35.0 g/dL    RDW 11.9 11.9 - 14.6 %    Platelets 767 (L) 980 - 450 K/uL    MPV 10.3 9.4 - 12.3 FL    nRBC 0.00 0.0 - 0.2 K/uL    Differential Type AUTOMATED      Seg Neutrophils 90 (H) 43 - 78 %    Lymphocytes 2 (L) 13 - 44 %    Monocytes 7 4.0 - 12.0 %    Eosinophils % 0 (L) 0.5 - 7.8 %    Basophils 0 0.0 - 2.0 %    Immature Granulocytes 0 0.0 - 5.0 %    Segs Absolute 10.0 (H) 1.7 - 8.2 K/UL    Absolute Lymph # 0.3 (L) 0.5 - 4.6 K/UL    Absolute Mono # 0.8 0.1 - 1.3 K/UL    Absolute Eos # 0.0 0.0 - 0.8 K/UL    Basophils Absolute 0.0 0.0 - 0.2 K/UL    Absolute Immature Granulocyte 0.0 0.0 - 0.5 K/UL   Magnesium    Collection Time: 01/18/23  5:56 AM   Result Value Ref Range    Magnesium 2.3 1.8 - 2.4 mg/dL   Comprehensive Metabolic Panel    Collection Time: 01/18/23  5:56 AM   Result Value Ref Range    Sodium 139 133 - 143 mmol/L    Potassium 3.8 3.5 - 5.1 mmol/L    Chloride 108 101 - 110 mmol/L    CO2 21 21 - 32 mmol/L    Anion Gap 10 2 - 11 mmol/L    Glucose 119 (H) 65 - 100 mg/dL    BUN 12 6 - 23 MG/DL    Creatinine 0.90 0.6 - 1.0 MG/DL    Est, Glom Filt Rate >60 >60 ml/min/1.73m2    Calcium 9.0 8.3 - 10.4 MG/DL    Total Bilirubin 0.7 0.2 - 1.1 MG/DL    ALT 27 12 - 65 U/L    AST 19 15 - 37 U/L    Alk Phosphatase 80 50 - 136 U/L    Total Protein 5.5 (L) 6.3 - 8.2 g/dL    Albumin 2.9 (L) 3.5 - 5.0 g/dL    Globulin 2.6 (L) 2.8 - 4.5 g/dL    Albumin/Globulin Ratio 1.1 0.4 - 1.6     Lactic Acid    Collection Time: 01/18/23  5:56 AM   Result Value Ref Range    Lactic Acid, Plasma 0.6 0.4 - 2.0 MMOL/L   PTH, Intact    Collection Time: 01/18/23  5:56 AM   Result Value Ref Range    Calcium 8.8 8.3 - 10.4 MG/DL    Pth Intact 122.6 (H) 18.5 - 88.0 pg/mL   Vitamin D 25 Hydroxy Collection Time: 01/18/23  5:56 AM   Result Value Ref Range    Vit D, 25-Hydroxy 29.8 (L) 30.0 - 100.0 ng/mL       I have personally reviewed imaging studies showing: Other Studies:  CT ABDOMEN PELVIS RENAL STONE   Final Result   1. Bilateral left greater than right punctate nonobstructing renal calculi and   findings of medullary calcinosis without obstructing renal calculi or   hydronephrosis. Current Meds:  Current Facility-Administered Medications   Medication Dose Route Frequency    levoFLOXacin (LEVAQUIN) tablet 500 mg  500 mg Oral Daily    lactated ringers infusion   IntraVENous Continuous    tamsulosin (FLOMAX) capsule 0.4 mg  0.4 mg Oral Daily    ondansetron (ZOFRAN) injection 4 mg  4 mg IntraVENous Q6H PRN    promethazine (PHENERGAN) tablet 25 mg  25 mg Oral Q6H PRN    [Held by provider] atenolol (TENORMIN) tablet 25 mg  25 mg Oral Daily    sodium chloride flush 0.9 % injection 5-40 mL  5-40 mL IntraVENous 2 times per day    sodium chloride flush 0.9 % injection 5-40 mL  5-40 mL IntraVENous PRN    0.9 % sodium chloride infusion   IntraVENous PRN    enoxaparin (LOVENOX) injection 40 mg  40 mg SubCUTAneous Daily    acetaminophen (TYLENOL) tablet 650 mg  650 mg Oral Q6H PRN    Or    acetaminophen (TYLENOL) suppository 650 mg  650 mg Rectal Q6H PRN    aluminum & magnesium hydroxide-simethicone (MAALOX) 200-200-20 MG/5ML suspension 30 mL  30 mL Oral Q6H PRN    naloxone (NARCAN) injection 0.4 mg  0.4 mg IntraVENous PRN    ketorolac (TORADOL) injection 15 mg  15 mg IntraVENous Q6H PRN    famotidine (PEPCID) tablet 20 mg  20 mg Oral BID    HYDROmorphone (DILAUDID) injection 0.5 mg  0.5 mg IntraVENous Q3H PRN    buPROPion (WELLBUTRIN SR) extended release tablet 150 mg  150 mg Oral QAM    estradiol (ESTRACE) tablet 0.5 mg  0.5 mg Oral Daily       Signed:  Jaci Higuera MD    Part of this note may have been written by using a voice dictation software.   The note has been proof read but may still contain some grammatical/other typographical errors.

## 2023-01-18 NOTE — ED NOTES
Pt received room assignment in facility. Report called to accepting RN, Karyn Arreaga. Pt transport requested.       Jackie Castillo RN  01/17/23 1958

## 2023-01-18 NOTE — CONSULTS
Urology Consult    Requesting MD:     Patient: Tavon Álvarez MRN: [id number]  SSN: [id number]    YOB: 1975  Age: 50 y.o. Sex: female      Subjective:      Tavon Álvarez is a 50 y.o. female  with medical history of nephrolithasis, UTIs, BRCA1 with ovarian cancer who presented with flank pain, chills, fever, nausea, dysuria. Treated for UTI on 12/23/22. This past Sunday developed right sided flank pain associated with bladder spasms, malodorous urine increased urinary frequency. Kidney stone passed this AM the size of a mechanical pencil eraser. History of stones and UTIs x 25 years. Moved to [facility] in 2015 then in 2016 was septic from UTI and stones and again in 2017 with sepsis requiring PICC line for 2 weeks OP abx. She follows with , urologist. She reports stones run in her family. In the ED, she was febrile 102.9,     Urology consult for UTI with unobstructed stone. Patient reports pain and chills have improved. Reports passing stone yesterday am. Reports she has a scheduled appointment with Dr. Guanako Kimbrough Wednesday. Past Medical History:   Diagnosis Date    Anxiety     managed with medication     BRCA gene mutation positive in female     Cystostomy status ([address] 1/17/2023    Difficult intubation     12/21/16 sore throat and ulcers after intubation     Essential hypertension 11/04/2016    only when septic in 10/2016, takes atenolol for HR    GERD (gastroesophageal reflux disease)     manage with medication    Gram positive septicemia ([address] 10/27/2016    Hiatal hernia     History of kidney stones     numerous    Kidney stone     left side    Left ureteral stone 9/13/2017    Midline cystocele 2/22/2016    Mucositis due to chemotherapy 9/8/2019    Formatting of this note might be different from the original. Sore on L lateral tongue consistent with mucositis from chemo.  She had this with her prior cycle Chloraseptic spray ordered    Last Assessment & Plan:  Formatting of this note might be different from the original. Chloraseptic spray prn and orajel    Nausea & vomiting     nausea after anesthesia    Neutropenic fever ([facility] 9/8/2019    Formatting of this note might be different from the original. - Patient s/p cycle 2 of carbo/taxol on 8/27 presenting with fever and , demonstrating severe neutropenia.  - Febrile to 101.4 in the ED. Lactate 1.3.  - CXR, UA, and influenza screening negative. No clear source of infection at this time. No localizing symptoms other than dry cough. Blood cultures pending. Respiratory culture to    Paroxysmal SVT (supraventricular tachycardia) ([facility]     ablation- [place]    Reactive depression 11/15/2022    Last Assessment & Plan:  Formatting of this note might be different from the original. Condition: stable   Taking wellbutrin with good results. Never seen mental health provider. Condition managed by PCP  Medications: Taking medications as prescribed  If taking medications, do not stop treatment without consulting healthcare provider. If symptoms worsen or do not improve/stabilize, notify health     Rectocele 2/22/2016    Last Assessment & Plan:  Formatting of this note might be different from the original. Occasional splinting with BM's. Will have her evaluated by urogyn regarding need for surgical intervention. I discussed with her that surgery may not be necessary and that the pelvic floor surgeons could advise her regarding this decision. If surgery is warranted, can consider a joint case.     Ureteral stone 12/21/2016     Past Surgical History:   Procedure Laterality Date    ABLATION OF DYSRHYTHMIC FOCUS  2003    BLADDER SUSPENSION  04/2016    CLOSE CYSTOSTOMY      for kidney stones    IR NEPHROURETERAL CATHETER PLACEMENT NEW ACCESS  2/13/2019    IR NEPHROURETERAL CATHETER PLACEMENT NEW ACCESS  2/13/2019    IR NEPHROURETERAL CATHETER PLACEMENT NEW ACCESS 2/13/2019 [facility] RADIOLOGY SPECIALS    LITHOTRIPSY      [place] UNLISTED PROCEDURE CARDIAC SURGERY 2003    SVT ablation    TUBAL LIGATION  2006    UROLOGICAL SURGERY  1/2017, 9/2017, 9/2017    cysto x 3     UROLOGICAL SURGERY  02/2019    stent placement    UROLOGICAL SURGERY  2015    bladder sling      Family History   Problem Relation Age of Onset    No Known Problems Brother     No Known Problems Sister     Cancer Father         melanoma (face)     Hypertension Father     Heart Disease Mother         congenital heart block with celia    Diabetes Father      Social History     Tobacco Use    Smoking status: Never    Smokeless tobacco: Never   Substance Use Topics    Alcohol use: No      Prior to Admission medications    Medication Sig Start Date End Date Taking? Authorizing Provider   estradiol (ESTRACE) 0.5 MG tablet Take 0.5 mg by mouth daily   Yes Historical Provider, MD   buPROPion (WELLBUTRIN XL) 150 MG extended release tablet Take 150 mg by mouth every morning   Yes Historical Provider, MD   atenolol (TENORMIN) 25 MG tablet Take 25 mg by mouth    Ar Automatic Reconciliation   omeprazole (PRILOSEC OTC) 20 MG tablet Take 20 mg by mouth 2 times daily  Patient not taking: Reported on 1/17/2023    Ar Automatic Reconciliation   potassium citrate (UROCIT-K) 10 MEQ (1080 MG) extended release tablet Take 10 mEq by mouth 2 times daily  Patient not taking: Reported on 1/17/2023 3/8/21   Ar Automatic Reconciliation        Allergies   Allergen Reactions    Ciprofloxacin Other (See Comments)     \"joint pain\"       Review of Systems:  A comprehensive review of systems was negative except for that written in the History of Present Illness.     Objective:     Vitals:    01/18/23 0400 01/18/23 0512 01/18/23 0619 01/18/23 0716   BP: 130/80   (!) 103/57   Pulse: (!) 118   97   Resp: 15   16   Temp: (!) 101.7 °F (38.7 °C) (!) 100.9 °F (38.3 °C) 99.1 °F (37.3 °C) 98.8 °F (37.1 °C)   TempSrc: Oral Oral Oral Oral   SpO2: 96%   96%   Weight:       Height:            Physical Exam:  GENERAL ASSESSMENT: alert, oriented to person, place and time, no acute distress and no anxiety, depression or agitation  Chest: clear to auscultation, no wheezes, rales or rhonchi, symmetric air entry. CVS exam: normal rate, regular rhythm, normal S1, S2, no murmurs, rubs, clicks or gallops. ABDOMEN: not done  Neurological exam reveals alert, oriented, normal speech, no focal findings or movement disorder noted. FEMALE GENITOURINARY EXAM: not done  MALE GENITAL EXAM: not done    Assessment:     Patient voiding without difficulty. Cr. . 90. VSS Tmax 102. 1. CT ABDOMEN PELVIS RENAL STONE:    Impression   1. Bilateral left greater than right punctate nonobstructing renal calculi and   findings of medullary calcinosis without obstructing renal calculi or   hydronephrosis. Plan:     Continue current treatment plan per primary team.  Patient to Keep scheduled appointment with Dr. Bobby Ivory. Call with questions. Will sign off.     Signed By: NERI He     January 18, 2023

## 2023-01-18 NOTE — ED NOTES
Pt report and care given to [facility], [address], RN  01/17/23 Skyler Dick

## 2023-01-18 NOTE — PLAN OF CARE
Problem: Discharge Planning  Goal: Discharge to home or other facility with appropriate resources  Outcome: Progressing  Flowsheets (Taken 1/17/2023 2030)  Discharge to home or other facility with appropriate resources:   Identify barriers to discharge with patient and caregiver   Identify discharge learning needs (meds, wound care, etc)   Refer to discharge planning if patient needs post-hospital services based on physician order or complex needs related to functional status, cognitive ability or social support system   Arrange for needed discharge resources and transportation as appropriate     Problem: Safety - Adult  Goal: Free from fall injury  Outcome: Progressing  Flowsheets (Taken 1/17/2023 2028)  Free From Fall Injury: Instruct family/caregiver on patient safety     Problem: Pain  Goal: Verbalizes/displays adequate comfort level or baseline comfort level  Outcome: Progressing

## 2023-01-18 NOTE — PROGRESS NOTES
Pt is alert and oriented. Pt in bed, resting. Bed in low position, wheels locked, call light within reach, instructed to call with any needs. Hourly rounds completed this shift. Pt has c/o pain, treated per MAR. Fever noted, on call hospitalist notified and treated per [facility]. IV is infusing, and dressing is clean, dry, and intact. Urine strained, no stones noted. Report to be given to day shift nurse.

## 2023-01-18 NOTE — CONSULTS
Infectious Disease Consult    Today's Date: 1/18/2023   Admit Date: 1/17/2023    Impression:   E Coli bacteremia, blood cx 01/17   Acute pyelonephritis with non obstructing renal calculi bilaterally   UTI, urine cx with GNR   Recent UTI, cx with E Coli treated outpatient with IM ceftriaxone and 7 days Macrobid     Plan:   Blood cx 01/17 with E Coli on PCR. Urine cx with GNR. She had recent urine cx done 12/23 that grew E coli, S to cipro and levaquin   Due to history of frequent UTI's with E Coli (at least 3 in 2022) and known kidney stones will treat with longer course of antibiotics   Discontinue Zosyn  Start Levaquin 500 mg once daily  Plan to treat for 4 weeks oral Levaquin with EOT 02/14/23. Will schedule office appointment one week after patient has finished antibiotics to assess urinary symptoms and recheck urine    Anti-infectives:   Zosyn 01/17 - 01/18  Levaquin (oral) 01/18 -    Subjective:   Date of Consultation:  January 18, 2023  Referring Physician: Dr Juancarlos Kerr, Hospitalist     Patient is a 50 y.o. female with PMH of nephrolithiasis, UTIs, ovarian cancer. She presented to the ER 1/07 with flank pain, chills, fever, nausea, and dysuria. She was recently treated for a UTI on 12/23/22, cx grew E Coli - treated with IM rocephin and macrobid x 7 days. On Sunday she developed right sided flank pain and increased urinary frequency. She reports passing a kidney stone this AM the size of pencil eraser. She has extensive hx of kidney stones and urosepsis, required PICC and OP abx in the past. Is followed by Dr Leila Hernandez outpatient. In the ER she had temp of 102.9., . CT renal with bilateral L > R non-obstructing renal calculi and findings of medullary calcinosis without obstructing renal calculi or hydronephrosis. Urology has been consulted. 01/18: Max temp overnight 102.1. WBC 11.1. Patient resting in bed. Her urinary symptoms of burning and frequency have improved.  She is still having some bilateral flank pain, but pain improved today. She reports being treated for UTI in December and urinary symptoms improved before returning a few days ago. Patient Active Problem List   Diagnosis    Essential hypertension    Bilateral nephrolithiasis    Pyelonephritis, acute    Sepsis secondary to UTI ([address]    BRCA1 gene mutation positive in female    Gastroesophageal reflux disease    Iron deficiency anemia secondary to inadequate dietary iron intake    Malignant neoplasm of right ovary (HCC)    Paroxysmal supraventricular tachycardia (HCC)    Stress incontinence, female    Mixed stress and urge urinary incontinence    Mixed hyperlipidemia    Hiatal hernia    Anxiety disorder    Hot flashes due to surgical menopause     Past Medical History:   Diagnosis Date    Anxiety     managed with medication     BRCA gene mutation positive in female     Cystostomy status ([address] 1/17/2023    Difficult intubation     12/21/16 sore throat and ulcers after intubation     Essential hypertension 11/04/2016    only when septic in 10/2016, takes atenolol for HR    GERD (gastroesophageal reflux disease)     manage with medication    Gram positive septicemia [address] 10/27/2016    Hiatal hernia     History of kidney stones     numerous    Kidney stone     left side    Left ureteral stone 9/13/2017    Midline cystocele 2/22/2016    Mucositis due to chemotherapy 9/8/2019    Formatting of this note might be different from the original. Sore on L lateral tongue consistent with mucositis from chemo.  She had this with her prior cycle Chloraseptic spray ordered    Last Assessment & Plan:  Formatting of this note might be different from the original. Chloraseptic spray prn and orajel    Nausea & vomiting     nausea after anesthesia    Neutropenic fever ([address] 9/8/2019    Formatting of this note might be different from the original. - Patient s/p cycle 2 of carbo/taxol on 8/27 presenting with fever and , demonstrating severe neutropenia.  - Febrile to 101.4 in the ED. Lactate 1.3.  - CXR, UA, and influenza screening negative. No clear source of infection at this time. No localizing symptoms other than dry cough. Blood cultures pending. Respiratory culture to    Paroxysmal SVT (supraventricular tachycardia) ([address]     ablation- [place]    Reactive depression 11/15/2022    Last Assessment & Plan:  Formatting of this note might be different from the original. Condition: stable   Taking wellbutrin with good results. Never seen mental health provider. Condition managed by PCP  Medications: Taking medications as prescribed  If taking medications, do not stop treatment without consulting healthcare provider. If symptoms worsen or do not improve/stabilize, notify health     Rectocele 2/22/2016    Last Assessment & Plan:  Formatting of this note might be different from the original. Occasional splinting with BM's. Will have her evaluated by urogyn regarding need for surgical intervention. I discussed with her that surgery may not be necessary and that the pelvic floor surgeons could advise her regarding this decision. If surgery is warranted, can consider a joint case.     Ureteral stone 12/21/2016      Family History   Problem Relation Age of Onset    No Known Problems Brother     No Known Problems Sister     Cancer Father         melanoma (face)     Hypertension Father     Heart Disease Mother         congenital heart block with celia    Diabetes Father       Social History     Tobacco Use    Smoking status: Never    Smokeless tobacco: Never   Substance Use Topics    Alcohol use: No     Past Surgical History:   Procedure Laterality Date    ABLATION OF DYSRHYTHMIC FOCUS  2003    BLADDER SUSPENSION  04/2016    CLOSE CYSTOSTOMY      for kidney stones    IR NEPHROURETERAL CATHETER PLACEMENT NEW ACCESS  2/13/2019    IR NEPHROURETERAL CATHETER PLACEMENT NEW ACCESS  2/13/2019    IR NEPHROURETERAL CATHETER PLACEMENT NEW ACCESS 2/13/2019 [facility] RADIOLOGY SPECIALS    LITHOTRIPSY W[place] UNLISTED PROCEDURE CARDIAC SURGERY      SVT ablation    TUBAL LIGATION  2006    UROLOGICAL SURGERY  2017, 2017, 2017    cysto x 3     UROLOGICAL SURGERY  2019    stent placement    UROLOGICAL SURGERY      bladder sling      Prior to Admission medications    Medication Sig Start Date End Date Taking? Authorizing Provider   estradiol (ESTRACE) 0.5 MG tablet Take 0.5 mg by mouth daily   Yes Historical Provider, MD   buPROPion (WELLBUTRIN XL) 150 MG extended release tablet Take 150 mg by mouth every morning   Yes Historical Provider, MD   atenolol (TENORMIN) 25 MG tablet Take 25 mg by mouth    Ar Automatic Reconciliation   omeprazole (PRILOSEC OTC) 20 MG tablet Take 20 mg by mouth 2 times daily  Patient not taking: Reported on 2023    Ar Automatic Reconciliation   potassium citrate (UROCIT-K) 10 MEQ (1080 MG) extended release tablet Take 10 mEq by mouth 2 times daily  Patient not taking: Reported on 2023 3/8/21   Ar Automatic Reconciliation       Allergies   Allergen Reactions    Ciprofloxacin Other (See Comments)     \"joint pain\"        Review of Systems:  A comprehensive review of systems is negative except as stated in the HPI. Objective:     Visit Vitals  BP (!) 103/57   Pulse 97   Temp 98.8 °F (37.1 °C) (Oral)   Resp 16   Ht 5' 3\" (1.6 m)   Wt 150 lb 9.2 oz (68.3 kg)   SpO2 96%   BMI 26.67 kg/m²     Temp (24hrs), Av.4 °F (38 °C), Min:98.1 °F (36.7 °C), Max:102.9 °F (39.4 °C)       Lines:  Peripheral IV    Physical Exam:    General:  Alert, cooperative, well noursished, well developed, appears stated age   Eyes:  Sclera anicteric. Pupils equally round and reactive to light. Mouth/Throat: Mucous membranes normal, oral pharynx clear   Neck: Supple   Lungs:   Clear to auscultation bilaterally, good effort   CV:  Regular rate and rhythm,no murmur, click, rub or gallop   Abdomen:   Soft, non-tender.  bowel sounds normal. non-distended   Extremities: No cyanosis or edema   Skin: Skin color, texture, turgor normal. no acute rash or lesions   Lymph nodes: Cervical and supraclavicular normal   Musculoskeletal: No swelling or deformity.  + CVA tenderness    Lines/Devices:  Intact, no erythema, drainage or tenderness   Psych: Alert and oriented, normal mood affect given the setting       Data Review:     CBC:  Recent Labs     01/17/23  1359 01/18/23  0556   WBC 10.3 11.1   HGB 14.5 11.3*   HCT 42.2 32.7*    142*       BMP:  Recent Labs     01/17/23  1359 01/18/23  0556   BUN 16 12    139   K 3.5 3.8    108   CO2 23 21       LFTS:  Recent Labs     01/17/23  1359 01/18/23  0556   ALT 42 27       Microbiology:   Reviewed    Imaging:   Reviewed    Signed By: TERRENCE Segal     January 18, 2023

## 2023-01-19 LAB
ALBUMIN SERPL-MCNC: 2.6 G/DL (ref 3.5–5)
ALBUMIN/GLOB SERPL: 0.8 (ref 0.4–1.6)
ALP SERPL-CCNC: 75 U/L (ref 50–136)
ALT SERPL-CCNC: 36 U/L (ref 12–65)
ANION GAP SERPL CALC-SCNC: 7 MMOL/L (ref 2–11)
AST SERPL-CCNC: 22 U/L (ref 15–37)
BACTERIA SPEC CULT: ABNORMAL
BACTERIA SPEC CULT: ABNORMAL
BASOPHILS # BLD: 0 K/UL (ref 0–0.2)
BASOPHILS NFR BLD: 0 % (ref 0–2)
BILIRUB SERPL-MCNC: 0.3 MG/DL (ref 0.2–1.1)
BUN SERPL-MCNC: 10 MG/DL (ref 6–23)
CALCIUM SERPL-MCNC: 9.2 MG/DL (ref 8.3–10.4)
CHLORIDE SERPL-SCNC: 110 MMOL/L (ref 101–110)
CO2 SERPL-SCNC: 24 MMOL/L (ref 21–32)
CREAT SERPL-MCNC: 0.9 MG/DL (ref 0.6–1)
DIFFERENTIAL METHOD BLD: ABNORMAL
EOSINOPHIL # BLD: 0 K/UL (ref 0–0.8)
EOSINOPHIL NFR BLD: 0 % (ref 0.5–7.8)
ERYTHROCYTE [DISTWIDTH] IN BLOOD BY AUTOMATED COUNT: 11.6 % (ref 11.9–14.6)
GLOBULIN SER CALC-MCNC: 3.2 G/DL (ref 2.8–4.5)
GLUCOSE SERPL-MCNC: 90 MG/DL (ref 65–100)
HCT VFR BLD AUTO: 32 % (ref 35.8–46.3)
HGB BLD-MCNC: 10.9 G/DL (ref 11.7–15.4)
IMM GRANULOCYTES # BLD AUTO: 0 K/UL (ref 0–0.5)
IMM GRANULOCYTES NFR BLD AUTO: 0 % (ref 0–5)
LYMPHOCYTES # BLD: 0.3 K/UL (ref 0.5–4.6)
LYMPHOCYTES NFR BLD: 6 % (ref 13–44)
MAGNESIUM SERPL-MCNC: 1.7 MG/DL (ref 1.8–2.4)
MCH RBC QN AUTO: 30.4 PG (ref 26.1–32.9)
MCHC RBC AUTO-ENTMCNC: 34.1 G/DL (ref 31.4–35)
MCV RBC AUTO: 89.4 FL (ref 82–102)
MONOCYTES # BLD: 0.4 K/UL (ref 0.1–1.3)
MONOCYTES NFR BLD: 9 % (ref 4–12)
NEUTS SEG # BLD: 4 K/UL (ref 1.7–8.2)
NEUTS SEG NFR BLD: 85 % (ref 43–78)
NRBC # BLD: 0 K/UL (ref 0–0.2)
PLATELET # BLD AUTO: 115 K/UL (ref 150–450)
PMV BLD AUTO: 10.5 FL (ref 9.4–12.3)
POTASSIUM SERPL-SCNC: 3.6 MMOL/L (ref 3.5–5.1)
PROT SERPL-MCNC: 5.8 G/DL (ref 6.3–8.2)
RBC # BLD AUTO: 3.58 M/UL (ref 4.05–5.2)
SERVICE CMNT-IMP: ABNORMAL
SODIUM SERPL-SCNC: 141 MMOL/L (ref 133–143)
WBC # BLD AUTO: 4.8 K/UL (ref 4.3–11.1)

## 2023-01-19 PROCEDURE — 6360000002 HC RX W HCPCS: Performed by: FAMILY MEDICINE

## 2023-01-19 PROCEDURE — 6370000000 HC RX 637 (ALT 250 FOR IP)

## 2023-01-19 PROCEDURE — 6370000000 HC RX 637 (ALT 250 FOR IP): Performed by: FAMILY MEDICINE

## 2023-01-19 PROCEDURE — 83735 ASSAY OF MAGNESIUM: CPT

## 2023-01-19 PROCEDURE — 2580000003 HC RX 258: Performed by: FAMILY MEDICINE

## 2023-01-19 PROCEDURE — 6370000000 HC RX 637 (ALT 250 FOR IP): Performed by: HOSPITALIST

## 2023-01-19 PROCEDURE — 85025 COMPLETE CBC W/AUTO DIFF WBC: CPT

## 2023-01-19 PROCEDURE — 6360000002 HC RX W HCPCS: Performed by: HOSPITALIST

## 2023-01-19 PROCEDURE — 80053 COMPREHEN METABOLIC PANEL: CPT

## 2023-01-19 PROCEDURE — 36415 COLL VENOUS BLD VENIPUNCTURE: CPT

## 2023-01-19 PROCEDURE — 1100000000 HC RM PRIVATE

## 2023-01-19 RX ORDER — ATENOLOL 50 MG/1
25 TABLET ORAL DAILY
Status: DISCONTINUED | OUTPATIENT
Start: 2023-01-19 | End: 2023-01-20 | Stop reason: HOSPADM

## 2023-01-19 RX ORDER — MAGNESIUM SULFATE IN WATER 40 MG/ML
2000 INJECTION, SOLUTION INTRAVENOUS ONCE
Status: COMPLETED | OUTPATIENT
Start: 2023-01-19 | End: 2023-01-19

## 2023-01-19 RX ADMIN — TAMSULOSIN HYDROCHLORIDE 0.4 MG: 0.4 CAPSULE ORAL at 07:46

## 2023-01-19 RX ADMIN — MAGNESIUM SULFATE HEPTAHYDRATE 2000 MG: 40 INJECTION, SOLUTION INTRAVENOUS at 09:18

## 2023-01-19 RX ADMIN — SODIUM CHLORIDE, PRESERVATIVE FREE 10 ML: 5 INJECTION INTRAVENOUS at 20:39

## 2023-01-19 RX ADMIN — SODIUM CHLORIDE, POTASSIUM CHLORIDE, SODIUM LACTATE AND CALCIUM CHLORIDE: 600; 310; 30; 20 INJECTION, SOLUTION INTRAVENOUS at 04:39

## 2023-01-19 RX ADMIN — SODIUM CHLORIDE, PRESERVATIVE FREE 5 ML: 5 INJECTION INTRAVENOUS at 07:46

## 2023-01-19 RX ADMIN — ACETAMINOPHEN 650 MG: 325 TABLET ORAL at 13:40

## 2023-01-19 RX ADMIN — BUPROPION HYDROCHLORIDE 150 MG: 150 TABLET, EXTENDED RELEASE ORAL at 07:46

## 2023-01-19 RX ADMIN — ENOXAPARIN SODIUM 40 MG: 100 INJECTION SUBCUTANEOUS at 07:46

## 2023-01-19 RX ADMIN — ATENOLOL 25 MG: 50 TABLET ORAL at 09:18

## 2023-01-19 RX ADMIN — FAMOTIDINE 20 MG: 20 TABLET, FILM COATED ORAL at 20:37

## 2023-01-19 RX ADMIN — ACETAMINOPHEN 650 MG: 325 TABLET ORAL at 20:37

## 2023-01-19 RX ADMIN — LEVOFLOXACIN 500 MG: 500 TABLET, FILM COATED ORAL at 07:46

## 2023-01-19 RX ADMIN — ESTRADIOL 0.5 MG: 1 TABLET ORAL at 07:46

## 2023-01-19 RX ADMIN — ACETAMINOPHEN 650 MG: 325 TABLET ORAL at 07:45

## 2023-01-19 RX ADMIN — FAMOTIDINE 20 MG: 20 TABLET, FILM COATED ORAL at 07:46

## 2023-01-19 ASSESSMENT — PAIN SCALES - GENERAL
PAINLEVEL_OUTOF10: 2
PAINLEVEL_OUTOF10: 0
PAINLEVEL_OUTOF10: 3

## 2023-01-19 ASSESSMENT — PAIN DESCRIPTION - LOCATION
LOCATION: GENERALIZED
LOCATION: HEAD

## 2023-01-19 NOTE — PROGRESS NOTES
Infectious Disease Progress Notes    Today's Date: 1/19/2023   Admit Date: 1/17/2023    Impression:   E Coli bacteremia, blood cx 01/17   Acute pyelonephritis with non obstructing renal calculi bilaterally   UTI, urine cx with GNR   Recent UTI, cx with E Coli treated outpatient with IM ceftriaxone and 7 days Macrobid     Plan:     Continue Levaquin with plan to treat for 4 weeks with EOT 02/14/23. When nausea resolved and able to tolerate po without issues can discharge (?in 24 hrs)  Will schedule office appointment one week after patient has finished antibiotics to assess urinary symptoms and recheck urine    Anti-infectives:   Zosyn 01/17 - 01/18  Levaquin (oral) 01/18 -    Subjective:   Date of Consultation:  January 19, 2023  Referring Physician: Dr Finesse Lares, Hospitalist     Overnight Events:  Slightly better; still some occasional Nausea; no dysuria; Tmax 100.8 overnight    Patient is a 50 y.o. female with PMH of nephrolithiasis, UTIs, ovarian cancer. She presented to the ER 1/07 with flank pain, chills, fever, nausea, and dysuria. She was recently treated for a UTI on 12/23/22, cx grew E Coli - treated with IM rocephin and macrobid x 7 days. On Sunday she developed right sided flank pain and increased urinary frequency. She reports passing a kidney stone this AM the size of pencil eraser. She has extensive hx of kidney stones and urosepsis, required PICC and OP abx in the past. Is followed by Dr Deena Sanders outpatient. In the ER she had temp of 102.9., . CT renal with bilateral L > R non-obstructing renal calculi and findings of medullary calcinosis without obstructing renal calculi or hydronephrosis. Urology has been consulted. 01/18: Max temp overnight 102.1. WBC 11.1. Patient resting in bed. Her urinary symptoms of burning and frequency have improved. She is still having some bilateral flank pain, but pain improved today.  She reports being treated for UTI in December and urinary symptoms improved before returning a few days ago. Allergies   Allergen Reactions    Ciprofloxacin Other (See Comments)     \"joint pain\"        Review of Systems:  A comprehensive review of systems is negative except as stated in the HPI. Objective:     Visit Vitals  /75   Pulse (!) 112   Temp (!) 100.8 °F (38.2 °C) (Oral)   Resp 17   Ht 5' 3\" (1.6 m)   Wt 150 lb 9.2 oz (68.3 kg)   SpO2 97%   BMI 26.67 kg/m²     Temp (24hrs), Av.1 °F (37.8 °C), Min:98.2 °F (36.8 °C), Max:101.7 °F (38.7 °C)       Lines:  Peripheral IV    Physical Exam:    General:  Alert, cooperative, well noursished, well developed, appears stated age   Eyes:  Sclera anicteric. Pupils equally round and reactive to light. Mouth/Throat: Mucous membranes normal, oral pharynx clear   Neck: Supple   Lungs:   Clear to auscultation bilaterally, good effort   CV:  Regular rate and rhythm,no murmur, click, rub or gallop   Abdomen:   Soft, non-tender. bowel sounds normal. non-distended   Extremities: No cyanosis or edema   Skin: Skin color, texture, turgor normal. no acute rash or lesions   Lymph nodes: Cervical and supraclavicular normal   Musculoskeletal: No swelling or deformity.  + CVA tenderness    Lines/Devices:  Intact, no erythema, drainage or tenderness   Psych: Alert and oriented, normal mood affect given the setting       Data Review:     CBC:  Recent Labs     23  1359 23  0556 23  0629   WBC 10.3 11.1 4.8   HGB 14.5 11.3* 10.9*   HCT 42.2 32.7* 32.0*    142* 115*         BMP:  Recent Labs     23  1359 23  0556 23  0629   BUN 16 12 10    139 141   K 3.5 3.8 3.6    108 110   CO2 23 21 24         LFTS:  Recent Labs     23  1359 23  0556 23  0629   ALT 42 27 36         Microbiology:   Reviewed    Imaging:   Reviewed    Signed By: Jennifer Ramsay MD     2023

## 2023-01-19 NOTE — PROGRESS NOTES
Hospitalist Progress Note   Admit Date:  2023  1:39 PM   Name:  Yolanda Pereira   Age:  50 y.o. Sex:  female  :  1975   MRN:  [id number]   Room:  [facility]    Presenting Complaint: Flank Pain     Reason(s) for Admission: Septicemia [facility]) [A41.9]  Acute pyelonephritis [N10]  Sepsis secondary to UTI ([address] [A41.9, N39.0]     Hospital Course:   Yolanda Pereira is a 50 y.o. female with medical history of nephrolithasis, UTIs, BRCA1 with ovarian cancer who presented with flank pain, chills, fever, nausea, dysuria. Treated for UTI on 22. This past  developed right sided flank pain associated with bladder spasms, malodorous urine increased urinary frequency. Kidney stone passed this AM the size of a mechanical pencil eraser. History of stones and UTIs x 25 years. Moved to [facility] in  then in  was septic from UTI and stones and again in 2017 with sepsis requiring PICC line for 2 weeks OP abx. She follows with , urologist. She reports stones run in her family. In the ED, she was febrile 102.9, , RR 22, BP 97/62. WBC 10.2 Calcium 10.5   UA +nitrate, large LE larege blood. CT renal stone protocol -   1. Bilateral left greater than right punctate nonobstructing renal calculi and   findings of medullary calcinosis without obstructing renal calculi or   hydronephrosis. She rec'd 30 cc/kg sepsis bolus, zosyn, toradol, morphine, zofran in the ED. Subjective & 24hr Events (23): Patient is still having fevers. Temperature 101 F at bedside. No chest pain. No abdominal pain. Still has some nausea. Poor appetite. Assessment & Plan: This is a 51y Female with:    Sepsis 2/2 acute pyelonephritis  with non obstructing renal calculi bilaterally.    Recent UTI with E. coli treated with outpatient IM ceftriaxone and 7 days of Macrobid  Febrile, tachycardic, hypotensive, lactic acid 0.6  UA positive and urine cultures positive for E. coli  CT scan showed bilateral left greater than right punctate nonobstructing renal calculi and findings of medullary calcinosis without obstructing renal calculi or hydronephrosis   Zosyn discontinued- history of enterococcus septicemia   Switch to Levaquin EOT 02/14/23. Anti-emtics, pain control. Follow-up with Dr. Edda Davis as outpatient  Urology signed off.  1/19 plan to continue p.o. Levaquin until 2/14/2023. F/U ID as outpatient  to assess urinary symptoms and recheck urine    E. coli bacteremia  Continue Levaquin EOT 02/14/23. Outpatient follow-up with ID 1 week after completing antibiotics to assess urinary symptoms and recheck urine. pSVT  Restart atenolol. 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 followed by Dr. Gerri Molina with [facility] outpatient. Cont estradiol      Mood d/o - resume wellbutrin       Anticipated discharge needs: Anticipate discharge home in 24 to 48 hours. Diet: regular   VTE ppx: lovenox  Code status: Full    Hospital Problems:  Principal Problem:    Sepsis secondary to UTI [facility])  Active Problems:    BRCA1 gene mutation positive in female    Malignant neoplasm of right ovary (HCC)    Paroxysmal supraventricular tachycardia (HCC)    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  *      Objective:   Patient Vitals for the past 24 hrs:   Temp Pulse Resp BP SpO2   01/19/23 1545 99.3 °F (37.4 °C) 89 16 111/76 97 %   01/19/23 1052 98.4 °F (36.9 °C) 81 17 107/69 97 %   01/19/23 0730 (!) 100.8 °F (38.2 °C) (!) 112 17 138/75 97 %   01/19/23 0337 99.1 °F (37.3 °C) (!) 101 18 120/67 96 %   01/18/23 2249 100 °F (37.8 °C) (!) 109 16 122/72 95 %   01/18/23 1935 (!) 101.7 °F (38.7 °C) (!) 114 18 128/74 95 %   01/18/23 1611 (!) 100.6 °F (38.1 °C) (!) 116 17 135/69 99 %       Oxygen Therapy  SpO2: 97 %  O2 Device: None (Room air)  Oxygen Therapy: None (Room air)    Estimated body mass index is 26.67 kg/m² as calculated from the following:    Height as of this encounter: 5' 3\" (1.6 m). Weight as of this encounter: 150 lb 9.2 oz (68.3 kg). Intake/Output Summary (Last 24 hours) at 1/19/2023 1600  Last data filed at 1/19/2023 1433  Gross per 24 hour   Intake --   Output 1000 ml   Net -1000 ml         Physical Exam:     Blood pressure 111/76, pulse 89, temperature 99.3 °F (37.4 °C), resp. rate 16, height 5' 3\" (1.6 m), weight 150 lb 9.2 oz (68.3 kg), SpO2 97 %. General:    Well nourished. Alert awake female, tired looking,  Head:  Normocephalic, atraumatic  Eyes:  Sclerae appear normal.  Pupils equally round. ENT:  Nares appear normal.  Moist oral mucosa  Neck:  No restricted ROM. Trachea midline   CV:   RRR. No m/r/g. No jugular venous distension. Lungs:   CTAB. No wheezing, rhonchi, or rales. Symmetric expansion. Genitourinary right costovertebral angle tenderness is present  Abdomen:   Soft, nontender, nondistended. Extremities: No cyanosis or clubbing. No edema  Skin:     No rashes and normal coloration. Warm and dry. Neuro:  CN II-XII grossly intact. Sensation intact. Psych:  Normal mood and affect.       I have personally reviewed labs and tests showing:  Recent Labs:  Recent Results (from the past 48 hour(s))   Lactic Acid    Collection Time: 01/17/23  7:31 PM   Result Value Ref Range    Lactic Acid, Plasma 0.9 0.4 - 2.0 MMOL/L   Culture, Blood 1    Collection Time: 01/17/23  9:12 PM    Specimen: Blood   Result Value Ref Range    Special Requests RIGHT  Antecubital        Culture NO GROWTH 2 DAYS     CBC with Auto Differential    Collection Time: 01/18/23  5:56 AM   Result Value Ref Range    WBC 11.1 4.3 - 11.1 K/uL    RBC 3.68 (L) 4.05 - 5.2 M/uL    Hemoglobin 11.3 (L) 11.7 - 15.4 g/dL    Hematocrit 32.7 (L) 35.8 - 46.3 %    MCV 88.9 82 - 102 FL    MCH 30.7 26.1 - 32.9 PG    MCHC 34.6 31.4 - 35.0 g/dL    RDW 11.9 11.9 - 14.6 %    Platelets 142 (L) 150 - 450 K/uL    MPV 10.3 9.4 - 12.3 FL    nRBC 0.00 0.0 - 0.2 K/uL    Differential Type AUTOMATED      Seg Neutrophils 90 (H) 43 - 78 %    Lymphocytes 2 (L) 13 - 44 %    Monocytes 7 4.0 - 12.0 %    Eosinophils % 0 (L) 0.5 - 7.8 %    Basophils 0 0.0 - 2.0 %    Immature Granulocytes 0 0.0 - 5.0 %    Segs Absolute 10.0 (H) 1.7 - 8.2 K/UL    Absolute Lymph # 0.3 (L) 0.5 - 4.6 K/UL    Absolute Mono # 0.8 0.1 - 1.3 K/UL    Absolute Eos # 0.0 0.0 - 0.8 K/UL    Basophils Absolute 0.0 0.0 - 0.2 K/UL    Absolute Immature Granulocyte 0.0 0.0 - 0.5 K/UL   Magnesium    Collection Time: 01/18/23  5:56 AM   Result Value Ref Range    Magnesium 2.3 1.8 - 2.4 mg/dL   Comprehensive Metabolic Panel    Collection Time: 01/18/23  5:56 AM   Result Value Ref Range    Sodium 139 133 - 143 mmol/L    Potassium 3.8 3.5 - 5.1 mmol/L    Chloride 108 101 - 110 mmol/L    CO2 21 21 - 32 mmol/L    Anion Gap 10 2 - 11 mmol/L    Glucose 119 (H) 65 - 100 mg/dL    BUN 12 6 - 23 MG/DL    Creatinine 0.90 0.6 - 1.0 MG/DL    Est, Glom Filt Rate >60 >60 ml/min/1.73m2    Calcium 9.0 8.3 - 10.4 MG/DL    Total Bilirubin 0.7 0.2 - 1.1 MG/DL    ALT 27 12 - 65 U/L    AST 19 15 - 37 U/L    Alk Phosphatase 80 50 - 136 U/L    Total Protein 5.5 (L) 6.3 - 8.2 g/dL    Albumin 2.9 (L) 3.5 - 5.0 g/dL    Globulin 2.6 (L) 2.8 - 4.5 g/dL    Albumin/Globulin Ratio 1.1 0.4 - 1.6     Lactic Acid    Collection Time: 01/18/23  5:56 AM   Result Value Ref Range    Lactic Acid, Plasma 0.6 0.4 - 2.0 MMOL/L   PTH, Intact    Collection Time: 01/18/23  5:56 AM   Result Value Ref Range    Calcium 8.8 8.3 - 10.4 MG/DL    Pth Intact 122.6 (H) 18.5 - 88.0 pg/mL   Vitamin D 25 Hydroxy    Collection Time: 01/18/23  5:56 AM   Result Value Ref Range    Vit D, 25-Hydroxy 29.8 (L) 30.0 - 100.0 ng/mL   CBC with Auto Differential    Collection Time: 01/19/23  6:29 AM   Result Value Ref Range    WBC 4.8 4.3 - 11.1 K/uL    RBC 3.58 (L) 4.05 - 5.2 M/uL    Hemoglobin 10.9 (L) 11.7 - 15.4 g/dL Hematocrit 32.0 (L) 35.8 - 46.3 %    MCV 89.4 82 - 102 FL    MCH 30.4 26.1 - 32.9 PG    MCHC 34.1 31.4 - 35.0 g/dL    RDW 11.6 (L) 11.9 - 14.6 %    Platelets 162 (L) 716 - 450 K/uL    MPV 10.5 9.4 - 12.3 FL    nRBC 0.00 0.0 - 0.2 K/uL    Differential Type AUTOMATED      Seg Neutrophils 85 (H) 43 - 78 %    Lymphocytes 6 (L) 13 - 44 %    Monocytes 9 4.0 - 12.0 %    Eosinophils % 0 (L) 0.5 - 7.8 %    Basophils 0 0.0 - 2.0 %    Immature Granulocytes 0 0.0 - 5.0 %    Segs Absolute 4.0 1.7 - 8.2 K/UL    Absolute Lymph # 0.3 (L) 0.5 - 4.6 K/UL    Absolute Mono # 0.4 0.1 - 1.3 K/UL    Absolute Eos # 0.0 0.0 - 0.8 K/UL    Basophils Absolute 0.0 0.0 - 0.2 K/UL    Absolute Immature Granulocyte 0.0 0.0 - 0.5 K/UL   Magnesium    Collection Time: 01/19/23  6:29 AM   Result Value Ref Range    Magnesium 1.7 (L) 1.8 - 2.4 mg/dL   Comprehensive Metabolic Panel    Collection Time: 01/19/23  6:29 AM   Result Value Ref Range    Sodium 141 133 - 143 mmol/L    Potassium 3.6 3.5 - 5.1 mmol/L    Chloride 110 101 - 110 mmol/L    CO2 24 21 - 32 mmol/L    Anion Gap 7 2 - 11 mmol/L    Glucose 90 65 - 100 mg/dL    BUN 10 6 - 23 MG/DL    Creatinine 0.90 0.6 - 1.0 MG/DL    Est, Glom Filt Rate >60 >60 ml/min/1.73m2    Calcium 9.2 8.3 - 10.4 MG/DL    Total Bilirubin 0.3 0.2 - 1.1 MG/DL    ALT 36 12 - 65 U/L    AST 22 15 - 37 U/L    Alk Phosphatase 75 50 - 136 U/L    Total Protein 5.8 (L) 6.3 - 8.2 g/dL    Albumin 2.6 (L) 3.5 - 5.0 g/dL    Globulin 3.2 2.8 - 4.5 g/dL    Albumin/Globulin Ratio 0.8 0.4 - 1.6         I have personally reviewed imaging studies showing: Other Studies:  CT ABDOMEN PELVIS RENAL STONE   Final Result   1. Bilateral left greater than right punctate nonobstructing renal calculi and   findings of medullary calcinosis without obstructing renal calculi or   hydronephrosis.                          Current Meds:  Current Facility-Administered Medications   Medication Dose Route Frequency    atenolol (TENORMIN) tablet 25 mg  25 mg Oral Daily    levoFLOXacin (LEVAQUIN) tablet 500 mg  500 mg Oral Daily    lactated ringers infusion   IntraVENous Continuous    tamsulosin (FLOMAX) capsule 0.4 mg  0.4 mg Oral Daily    ondansetron (ZOFRAN) injection 4 mg  4 mg IntraVENous Q6H PRN    promethazine (PHENERGAN) tablet 25 mg  25 mg Oral Q6H PRN    sodium chloride flush 0.9 % injection 5-40 mL  5-40 mL IntraVENous 2 times per day    sodium chloride flush 0.9 % injection 5-40 mL  5-40 mL IntraVENous PRN    0.9 % sodium chloride infusion   IntraVENous PRN    enoxaparin (LOVENOX) injection 40 mg  40 mg SubCUTAneous Daily    acetaminophen (TYLENOL) tablet 650 mg  650 mg Oral Q6H PRN    Or    acetaminophen (TYLENOL) suppository 650 mg  650 mg Rectal Q6H PRN    aluminum & magnesium hydroxide-simethicone (MAALOX) 200-200-20 MG/5ML suspension 30 mL  30 mL Oral Q6H PRN    naloxone (NARCAN) injection 0.4 mg  0.4 mg IntraVENous PRN    ketorolac (TORADOL) injection 15 mg  15 mg IntraVENous Q6H PRN    famotidine (PEPCID) tablet 20 mg  20 mg Oral BID    HYDROmorphone (DILAUDID) injection 0.5 mg  0.5 mg IntraVENous Q3H PRN    buPROPion (WELLBUTRIN SR) extended release tablet 150 mg  150 mg Oral QAM    estradiol (ESTRACE) tablet 0.5 mg  0.5 mg Oral Daily       Signed:  Lawson Cardenas MD    Part of this note may have been written by using a voice dictation software. The note has been proof read but may still contain some grammatical/other typographical errors.

## 2023-01-20 VITALS
RESPIRATION RATE: 18 BRPM | HEART RATE: 76 BPM | DIASTOLIC BLOOD PRESSURE: 76 MMHG | SYSTOLIC BLOOD PRESSURE: 118 MMHG | HEIGHT: 63 IN | BODY MASS INDEX: 27.11 KG/M2 | TEMPERATURE: 98.6 F | WEIGHT: 153 LBS | OXYGEN SATURATION: 94 %

## 2023-01-20 LAB
ALBUMIN SERPL-MCNC: 2.8 G/DL (ref 3.5–5)
ALBUMIN/GLOB SERPL: 0.9 (ref 0.4–1.6)
ALP SERPL-CCNC: 89 U/L (ref 50–136)
ALT SERPL-CCNC: 29 U/L (ref 12–65)
ANION GAP SERPL CALC-SCNC: 6 MMOL/L (ref 2–11)
AST SERPL-CCNC: 27 U/L (ref 15–37)
BACTERIA SPEC CULT: ABNORMAL
BACTERIA SPEC CULT: ABNORMAL
BASOPHILS # BLD: 0 K/UL (ref 0–0.2)
BASOPHILS NFR BLD: 1 % (ref 0–2)
BILIRUB SERPL-MCNC: 0.2 MG/DL (ref 0.2–1.1)
BUN SERPL-MCNC: 12 MG/DL (ref 6–23)
CALCIUM SERPL-MCNC: 8.6 MG/DL (ref 8.3–10.4)
CHLORIDE SERPL-SCNC: 110 MMOL/L (ref 101–110)
CO2 SERPL-SCNC: 26 MMOL/L (ref 21–32)
CREAT SERPL-MCNC: 0.8 MG/DL (ref 0.6–1)
DIFFERENTIAL METHOD BLD: ABNORMAL
EOSINOPHIL # BLD: 0 K/UL (ref 0–0.8)
EOSINOPHIL NFR BLD: 1 % (ref 0.5–7.8)
ERYTHROCYTE [DISTWIDTH] IN BLOOD BY AUTOMATED COUNT: 11.9 % (ref 11.9–14.6)
GLOBULIN SER CALC-MCNC: 3.2 G/DL (ref 2.8–4.5)
GLUCOSE SERPL-MCNC: 96 MG/DL (ref 65–100)
GRAM STN SPEC: ABNORMAL
HCT VFR BLD AUTO: 30.9 % (ref 35.8–46.3)
HGB BLD-MCNC: 10.7 G/DL (ref 11.7–15.4)
IMM GRANULOCYTES # BLD AUTO: 0 K/UL (ref 0–0.5)
IMM GRANULOCYTES NFR BLD AUTO: 0 % (ref 0–5)
LYMPHOCYTES # BLD: 0.4 K/UL (ref 0.5–4.6)
LYMPHOCYTES NFR BLD: 12 % (ref 13–44)
MAGNESIUM SERPL-MCNC: 1.8 MG/DL (ref 1.8–2.4)
MCH RBC QN AUTO: 30.6 PG (ref 26.1–32.9)
MCHC RBC AUTO-ENTMCNC: 34.6 G/DL (ref 31.4–35)
MCV RBC AUTO: 88.3 FL (ref 82–102)
MONOCYTES # BLD: 0.5 K/UL (ref 0.1–1.3)
MONOCYTES NFR BLD: 13 % (ref 4–12)
NEUTS SEG # BLD: 2.6 K/UL (ref 1.7–8.2)
NEUTS SEG NFR BLD: 73 % (ref 43–78)
NRBC # BLD: 0 K/UL (ref 0–0.2)
PLATELET # BLD AUTO: 124 K/UL (ref 150–450)
PMV BLD AUTO: 10.8 FL (ref 9.4–12.3)
POTASSIUM SERPL-SCNC: 3.5 MMOL/L (ref 3.5–5.1)
PROT SERPL-MCNC: 6 G/DL (ref 6.3–8.2)
RBC # BLD AUTO: 3.5 M/UL (ref 4.05–5.2)
SERVICE CMNT-IMP: ABNORMAL
SODIUM SERPL-SCNC: 142 MMOL/L (ref 133–143)
WBC # BLD AUTO: 3.6 K/UL (ref 4.3–11.1)

## 2023-01-20 PROCEDURE — 6370000000 HC RX 637 (ALT 250 FOR IP)

## 2023-01-20 PROCEDURE — 83735 ASSAY OF MAGNESIUM: CPT

## 2023-01-20 PROCEDURE — 6370000000 HC RX 637 (ALT 250 FOR IP): Performed by: FAMILY MEDICINE

## 2023-01-20 PROCEDURE — 6360000002 HC RX W HCPCS: Performed by: FAMILY MEDICINE

## 2023-01-20 PROCEDURE — 2580000003 HC RX 258: Performed by: FAMILY MEDICINE

## 2023-01-20 PROCEDURE — 85025 COMPLETE CBC W/AUTO DIFF WBC: CPT

## 2023-01-20 PROCEDURE — 80053 COMPREHEN METABOLIC PANEL: CPT

## 2023-01-20 PROCEDURE — 2580000003 HC RX 258: Performed by: HOSPITALIST

## 2023-01-20 PROCEDURE — 6370000000 HC RX 637 (ALT 250 FOR IP): Performed by: HOSPITALIST

## 2023-01-20 PROCEDURE — 36415 COLL VENOUS BLD VENIPUNCTURE: CPT

## 2023-01-20 RX ORDER — LANOLIN ALCOHOL/MO/W.PET/CERES
400 CREAM (GRAM) TOPICAL 2 TIMES DAILY
Qty: 10 TABLET | Refills: 0 | Status: SHIPPED | OUTPATIENT
Start: 2023-01-20 | End: 2023-01-25

## 2023-01-20 RX ORDER — LEVOFLOXACIN 500 MG/1
500 TABLET, FILM COATED ORAL DAILY
Qty: 25 TABLET | Refills: 0 | Status: SHIPPED | OUTPATIENT
Start: 2023-01-21 | End: 2023-02-15

## 2023-01-20 RX ORDER — ONDANSETRON 4 MG/1
4 TABLET, FILM COATED ORAL EVERY 6 HOURS PRN
Qty: 15 TABLET | Refills: 0 | Status: SHIPPED | OUTPATIENT
Start: 2023-01-20 | End: 2023-01-27

## 2023-01-20 RX ORDER — TAMSULOSIN HYDROCHLORIDE 0.4 MG/1
0.4 CAPSULE ORAL DAILY
Qty: 30 CAPSULE | Refills: 0 | Status: SHIPPED | OUTPATIENT
Start: 2023-01-21 | End: 2023-02-20

## 2023-01-20 RX ORDER — LANOLIN ALCOHOL/MO/W.PET/CERES
400 CREAM (GRAM) TOPICAL 2 TIMES DAILY
Status: DISCONTINUED | OUTPATIENT
Start: 2023-01-20 | End: 2023-01-20 | Stop reason: HOSPADM

## 2023-01-20 RX ADMIN — SODIUM CHLORIDE, PRESERVATIVE FREE 5 ML: 5 INJECTION INTRAVENOUS at 08:00

## 2023-01-20 RX ADMIN — Medication 400 MG: at 09:00

## 2023-01-20 RX ADMIN — SODIUM CHLORIDE, POTASSIUM CHLORIDE, SODIUM LACTATE AND CALCIUM CHLORIDE: 600; 310; 30; 20 INJECTION, SOLUTION INTRAVENOUS at 02:00

## 2023-01-20 RX ADMIN — ACETAMINOPHEN 650 MG: 325 TABLET ORAL at 07:59

## 2023-01-20 RX ADMIN — FAMOTIDINE 20 MG: 20 TABLET, FILM COATED ORAL at 07:59

## 2023-01-20 RX ADMIN — ATENOLOL 25 MG: 50 TABLET ORAL at 07:59

## 2023-01-20 RX ADMIN — ESTRADIOL 0.5 MG: 1 TABLET ORAL at 07:59

## 2023-01-20 RX ADMIN — BUPROPION HYDROCHLORIDE 150 MG: 150 TABLET, EXTENDED RELEASE ORAL at 07:59

## 2023-01-20 RX ADMIN — TAMSULOSIN HYDROCHLORIDE 0.4 MG: 0.4 CAPSULE ORAL at 07:59

## 2023-01-20 RX ADMIN — LEVOFLOXACIN 500 MG: 500 TABLET, FILM COATED ORAL at 07:59

## 2023-01-20 RX ADMIN — ONDANSETRON 4 MG: 2 INJECTION INTRAMUSCULAR; INTRAVENOUS at 03:28

## 2023-01-20 RX ADMIN — ENOXAPARIN SODIUM 40 MG: 100 INJECTION SUBCUTANEOUS at 07:59

## 2023-01-20 NOTE — DISCHARGE INSTRUCTIONS
DISCHARGE SUMMARY from Nurse    PATIENT INSTRUCTIONS:    After general anesthesia or intravenous sedation, for 24 hours or while taking prescription Narcotics:  Limit your activities  Do not drive and operate hazardous machinery  Do not make important personal or business decisions  Do  not drink alcoholic beverages  If you have not urinated within 8 hours after discharge, please contact your surgeon on call. Report the following to your surgeon:  Excessive pain, swelling, redness or odor of or around the surgical area  Temperature over 100.5  Nausea and vomiting lasting longer than 4 hours or if unable to take medications  Any signs of decreased circulation or nerve impairment to extremity: change in color, persistent  numbness, tingling, coldness or increase pain  Any questions    What to do at Home:  Recommended activity: activity as tolerated. If you experience any of the following symptoms, call your doctor now or seek immediate medical care if:      Symptoms such as fever, chills, nausea, or vomiting get worse or appear for the first time. You have new pain in your back just below your rib cage. This is called flank pain. There is new blood or pus in your urine. You have any problems with your antibiotic medicine. Watch closely for changes in your health, and be sure to contact your doctor if:    You are not getting better after taking an antibiotic for 2 days. Your symptoms go away but then come back. *  Please give a list of your current medications to your Primary Care Provider. *  Please update this list whenever your medications are discontinued, doses are      changed, or new medications (including over-the-counter products) are added. *  Please carry medication information at all times in case of emergency situations.     These are general instructions for a healthy lifestyle:    No smoking/ No tobacco products/ Avoid exposure to second hand smoke  Surgeon Corina Gutierres Warning:  Quitting smoking now greatly reduces serious risk to your health. Obesity, smoking, and sedentary lifestyle greatly increases your risk for illness    A healthy diet, regular physical exercise & weight monitoring are important for maintaining a healthy lifestyle    You may be retaining fluid if you have a history of heart failure or if you experience any of the following symptoms:  Weight gain of 3 pounds or more overnight or 5 pounds in a week, increased swelling in our hands or feet or shortness of breath while lying flat in bed. Please call your doctor as soon as you notice any of these symptoms; do not wait until your next office visit. The discharge information has been reviewed with the patient. The patient verbalized understanding. Discharge medications reviewed with the patient and appropriate educational materials and side effects teaching were provided.   ___________________________________________________________________________________________________________________________________

## 2023-01-20 NOTE — CARE COORDINATION
Pt is for discharge home today with family and no needs/supportive care orders received for CM at this time. Family to transport. Please consult  if needs arise. 01/18/23 1228   Service Assessment   Patient Orientation Alert and Oriented;Person;Place;Situation   Cognition Alert   History Provided By Patient;Medical Record   Primary Caregiver Self   Support Systems Children;Family Members   Patient's Healthcare Decision Maker is: Legal Next of Nati 67   PCP Verified by CM Yes   Last Visit to PCP Within last 6 months   Prior Functional Level Independent in ADLs/IADLs   Current Functional Level Independent in ADLs/IADLs   Can patient return to prior living arrangement Yes   Ability to make needs known: Good   Family able to assist with home care needs: Yes   Financial Resources Other (Comment)  ([place] Medicaid.)   Social/Functional History   Lives With Spouse   Type of Home Mobile home   Home Layout One level   Home Equipment   (Denies DME use.)   ADL Assistance Independent   Ambulation Assistance Independent   Transfer Assistance Independent   Active  Yes   Mode of Transportation Car   Occupation Self employed   Discharge Planning   Type of [facility] Spouse/Significant Other   Patient expects to be discharged to: Trailer/mobile home   [address] Discharge   Transition of [address] ([address] Discharge None   [address] Provided? No   Mode of Transport at Discharge Other (see comment)  (Family)   Confirm Follow Up Transport Self   Condition of Participation: Discharge Planning   The Plan for Transition of Care is related to the following treatment goals: Return to baseline.

## 2023-01-20 NOTE — PROGRESS NOTES
Pt is resting in bed without any complaints. Hourly rounds completed and all needs met. Bed is low, locked,call light is in reach and pt is encouraged to call for assistance.

## 2023-01-20 NOTE — DISCHARGE SUMMARY
Hospitalist Discharge Summary   Admit Date:  2023  1:39 PM   DC Note date: 2023  Name:  Hellen Callahan   Age:  50 y.o. Sex:  female  :  1975   MRN:  [id number]   Room:  [facility]  PCP:  EVITA Kuhn NP    Presenting Complaint: Flank Pain     Initial Admission Diagnosis: Septicemia ([facility] [A41.9]  Acute pyelonephritis [N10]  Sepsis secondary to UTI ([facility] [A41.9, N39.0]     Problem List for this Hospitalization (present on admission):    Principal Problem:    Sepsis secondary to UTI [facility])  Active Problems:    BRCA1 gene mutation positive in female    Malignant neoplasm of right ovary (HCC)    Paroxysmal supraventricular tachycardia (HCC)    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 [facility] Course: This is a 50 y.o. female with medical history of nephrolithasis, UTIs, BRCA1 with ovarian cancer who presented with flank pain, chills, fever, nausea, dysuria. Treated for UTI on 22. This past  developed right sided flank pain associated with bladder spasms, malodorous urine increased urinary frequency. Kidney stone passed this AM the size of a mechanical pencil eraser. History of stones and UTIs x 25 years. Moved to [place] in  then in 2016 was septic from UTI and stones and again in 2017 with sepsis requiring PICC line for 2 weeks OP abx. She follows with , urologist. She reports stones run in her family. In the ED, she was febrile 102.9, , RR 22, BP 97/62. WBC 10.2 Calcium 10.5   UA +nitrate, large LE larege blood. CT renal stone protocol -   1. Bilateral left greater than right punctate nonobstructing renal calculi and   findings of medullary calcinosis without obstructing renal calculi or   hydronephrosis. She rec'd 30 cc/kg sepsis bolus, zosyn, toradol, morphine, zofran in the ED.      Patient was admitted for sepsis secondary to E. coli pyelonephritis with bacteremia. Blood cultures on admission positive for E. coli. She was initially on Zosyn. ID was consulted. Patient was treated with levofloxacin. With IV antibiotics, IV fluids, patient's condition significantly improved. Recommendations for 4 weeks of oral levofloxacin with end of therapy 2/14/2023. Urology was consulted. Recommends outpatient follow-up for nonobstructing renal calculi bilaterally. Nausea vomiting significantly improved. Patient to follow-up with ID after 4 weeks to assess urinary symptoms and recheck urine at that time. She also has history of carcinoma of the right ovary for which she follows with Dr. Subha Gresham at [facility]. Other chronic medical problems which are stable include paroxysmal SVT and mood disorder. Patient is discharged home today in a stable condition to follow-up primary care physician in 3 to 5 days , ID in 4 weeks and urology in 2 to 3 weeks. Disposition: Home today  Diet: ADULT DIET; Regular  Code Status: Full Code    Follow Ups:   Follow-up Information     EVITA Morfin NP. Go on 1/27/2023. Specialty: Nurse Practitioner  Why: at 9:30  Contact information:  [address] [facility] [address]  Earnestine Winston MD. Schedule an appointment as soon as possible for a   visit in 4 week(s). Specialty: Infectious Diseases  Why: OFFICE WILL CALL PATIENT WITH APPOINTMENT DATE AND TIME. Contact information:  [place] [phone] UROLOGY. Schedule an appointment as soon as possible   for a visit in 2 week(s). Specialty: Urology  Contact information:  [address] [place] [address]  [phone]                     Time spent in patient discharge and coordination 41 minutes. Follow up labs/diagnostics (ultimately defer to outpatient provider):  CBC, BMP in 3 to 5 days.     Plan was discussed with the patient. All questions answered. Patient was stable at time of discharge. Instructions given to call a physician or return if any concerns. Current Discharge Medication List        START taking these medications    Details   tamsulosin (FLOMAX) 0.4 MG capsule Take 1 capsule by mouth daily  Qty: 30 capsule, Refills: 0      magnesium oxide (MAG-OX) 400 (240 Mg) MG tablet Take 1 tablet by mouth 2 times daily for 5 days  Qty: 10 tablet, Refills: 0      levoFLOXacin (LEVAQUIN) 500 MG tablet Take 1 tablet by mouth daily for 25 days  Qty: 25 tablet, Refills: 0      ondansetron (ZOFRAN) 4 MG tablet Take 1 tablet by mouth every 6 hours as needed for Nausea or Vomiting  Qty: 15 tablet, Refills: 0           CONTINUE these medications which have NOT CHANGED    Details   estradiol (ESTRACE) 0.5 MG tablet Take 0.5 mg by mouth daily      buPROPion (WELLBUTRIN XL) 150 MG extended release tablet Take 150 mg by mouth every morning      atenolol (TENORMIN) 25 MG tablet Take 25 mg by mouth      omeprazole (PRILOSEC OTC) 20 MG tablet Take 20 mg by mouth 2 times daily      potassium citrate (UROCIT-K) 10 MEQ (1080 MG) extended release tablet Take 10 mEq by mouth 2 times daily           STOP taking these medications       sulfamethoxazole-trimethoprim (BACTRIM DS;SEPTRA DS) 800-160 MG per tablet Comments:   Reason for Stopping:               Procedures done this admission:  * No surgery found *    Consults this admission:  IP CONSULT TO UROLOGY  IP CONSULT TO INFECTIOUS DISEASES    Echocardiogram results:  No results found for this or any previous visit. Diagnostic Imaging/Tests:   CT ABDOMEN PELVIS RENAL STONE    Result Date: 1/17/2023  1. Bilateral left greater than right punctate nonobstructing renal calculi and findings of medullary calcinosis without obstructing renal calculi or hydronephrosis.         Labs: Results:       BMP, Mg, Phos Recent Labs     01/18/23  0556 01/19/23  0629 01/20/23  0449   NA 139 141 142   K 3.8 3.6 3.5    110 110   CO2 21 24 26   ANIONGAP 10 7 6   BUN 12 10 12   CREATININE 0.90 0.90 0.80   LABGLOM >60 >60 >60   CALCIUM 8.8  9.0 9.2 8.6   GLUCOSE 119* 90 96   MG 2.3 1.7* 1.8      CBC Recent Labs     01/18/23  0556 01/19/23  0629 01/20/23  0449   WBC 11.1 4.8 3.6*   RBC 3.68* 3.58* 3.50*   HGB 11.3* 10.9* 10.7*   HCT 32.7* 32.0* 30.9*   MCV 88.9 89.4 88.3   MCH 30.7 30.4 30.6   MCHC 34.6 34.1 34.6   RDW 11.9 11.6* 11.9   * 115* 124*   MPV 10.3 10.5 10.8   NRBC 0.00 0.00 0.00   SEGS 90* 85* 73   LYMPHOPCT 2* 6* 12*   EOSRELPCT 0* 0* 1   MONOPCT 7 9 13*   BASOPCT 0 0 1   IMMGRAN 0 0 0   SEGSABS 10.0* 4.0 2.6   LYMPHSABS 0.3* 0.3* 0.4*   EOSABS 0.0 0.0 0.0   MONOSABS 0.8 0.4 0.5   BASOSABS 0.0 0.0 0.0   ABSIMMGRAN 0.0 0.0 0.0      LFT Recent Labs     01/18/23  0556 01/19/23  0629 01/20/23  0449   BILITOT 0.7 0.3 0.2   ALKPHOS 80 75 89   AST 19 22 27   ALT 27 36 29   PROT 5.5* 5.8* 6.0*   LABALBU 2.9* 2.6* 2.8*   GLOB 2.6* 3.2 3.2      Cardiac  No results found for: NTPROBNP, TROPHS   Coags No results found for: PROTIME, INR, APTT   A1c No results found for: LABA1C, EAG   Lipids No results found for: CHOL, LDLCALC, LABVLDL, HDL, CHOLHDLRATIO, TRIG   Thyroid  No results found for: TSHELE, DVP3LMA     Most Recent UA Lab Results   Component Value Date/Time    COLORU YELLOW/STRAW 01/17/2023 01:51 PM    APPEARANCE CLOUDY 01/17/2023 01:51 PM    SPECGRAV 1.010 01/17/2023 01:51 PM    LABPH 6.5 01/17/2023 01:51 PM    PROTEINU 100 01/17/2023 01:51 PM    GLUCOSEU Negative 01/17/2023 01:51 PM    KETUA Negative 01/17/2023 01:51 PM    BILIRUBINUR Negative 01/17/2023 01:51 PM    BLOODU MODERATE 01/17/2023 01:51 PM    UROBILINOGEN 0.2 01/17/2023 01:51 PM    NITRU Positive 01/17/2023 01:51 PM    LEUKOCYTESUR LARGE 01/17/2023 01:51 PM    WBCUA  01/17/2023 01:51 PM    RBCUA 3-5 01/17/2023 01:51 PM    EPITHUA 0 01/17/2023 01:51 PM    BACTERIA 4+ 01/17/2023 01:51 PM    LABCAST 0 01/17/2023 01:51 PM    MUCUS 0 01/17/2023 01:51 PM        Recent Labs     01/17/23  2112 01/17/23  1359 01/17/23  1351   CULTURE NO GROWTH 3 DAYS Escherichia coli*  Refer to Blood Culture ID Panel Accession [id number] >100,000 COLONIES/mL Escherichia coli*  <1,000 CFU/ML MIXED SKIN SARAH ISOLATED       All Labs from Last 24 Hrs:  Recent Results (from the past 24 hour(s))   CBC with Auto Differential    Collection Time: 01/20/23  4:49 AM   Result Value Ref Range    WBC 3.6 (L) 4.3 - 11.1 K/uL    RBC 3.50 (L) 4.05 - 5.2 M/uL    Hemoglobin 10.7 (L) 11.7 - 15.4 g/dL    Hematocrit 30.9 (L) 35.8 - 46.3 %    MCV 88.3 82 - 102 FL    MCH 30.6 26.1 - 32.9 PG    MCHC 34.6 31.4 - 35.0 g/dL    RDW 11.9 11.9 - 14.6 %    Platelets 029 (L) 601 - 450 K/uL    MPV 10.8 9.4 - 12.3 FL    nRBC 0.00 0.0 - 0.2 K/uL    Differential Type AUTOMATED      Seg Neutrophils 73 43 - 78 %    Lymphocytes 12 (L) 13 - 44 %    Monocytes 13 (H) 4.0 - 12.0 %    Eosinophils % 1 0.5 - 7.8 %    Basophils 1 0.0 - 2.0 %    Immature Granulocytes 0 0.0 - 5.0 %    Segs Absolute 2.6 1.7 - 8.2 K/UL    Absolute Lymph # 0.4 (L) 0.5 - 4.6 K/UL    Absolute Mono # 0.5 0.1 - 1.3 K/UL    Absolute Eos # 0.0 0.0 - 0.8 K/UL    Basophils Absolute 0.0 0.0 - 0.2 K/UL    Absolute Immature Granulocyte 0.0 0.0 - 0.5 K/UL   Magnesium    Collection Time: 01/20/23  4:49 AM   Result Value Ref Range    Magnesium 1.8 1.8 - 2.4 mg/dL   Comprehensive Metabolic Panel    Collection Time: 01/20/23  4:49 AM   Result Value Ref Range    Sodium 142 133 - 143 mmol/L    Potassium 3.5 3.5 - 5.1 mmol/L    Chloride 110 101 - 110 mmol/L    CO2 26 21 - 32 mmol/L    Anion Gap 6 2 - 11 mmol/L    Glucose 96 65 - 100 mg/dL    BUN 12 6 - 23 MG/DL    Creatinine 0.80 0.6 - 1.0 MG/DL    Est, Glom Filt Rate >60 >60 ml/min/1.73m2    Calcium 8.6 8.3 - 10.4 MG/DL    Total Bilirubin 0.2 0.2 - 1.1 MG/DL    ALT 29 12 - 65 U/L    AST 27 15 - 37 U/L    Alk Phosphatase 89 50 - 136 U/L    Total Protein 6.0 (L) 6.3 - 8.2 g/dL Albumin 2.8 (L) 3.5 - 5.0 g/dL    Globulin 3.2 2.8 - 4.5 g/dL    Albumin/Globulin Ratio 0.9 0.4 - 1.6         Allergies   Allergen Reactions    Ciprofloxacin Other (See Comments)     \"joint pain\"       There is no immunization history on file for this patient. Recent Vital Data:  Patient Vitals for the past 24 hrs:   Temp Pulse Resp BP SpO2   01/20/23 1200 98.6 °F (37 °C) 76 18 118/76 94 %   01/20/23 0711 99.5 °F (37.5 °C) 97 16 123/88 97 %   01/20/23 0436 99.5 °F (37.5 °C) 95 18 121/79 96 %   01/19/23 2316 99 °F (37.2 °C) 85 20 104/73 96 %   01/19/23 1916 98.4 °F (36.9 °C) 98 20 118/75 99 %   01/19/23 1545 99.3 °F (37.4 °C) 89 16 111/76 97 %       Oxygen Therapy  SpO2: 94 %  O2 Device: None (Room air)  Oxygen Therapy: None (Room air)    Estimated body mass index is 27.1 kg/m² as calculated from the following:    Height as of this encounter: 5' 3\" (1.6 m). Weight as of this encounter: 153 lb (69.4 kg). Intake/Output Summary (Last 24 hours) at 1/20/2023 1242  Last data filed at 1/19/2023 1433  Gross per 24 hour   Intake --   Output 1000 ml   Net -1000 ml         Physical Exam:    General:    Well nourished. Alert awake female, no overt distress  Head:  Normocephalic, atraumatic  Eyes:  Sclerae appear normal.  Pupils equally round. HENT:  Nares appear normal, no drainage. Moist mucous membranes  Neck:  No restricted ROM. Trachea midline  CV:   RRR. No m/r/g. No JVD  Lungs:   CTAB. No wheezing, rhonchi, or rales. Respirations even, unlabored  Abdomen:   Soft, nontender, nondistended. Extremities: Warm and dry. No cyanosis or clubbing. No edema. Skin:     No rashes. Normal coloration  Neuro:  CN II-XII grossly intact. Psych:  Normal mood and affect. Signed:  Hemant Plenty, MD    Part of this note may have been written by using a voice dictation software. The note has been proof read but may still contain some grammatical/other typographical errors.

## 2023-01-22 LAB
BACTERIA SPEC CULT: NORMAL
SERVICE CMNT-IMP: NORMAL

## 2023-01-25 ENCOUNTER — OFFICE VISIT (OUTPATIENT)
Dept: UROLOGY | Age: 48
End: 2023-01-25
Payer: MEDICAID

## 2023-01-25 DIAGNOSIS — N20.0 CALCULUS OF KIDNEY: Primary | ICD-10-CM

## 2023-01-25 DIAGNOSIS — N39.0 URINARY TRACT INFECTION WITHOUT HEMATURIA, SITE UNSPECIFIED: ICD-10-CM

## 2023-01-25 LAB
BILIRUBIN, URINE, POC: NEGATIVE
BLOOD URINE, POC: NEGATIVE
GLUCOSE URINE, POC: NEGATIVE
KETONES, URINE, POC: NEGATIVE
LEUKOCYTE ESTERASE, URINE, POC: NORMAL
NITRITE, URINE, POC: NEGATIVE
PH, URINE, POC: 7 (ref 4.6–8)
PROTEIN,URINE, POC: NEGATIVE
SPECIFIC GRAVITY, URINE, POC: 1.02 (ref 1–1.03)
URINALYSIS CLARITY, POC: NORMAL
URINALYSIS COLOR, POC: NORMAL
UROBILINOGEN, POC: NORMAL

## 2023-01-25 PROCEDURE — 99214 OFFICE O/P EST MOD 30 MIN: CPT | Performed by: UROLOGY

## 2023-01-25 PROCEDURE — 81003 URINALYSIS AUTO W/O SCOPE: CPT | Performed by: UROLOGY

## 2023-01-25 ASSESSMENT — ENCOUNTER SYMPTOMS: NAUSEA: 0

## 2023-01-25 NOTE — PROGRESS NOTES
[facility] Urology  [facility]\"" [address], [address]  [phone]          Blossom Frazier  : 1975    Chief Complaint   Patient presents with    Follow-up          HPI     Blossom Frazier is a 50 y.o. female followed in regards to nephrolithiasis, UTI's, and 2 episodes of sepsis from obstructing ureteral stones. The first was in . The 2nd was in  and was due to a 5 mm L ureteral stone. Stent was placed emergently 17. Blood culture/urine culture returned Enterococcus. ID was involved and she is still on IV PCN. cystoscopy, LEFT ureteroscopy, laser lithotripsy, LEFT ureteral stent exchange was performed 17. She also has had an issue with recurrent UTI's since sling placement in . She underwent cystoscopy, LEFT ureteroscopy, laser lithotripsy, LEFT ureteral stent placement by Dr. Perry 10/21/17.      24 hour Erman Carpenter was performed in . PTH and serum Ca were normal.  UOP was 1600 ml and citrate levels were low. She began urocitK 10 meq bid and increased fluid intake. Repeat 24 hour urorisk revealed UOP increase to 2.7L daily AND citrate was normalized to 567 from 248! She underwent L ESWL 18 and did pass fragments. KUB at visit in  revealed a new 2.5 cm R renal pelvis stone and 15 mm LLP renal stone. PTH and Calcium levels were both rechecked and normal.   She underwent R PCNL 19 and L ureteroscopy 19. She has since been diagnosed with ovarian cancer and underwent first B oophorectomy and then full hysterectomy. Hysterectomy was on 19. She had chemotherapy and B prophylactic mastectomy. She is followed by Dr. Torrey Ndiaye, Oncology and was cancer free by Pet Scan in . In regards to her stones, she has seen Dr. Mango Mathis at [facility]. KUB revealed a 12 mm LLP renal stone and 3 mm R renal stone. She underwent  L PCNL at [facility] in .       Recurrent UTI's have been an issue lately. She was admitted 1/17/23-1/20/23 for UTI after passing stone. CT 1/17/23 revealed 2 punctate L renal stones (1 RUP, 1 RLP). PVR: 11/16/17 0 ml          Past Medical History:   Diagnosis Date    Anxiety     managed with medication     BRCA gene mutation positive in female     Cystostomy status ([address] 1/17/2023    Difficult intubation     12/21/16 sore throat and ulcers after intubation     Essential hypertension 11/04/2016    only when septic in 10/2016, takes atenolol for HR    GERD (gastroesophageal reflux disease)     manage with medication    Gram positive septicemia ([address] 10/27/2016    Hiatal hernia     History of kidney stones     numerous    Kidney stone     left side    Left ureteral stone 9/13/2017    Midline cystocele 2/22/2016    Mucositis due to chemotherapy 9/8/2019    Formatting of this note might be different from the original. Sore on L lateral tongue consistent with mucositis from chemo. She had this with her prior cycle Chloraseptic spray ordered    Last Assessment & Plan:  Formatting of this note might be different from the original. Chloraseptic spray prn and orajel    Nausea & vomiting     nausea after anesthesia    Neutropenic fever ([address] 9/8/2019    Formatting of this note might be different from the original. - Patient s/p cycle 2 of carbo/taxol on 8/27 presenting with fever and , demonstrating severe neutropenia.  - Febrile to 101.4 in the ED. Lactate 1.3.  - CXR, UA, and influenza screening negative. No clear source of infection at this time. No localizing symptoms other than dry cough. Blood cultures pending. Respiratory culture to    Paroxysmal SVT (supraventricular tachycardia) [address]     ablation- [place]    Reactive depression 11/15/2022    Last Assessment & Plan:  Formatting of this note might be different from the original. Condition: stable   Taking wellbutrin with good results. Never seen mental health provider.  Condition managed by PCP  Medications: Taking medications as prescribed  If taking medications, do not stop treatment without consulting healthcare provider. If symptoms worsen or do not improve/stabilize, notify health     Rectocele 2/22/2016    Last Assessment & Plan:  Formatting of this note might be different from the original. Occasional splinting with BM's. Will have her evaluated by urogyn regarding need for surgical intervention. I discussed with her that surgery may not be necessary and that the pelvic floor surgeons could advise her regarding this decision. If surgery is warranted, can consider a joint case.     Ureteral stone 12/21/2016     Past Surgical History:   Procedure Laterality Date    ABLATION OF DYSRHYTHMIC FOCUS  2003    BLADDER SUSPENSION  04/2016    CLOSE CYSTOSTOMY      for kidney stones    IR NEPHROURETERAL CATHETER PLACEMENT NEW ACCESS  2/13/2019    IR NEPHROURETERAL CATHETER PLACEMENT NEW ACCESS  2/13/2019    IR NEPHROURETERAL CATHETER PLACEMENT NEW ACCESS 2/13/2019 [facility] RADIOLOGY SPECIALS    LITHOTRIPSY      [place] UNLISTED PROCEDURE CARDIAC SURGERY  2003    SVT ablation    TUBAL LIGATION  2006    UROLOGICAL SURGERY  1/2017, 9/2017, 9/2017    cysto x 3     UROLOGICAL SURGERY  02/2019    stent placement    UROLOGICAL SURGERY  2015    bladder sling     Current Outpatient Medications   Medication Sig Dispense Refill    tamsulosin (FLOMAX) 0.4 MG capsule Take 1 capsule by mouth daily 30 capsule 0    magnesium oxide (MAG-OX) 400 (240 Mg) MG tablet Take 1 tablet by mouth 2 times daily for 5 days 10 tablet 0    levoFLOXacin (LEVAQUIN) 500 MG tablet Take 1 tablet by mouth daily for 25 days 25 tablet 0    ondansetron (ZOFRAN) 4 MG tablet Take 1 tablet by mouth every 6 hours as needed for Nausea or Vomiting 15 tablet 0    estradiol (ESTRACE) 0.5 MG tablet Take 0.5 mg by mouth daily      buPROPion (WELLBUTRIN XL) 150 MG extended release tablet Take 150 mg by mouth every morning      atenolol (TENORMIN) 25 MG tablet Take 25 mg by mouth omeprazole (PRILOSEC OTC) 20 MG tablet Take 20 mg by mouth 2 times daily      potassium citrate (UROCIT-K) 10 MEQ (1080 MG) extended release tablet Take 10 mEq by mouth 2 times daily       No current facility-administered medications for this visit. Allergies   Allergen Reactions    Ciprofloxacin Other (See Comments)     \"joint pain\"     Social History     Socioeconomic History    Marital status:      Spouse name: Not on file    Number of children: Not on file    Years of education: Not on file    Highest education level: Not on file   Occupational History    Not on file   Tobacco Use    Smoking status: Never    Smokeless tobacco: Never   Vaping Use    Vaping Use: Never used   Substance and Sexual Activity    Alcohol use: No    Drug use: No    Sexual activity: Not on file   Other Topics Concern    Not on file   Social History Narrative    Not on file     Social Determinants of Health     Financial Resource Strain: Not on file   Food Insecurity: Not on file   Transportation Needs: Not on file   Physical Activity: Not on file   Stress: Not on file   Social Connections: Not on file   Intimate Partner Violence: Not on file   Housing Stability: Not on file     Family History   Problem Relation Age of Onset    No Known Problems Brother     No Known Problems Sister     Cancer Father         melanoma (face)     Hypertension Father     Heart Disease Mother         congenital heart block with celia    Diabetes Father        Review of Systems  Constitutional:   Negative for fever. GI:  Negative for nausea. Genitourinary: Positive for recurrent UTIs and history of urolithiasis. Negative for flank pain.     Urinalysis  UA - Dipstick  Results for orders placed or performed in visit on 01/25/23   AMB POC URINALYSIS DIP STICK AUTO W/O MICRO   Result Value Ref Range    Color (UA POC)      Clarity (UA POC)      Glucose, Urine, POC Negative Negative    Bilirubin, Urine, POC Negative Negative    KETONES, Urine, POC Negative Negative    Specific Gravity, Urine, POC 1.020 1.001 - 1.035    Blood (UA POC) Negative Negative    pH, Urine, POC 7.0 4.6 - 8.0    Protein, Urine, POC Negative Negative    Urobilinogen, POC 0.2 mg/dL     Nitrite, Urine, POC Negative Negative    Leukocyte Esterase, Urine, POC Trace Negative       There were no vitals taken for this visit. GENERAL: NAD, ALERT, A&O x 3, GAIT NORMAL  LUNGS: easy work of breathing  ABDOMEN: soft, non tender  NEUROLOGIC: cranial nerves 2-12 grossly intact           Assessment and Plan    ICD-10-CM    1. Calculus of kidney  N20.0 AMB POC URINALYSIS DIP STICK AUTO W/O MICRO      2. Urinary tract infection without hematuria, site unspecified  N39.0 AMB POC URINALYSIS DIP STICK AUTO W/O MICRO          Recent CT was reviewed with her. She sees ID in 3-4 weeks. I will see her back in 1 year with kub.

## 2023-12-27 ENCOUNTER — APPOINTMENT (OUTPATIENT)
Dept: GENERAL RADIOLOGY | Age: 48
End: 2023-12-27
Payer: MEDICAID

## 2023-12-27 ENCOUNTER — ANESTHESIA (OUTPATIENT)
Dept: SURGERY | Age: 48
End: 2023-12-27
Payer: MEDICAID

## 2023-12-27 ENCOUNTER — ANESTHESIA EVENT (OUTPATIENT)
Dept: SURGERY | Age: 48
End: 2023-12-27
Payer: MEDICAID

## 2023-12-27 ENCOUNTER — HOSPITAL ENCOUNTER (INPATIENT)
Age: 48
LOS: 3 days | Discharge: HOME | End: 2023-12-30
Attending: STUDENT IN AN ORGANIZED HEALTH CARE EDUCATION/TRAINING PROGRAM
Payer: MEDICAID

## 2023-12-27 ENCOUNTER — APPOINTMENT (OUTPATIENT)
Dept: CT IMAGING | Age: 48
End: 2023-12-27
Payer: MEDICAID

## 2023-12-27 DIAGNOSIS — A41.9 SEPTICEMIA (HCC): ICD-10-CM

## 2023-12-27 DIAGNOSIS — N20.1 URETERAL STONE: ICD-10-CM

## 2023-12-27 DIAGNOSIS — N20.0 KIDNEY STONE: Primary | ICD-10-CM

## 2023-12-27 LAB
ALBUMIN SERPL-MCNC: 4.6 G/DL (ref 3.5–5)
ALBUMIN/GLOB SERPL: 1.4 (ref 0.4–1.6)
ALP SERPL-CCNC: 114 U/L (ref 50–136)
ALT SERPL-CCNC: 26 U/L (ref 12–65)
ANION GAP SERPL CALC-SCNC: 7 MMOL/L (ref 2–11)
APPEARANCE UR: ABNORMAL
AST SERPL-CCNC: 23 U/L (ref 15–37)
BACTERIA URNS QL MICRO: ABNORMAL /HPF
BASOPHILS # BLD: 0 K/UL (ref 0–0.2)
BASOPHILS NFR BLD: 0 % (ref 0–2)
BILIRUB SERPL-MCNC: 0.4 MG/DL (ref 0.2–1.1)
BILIRUB UR QL: NEGATIVE
BUN SERPL-MCNC: 15 MG/DL (ref 6–23)
CALCIUM SERPL-MCNC: 9.2 MG/DL (ref 8.3–10.4)
CHLORIDE SERPL-SCNC: 107 MMOL/L (ref 103–113)
CO2 SERPL-SCNC: 25 MMOL/L (ref 21–32)
COLOR UR: ABNORMAL
CREAT SERPL-MCNC: 1 MG/DL (ref 0.6–1)
DIFFERENTIAL METHOD BLD: ABNORMAL
EKG ATRIAL RATE: 149 BPM
EKG DIAGNOSIS: NORMAL
EKG P AXIS: 67 DEGREES
EKG P-R INTERVAL: 100 MS
EKG Q-T INTERVAL: 284 MS
EKG QRS DURATION: 106 MS
EKG QTC CALCULATION (BAZETT): 448 MS
EKG R AXIS: 260 DEGREES
EKG T AXIS: -88 DEGREES
EKG VENTRICULAR RATE: 149 BPM
EOSINOPHIL # BLD: 0 K/UL (ref 0–0.8)
EOSINOPHIL NFR BLD: 0 % (ref 0.5–7.8)
EPI CELLS #/AREA URNS HPF: ABNORMAL /HPF
ERYTHROCYTE [DISTWIDTH] IN BLOOD BY AUTOMATED COUNT: 12.8 % (ref 11.9–14.6)
GLOBULIN SER CALC-MCNC: 3.3 G/DL (ref 2.8–4.5)
GLUCOSE SERPL-MCNC: 113 MG/DL (ref 65–100)
GLUCOSE UR STRIP.AUTO-MCNC: NEGATIVE MG/DL
HCT VFR BLD AUTO: 42 % (ref 35.8–46.3)
HGB BLD-MCNC: 14.2 G/DL (ref 11.7–15.4)
HGB UR QL STRIP: ABNORMAL
IMM GRANULOCYTES # BLD AUTO: 0 K/UL (ref 0–0.5)
IMM GRANULOCYTES NFR BLD AUTO: 0 % (ref 0–5)
KETONES UR QL STRIP.AUTO: NEGATIVE MG/DL
LACTATE SERPL-SCNC: 1 MMOL/L (ref 0.4–2)
LEUKOCYTE ESTERASE UR QL STRIP.AUTO: ABNORMAL
LYMPHOCYTES # BLD: 0.4 K/UL (ref 0.5–4.6)
LYMPHOCYTES NFR BLD: 4 % (ref 13–44)
MAGNESIUM SERPL-MCNC: 2 MG/DL (ref 1.8–2.4)
MCH RBC QN AUTO: 29.1 PG (ref 26.1–32.9)
MCHC RBC AUTO-ENTMCNC: 33.8 G/DL (ref 31.4–35)
MCV RBC AUTO: 86.1 FL (ref 82–102)
MONOCYTES # BLD: 0.6 K/UL (ref 0.1–1.3)
MONOCYTES NFR BLD: 7 % (ref 4–12)
NEUTS SEG # BLD: 7.8 K/UL (ref 1.7–8.2)
NEUTS SEG NFR BLD: 89 % (ref 43–78)
NITRITE UR QL STRIP.AUTO: POSITIVE
NRBC # BLD: 0 K/UL (ref 0–0.2)
OTHER OBSERVATIONS: ABNORMAL
PH UR STRIP: 6.5 (ref 5–9)
PLATELET # BLD AUTO: 199 K/UL (ref 150–450)
PMV BLD AUTO: 10.5 FL (ref 9.4–12.3)
POTASSIUM SERPL-SCNC: 2.9 MMOL/L (ref 3.5–5.1)
PROCALCITONIN SERPL-MCNC: <0.05 NG/ML (ref 0–0.49)
PROT SERPL-MCNC: 7.9 G/DL (ref 6.3–8.2)
PROT UR STRIP-MCNC: 30 MG/DL
RBC # BLD AUTO: 4.88 M/UL (ref 4.05–5.2)
RBC #/AREA URNS HPF: ABNORMAL /HPF
SODIUM SERPL-SCNC: 139 MMOL/L (ref 136–146)
SP GR UR REFRACTOMETRY: 1.02 (ref 1–1.02)
UROBILINOGEN UR QL STRIP.AUTO: 0.2 EU/DL (ref 0.2–1)
WBC # BLD AUTO: 8.9 K/UL (ref 4.3–11.1)
WBC URNS QL MICRO: ABNORMAL /HPF

## 2023-12-27 PROCEDURE — 3600000004 HC SURGERY LEVEL 4 BASE: Performed by: UROLOGY

## 2023-12-27 PROCEDURE — 6360000002 HC RX W HCPCS: Performed by: ANESTHESIOLOGY

## 2023-12-27 PROCEDURE — 0T768DZ DILATION OF RIGHT URETER WITH INTRALUMINAL DEVICE, VIA NATURAL OR ARTIFICIAL OPENING ENDOSCOPIC: ICD-10-PCS | Performed by: UROLOGY

## 2023-12-27 PROCEDURE — 6370000000 HC RX 637 (ALT 250 FOR IP): Performed by: STUDENT IN AN ORGANIZED HEALTH CARE EDUCATION/TRAINING PROGRAM

## 2023-12-27 PROCEDURE — 85025 COMPLETE CBC W/AUTO DIFF WBC: CPT

## 2023-12-27 PROCEDURE — 2580000003 HC RX 258: Performed by: STUDENT IN AN ORGANIZED HEALTH CARE EDUCATION/TRAINING PROGRAM

## 2023-12-27 PROCEDURE — 87040 BLOOD CULTURE FOR BACTERIA: CPT

## 2023-12-27 PROCEDURE — 84145 PROCALCITONIN (PCT): CPT

## 2023-12-27 PROCEDURE — 6370000000 HC RX 637 (ALT 250 FOR IP): Performed by: ANESTHESIOLOGY

## 2023-12-27 PROCEDURE — 71045 X-RAY EXAM CHEST 1 VIEW: CPT

## 2023-12-27 PROCEDURE — 83605 ASSAY OF LACTIC ACID: CPT

## 2023-12-27 PROCEDURE — 93010 ELECTROCARDIOGRAM REPORT: CPT | Performed by: INTERNAL MEDICINE

## 2023-12-27 PROCEDURE — 7100000000 HC PACU RECOVERY - FIRST 15 MIN: Performed by: UROLOGY

## 2023-12-27 PROCEDURE — 3700000001 HC ADD 15 MINUTES (ANESTHESIA): Performed by: UROLOGY

## 2023-12-27 PROCEDURE — 2580000003 HC RX 258

## 2023-12-27 PROCEDURE — 6360000002 HC RX W HCPCS: Performed by: UROLOGY

## 2023-12-27 PROCEDURE — 52332 CYSTOSCOPY AND TREATMENT: CPT | Performed by: UROLOGY

## 2023-12-27 PROCEDURE — C1769 GUIDE WIRE: HCPCS | Performed by: UROLOGY

## 2023-12-27 PROCEDURE — 2580000003 HC RX 258: Performed by: UROLOGY

## 2023-12-27 PROCEDURE — C2617 STENT, NON-COR, TEM W/O DEL: HCPCS | Performed by: UROLOGY

## 2023-12-27 PROCEDURE — 81001 URINALYSIS AUTO W/SCOPE: CPT

## 2023-12-27 PROCEDURE — 6360000002 HC RX W HCPCS

## 2023-12-27 PROCEDURE — 99254 IP/OBS CNSLTJ NEW/EST MOD 60: CPT | Performed by: UROLOGY

## 2023-12-27 PROCEDURE — 93005 ELECTROCARDIOGRAM TRACING: CPT | Performed by: STUDENT IN AN ORGANIZED HEALTH CARE EDUCATION/TRAINING PROGRAM

## 2023-12-27 PROCEDURE — 1100000003 HC PRIVATE W/ TELEMETRY

## 2023-12-27 PROCEDURE — 96375 TX/PRO/DX INJ NEW DRUG ADDON: CPT

## 2023-12-27 PROCEDURE — 3600000014 HC SURGERY LEVEL 4 ADDTL 15MIN: Performed by: UROLOGY

## 2023-12-27 PROCEDURE — 3700000000 HC ANESTHESIA ATTENDED CARE: Performed by: UROLOGY

## 2023-12-27 PROCEDURE — 99285 EMERGENCY DEPT VISIT HI MDM: CPT

## 2023-12-27 PROCEDURE — 2709999900 HC NON-CHARGEABLE SUPPLY: Performed by: UROLOGY

## 2023-12-27 PROCEDURE — 6360000002 HC RX W HCPCS: Performed by: STUDENT IN AN ORGANIZED HEALTH CARE EDUCATION/TRAINING PROGRAM

## 2023-12-27 PROCEDURE — 96374 THER/PROPH/DIAG INJ IV PUSH: CPT

## 2023-12-27 PROCEDURE — 74176 CT ABD & PELVIS W/O CONTRAST: CPT

## 2023-12-27 PROCEDURE — 80053 COMPREHEN METABOLIC PANEL: CPT

## 2023-12-27 PROCEDURE — 7100000001 HC PACU RECOVERY - ADDTL 15 MIN: Performed by: UROLOGY

## 2023-12-27 PROCEDURE — 2500000003 HC RX 250 WO HCPCS

## 2023-12-27 PROCEDURE — 83735 ASSAY OF MAGNESIUM: CPT

## 2023-12-27 DEVICE — URETERAL STENT
Type: IMPLANTABLE DEVICE | Site: URETER | Status: FUNCTIONAL
Brand: PERCUFLEX™ PLUS

## 2023-12-27 RX ORDER — SODIUM CHLORIDE 9 MG/ML
INJECTION, SOLUTION INTRAVENOUS CONTINUOUS
Status: ACTIVE | OUTPATIENT
Start: 2023-12-27 | End: 2023-12-29

## 2023-12-27 RX ORDER — TAMSULOSIN HYDROCHLORIDE 0.4 MG/1
0.4 CAPSULE ORAL DAILY
Status: DISCONTINUED | OUTPATIENT
Start: 2023-12-27 | End: 2023-12-30 | Stop reason: HOSPADM

## 2023-12-27 RX ORDER — KETOROLAC TROMETHAMINE 30 MG/ML
30 INJECTION, SOLUTION INTRAMUSCULAR; INTRAVENOUS EVERY 6 HOURS PRN
Status: DISCONTINUED | OUTPATIENT
Start: 2023-12-27 | End: 2023-12-30 | Stop reason: HOSPADM

## 2023-12-27 RX ORDER — OXYCODONE HYDROCHLORIDE 5 MG/1
5 TABLET ORAL
Status: DISCONTINUED | OUTPATIENT
Start: 2023-12-27 | End: 2023-12-27 | Stop reason: HOSPADM

## 2023-12-27 RX ORDER — SODIUM CHLORIDE 0.9 % (FLUSH) 0.9 %
5-40 SYRINGE (ML) INJECTION EVERY 12 HOURS SCHEDULED
Status: DISCONTINUED | OUTPATIENT
Start: 2023-12-27 | End: 2023-12-30 | Stop reason: HOSPADM

## 2023-12-27 RX ORDER — ACETAMINOPHEN 650 MG/1
650 SUPPOSITORY RECTAL EVERY 6 HOURS PRN
Status: DISCONTINUED | OUTPATIENT
Start: 2023-12-27 | End: 2023-12-30 | Stop reason: HOSPADM

## 2023-12-27 RX ORDER — ACETAMINOPHEN 325 MG/1
650 TABLET ORAL EVERY 6 HOURS PRN
Status: DISCONTINUED | OUTPATIENT
Start: 2023-12-27 | End: 2023-12-30 | Stop reason: HOSPADM

## 2023-12-27 RX ORDER — HYDROMORPHONE HYDROCHLORIDE 2 MG/ML
0.5 INJECTION, SOLUTION INTRAMUSCULAR; INTRAVENOUS; SUBCUTANEOUS EVERY 10 MIN PRN
Status: DISCONTINUED | OUTPATIENT
Start: 2023-12-27 | End: 2023-12-27 | Stop reason: HOSPADM

## 2023-12-27 RX ORDER — POLYETHYLENE GLYCOL 3350 17 G/17G
17 POWDER, FOR SOLUTION ORAL DAILY PRN
Status: DISCONTINUED | OUTPATIENT
Start: 2023-12-27 | End: 2023-12-30 | Stop reason: HOSPADM

## 2023-12-27 RX ORDER — SODIUM CHLORIDE, SODIUM LACTATE, POTASSIUM CHLORIDE, AND CALCIUM CHLORIDE .6; .31; .03; .02 G/100ML; G/100ML; G/100ML; G/100ML
30 INJECTION, SOLUTION INTRAVENOUS ONCE
Status: COMPLETED | OUTPATIENT
Start: 2023-12-27 | End: 2023-12-27

## 2023-12-27 RX ORDER — ACETAMINOPHEN 500 MG
1000 TABLET ORAL
Status: COMPLETED | OUTPATIENT
Start: 2023-12-27 | End: 2023-12-27

## 2023-12-27 RX ORDER — ENOXAPARIN SODIUM 100 MG/ML
40 INJECTION SUBCUTANEOUS EVERY 24 HOURS
Status: DISCONTINUED | OUTPATIENT
Start: 2023-12-27 | End: 2023-12-30 | Stop reason: HOSPADM

## 2023-12-27 RX ORDER — MORPHINE SULFATE 4 MG/ML
4 INJECTION, SOLUTION INTRAMUSCULAR; INTRAVENOUS
Status: COMPLETED | OUTPATIENT
Start: 2023-12-27 | End: 2023-12-27

## 2023-12-27 RX ORDER — MORPHINE SULFATE 2 MG/ML
2 INJECTION, SOLUTION INTRAMUSCULAR; INTRAVENOUS EVERY 4 HOURS PRN
Status: DISCONTINUED | OUTPATIENT
Start: 2023-12-27 | End: 2023-12-30 | Stop reason: HOSPADM

## 2023-12-27 RX ORDER — PHENYLEPHRINE HYDROCHLORIDE 10 MG/ML
INJECTION INTRAVENOUS PRN
Status: DISCONTINUED | OUTPATIENT
Start: 2023-12-27 | End: 2023-12-27 | Stop reason: SDUPTHER

## 2023-12-27 RX ORDER — POTASSIUM CHLORIDE 7.45 MG/ML
10 INJECTION INTRAVENOUS
Status: COMPLETED | OUTPATIENT
Start: 2023-12-27 | End: 2023-12-27

## 2023-12-27 RX ORDER — ONDANSETRON 2 MG/ML
4 INJECTION INTRAMUSCULAR; INTRAVENOUS EVERY 6 HOURS PRN
Status: DISCONTINUED | OUTPATIENT
Start: 2023-12-27 | End: 2023-12-30 | Stop reason: HOSPADM

## 2023-12-27 RX ORDER — HYDROCODONE BITARTRATE AND ACETAMINOPHEN 5; 325 MG/1; MG/1
1 TABLET ORAL EVERY 4 HOURS PRN
Status: DISCONTINUED | OUTPATIENT
Start: 2023-12-27 | End: 2023-12-30 | Stop reason: HOSPADM

## 2023-12-27 RX ORDER — SODIUM CHLORIDE 0.9 % (FLUSH) 0.9 %
5-40 SYRINGE (ML) INJECTION PRN
Status: DISCONTINUED | OUTPATIENT
Start: 2023-12-27 | End: 2023-12-30 | Stop reason: HOSPADM

## 2023-12-27 RX ORDER — SODIUM CHLORIDE, SODIUM LACTATE, POTASSIUM CHLORIDE, CALCIUM CHLORIDE 600; 310; 30; 20 MG/100ML; MG/100ML; MG/100ML; MG/100ML
INJECTION, SOLUTION INTRAVENOUS CONTINUOUS PRN
Status: DISCONTINUED | OUTPATIENT
Start: 2023-12-27 | End: 2023-12-27 | Stop reason: SDUPTHER

## 2023-12-27 RX ORDER — SODIUM CHLORIDE 0.9 % (FLUSH) 0.9 %
5-40 SYRINGE (ML) INJECTION PRN
Status: DISCONTINUED | OUTPATIENT
Start: 2023-12-27 | End: 2023-12-27 | Stop reason: HOSPADM

## 2023-12-27 RX ORDER — SODIUM CHLORIDE 9 MG/ML
INJECTION, SOLUTION INTRAVENOUS PRN
Status: DISCONTINUED | OUTPATIENT
Start: 2023-12-27 | End: 2023-12-30 | Stop reason: HOSPADM

## 2023-12-27 RX ORDER — FENTANYL CITRATE 50 UG/ML
50 INJECTION, SOLUTION INTRAMUSCULAR; INTRAVENOUS EVERY 5 MIN PRN
Status: DISCONTINUED | OUTPATIENT
Start: 2023-12-27 | End: 2023-12-27 | Stop reason: HOSPADM

## 2023-12-27 RX ORDER — ONDANSETRON 2 MG/ML
INJECTION INTRAMUSCULAR; INTRAVENOUS PRN
Status: DISCONTINUED | OUTPATIENT
Start: 2023-12-27 | End: 2023-12-27 | Stop reason: SDUPTHER

## 2023-12-27 RX ORDER — SCOLOPAMINE TRANSDERMAL SYSTEM 1 MG/1
1 PATCH, EXTENDED RELEASE TRANSDERMAL
Status: DISCONTINUED | OUTPATIENT
Start: 2023-12-27 | End: 2023-12-27 | Stop reason: HOSPADM

## 2023-12-27 RX ORDER — SODIUM CHLORIDE 0.9 % (FLUSH) 0.9 %
5-40 SYRINGE (ML) INJECTION EVERY 12 HOURS SCHEDULED
Status: DISCONTINUED | OUTPATIENT
Start: 2023-12-27 | End: 2023-12-27 | Stop reason: HOSPADM

## 2023-12-27 RX ORDER — ATENOLOL 50 MG/1
25 TABLET ORAL DAILY
Status: DISCONTINUED | OUTPATIENT
Start: 2023-12-27 | End: 2023-12-30 | Stop reason: HOSPADM

## 2023-12-27 RX ORDER — PROPOFOL 10 MG/ML
INJECTION, EMULSION INTRAVENOUS PRN
Status: DISCONTINUED | OUTPATIENT
Start: 2023-12-27 | End: 2023-12-27 | Stop reason: SDUPTHER

## 2023-12-27 RX ORDER — BUPROPION HYDROCHLORIDE 150 MG/1
150 TABLET ORAL EVERY MORNING
Status: DISCONTINUED | OUTPATIENT
Start: 2023-12-27 | End: 2023-12-30 | Stop reason: HOSPADM

## 2023-12-27 RX ORDER — ONDANSETRON 2 MG/ML
4 INJECTION INTRAMUSCULAR; INTRAVENOUS ONCE
Status: COMPLETED | OUTPATIENT
Start: 2023-12-27 | End: 2023-12-27

## 2023-12-27 RX ORDER — ACETAMINOPHEN 500 MG
1000 TABLET ORAL ONCE
Status: DISCONTINUED | OUTPATIENT
Start: 2023-12-27 | End: 2023-12-27 | Stop reason: RX

## 2023-12-27 RX ORDER — LIDOCAINE HYDROCHLORIDE 20 MG/ML
INJECTION, SOLUTION EPIDURAL; INFILTRATION; INTRACAUDAL; PERINEURAL PRN
Status: DISCONTINUED | OUTPATIENT
Start: 2023-12-27 | End: 2023-12-27 | Stop reason: SDUPTHER

## 2023-12-27 RX ORDER — ONDANSETRON 4 MG/1
4 TABLET, ORALLY DISINTEGRATING ORAL EVERY 8 HOURS PRN
Status: DISCONTINUED | OUTPATIENT
Start: 2023-12-27 | End: 2023-12-30 | Stop reason: HOSPADM

## 2023-12-27 RX ORDER — HALOPERIDOL 5 MG/ML
1 INJECTION INTRAMUSCULAR
Status: DISCONTINUED | OUTPATIENT
Start: 2023-12-27 | End: 2023-12-27 | Stop reason: HOSPADM

## 2023-12-27 RX ORDER — ONDANSETRON 2 MG/ML
4 INJECTION INTRAMUSCULAR; INTRAVENOUS
Status: DISCONTINUED | OUTPATIENT
Start: 2023-12-27 | End: 2023-12-27 | Stop reason: HOSPADM

## 2023-12-27 RX ORDER — IPRATROPIUM BROMIDE AND ALBUTEROL SULFATE 2.5; .5 MG/3ML; MG/3ML
1 SOLUTION RESPIRATORY (INHALATION)
Status: DISCONTINUED | OUTPATIENT
Start: 2023-12-27 | End: 2023-12-27 | Stop reason: HOSPADM

## 2023-12-27 RX ADMIN — POTASSIUM CHLORIDE 10 MEQ: 7.46 INJECTION, SOLUTION INTRAVENOUS at 12:06

## 2023-12-27 RX ADMIN — ONDANSETRON 4 MG: 2 INJECTION INTRAMUSCULAR; INTRAVENOUS at 05:49

## 2023-12-27 RX ADMIN — POTASSIUM CHLORIDE 10 MEQ: 7.46 INJECTION, SOLUTION INTRAVENOUS at 13:11

## 2023-12-27 RX ADMIN — POTASSIUM CHLORIDE 10 MEQ: 7.46 INJECTION, SOLUTION INTRAVENOUS at 11:11

## 2023-12-27 RX ADMIN — SODIUM CHLORIDE: 9 INJECTION, SOLUTION INTRAVENOUS at 12:49

## 2023-12-27 RX ADMIN — KETOROLAC TROMETHAMINE 30 MG: 30 INJECTION, SOLUTION INTRAMUSCULAR; INTRAVENOUS at 13:00

## 2023-12-27 RX ADMIN — SODIUM CHLORIDE: 9 INJECTION, SOLUTION INTRAVENOUS at 16:56

## 2023-12-27 RX ADMIN — ACETAMINOPHEN 1000 MG: 500 TABLET, FILM COATED ORAL at 05:48

## 2023-12-27 RX ADMIN — ACETAMINOPHEN 650 MG: 325 TABLET ORAL at 17:06

## 2023-12-27 RX ADMIN — PHENYLEPHRINE HYDROCHLORIDE 100 MCG: 10 INJECTION INTRAVENOUS at 11:04

## 2023-12-27 RX ADMIN — FENTANYL CITRATE 25 MCG: 50 INJECTION INTRAMUSCULAR; INTRAVENOUS at 11:06

## 2023-12-27 RX ADMIN — SODIUM CHLORIDE, PRESERVATIVE FREE 10 ML: 5 INJECTION INTRAVENOUS at 20:57

## 2023-12-27 RX ADMIN — PHENYLEPHRINE HYDROCHLORIDE 200 MCG: 10 INJECTION INTRAVENOUS at 11:19

## 2023-12-27 RX ADMIN — PROPOFOL 200 MG: 10 INJECTION, EMULSION INTRAVENOUS at 11:03

## 2023-12-27 RX ADMIN — LIDOCAINE HYDROCHLORIDE 100 MG: 20 INJECTION, SOLUTION EPIDURAL; INFILTRATION; INTRACAUDAL; PERINEURAL at 11:03

## 2023-12-27 RX ADMIN — MORPHINE SULFATE 4 MG: 4 INJECTION INTRAVENOUS at 05:52

## 2023-12-27 RX ADMIN — BUPROPION HYDROCHLORIDE 150 MG: 150 TABLET, EXTENDED RELEASE ORAL at 12:38

## 2023-12-27 RX ADMIN — POTASSIUM CHLORIDE 10 MEQ: 7.46 INJECTION, SOLUTION INTRAVENOUS at 08:05

## 2023-12-27 RX ADMIN — ATENOLOL 25 MG: 50 TABLET ORAL at 10:54

## 2023-12-27 RX ADMIN — WATER 1000 MG: 1 INJECTION INTRAMUSCULAR; INTRAVENOUS; SUBCUTANEOUS at 06:08

## 2023-12-27 RX ADMIN — TAMSULOSIN HYDROCHLORIDE 0.4 MG: 0.4 CAPSULE ORAL at 12:38

## 2023-12-27 RX ADMIN — SODIUM CHLORIDE, SODIUM LACTATE, POTASSIUM CHLORIDE, AND CALCIUM CHLORIDE: 600; 310; 30; 20 INJECTION, SOLUTION INTRAVENOUS at 11:25

## 2023-12-27 RX ADMIN — ACETAMINOPHEN 650 MG: 325 TABLET ORAL at 10:55

## 2023-12-27 RX ADMIN — POTASSIUM CHLORIDE 10 MEQ: 7.46 INJECTION, SOLUTION INTRAVENOUS at 09:15

## 2023-12-27 RX ADMIN — FENTANYL CITRATE 25 MCG: 50 INJECTION INTRAMUSCULAR; INTRAVENOUS at 11:11

## 2023-12-27 RX ADMIN — KETOROLAC TROMETHAMINE 30 MG: 30 INJECTION, SOLUTION INTRAMUSCULAR; INTRAVENOUS at 21:48

## 2023-12-27 RX ADMIN — ONDANSETRON 4 MG: 2 INJECTION INTRAMUSCULAR; INTRAVENOUS at 11:11

## 2023-12-27 RX ADMIN — SODIUM CHLORIDE, SODIUM LACTATE, POTASSIUM CHLORIDE, AND CALCIUM CHLORIDE: 600; 310; 30; 20 INJECTION, SOLUTION INTRAVENOUS at 10:54

## 2023-12-27 RX ADMIN — PHENYLEPHRINE HYDROCHLORIDE 100 MCG: 10 INJECTION INTRAVENOUS at 11:10

## 2023-12-27 RX ADMIN — PHENYLEPHRINE HYDROCHLORIDE 100 MCG: 10 INJECTION INTRAVENOUS at 11:14

## 2023-12-27 RX ADMIN — SODIUM CHLORIDE, POTASSIUM CHLORIDE, SODIUM LACTATE AND CALCIUM CHLORIDE 2082 ML: 600; 310; 30; 20 INJECTION, SOLUTION INTRAVENOUS at 05:45

## 2023-12-27 ASSESSMENT — PAIN - FUNCTIONAL ASSESSMENT
PAIN_FUNCTIONAL_ASSESSMENT: 0-10
PAIN_FUNCTIONAL_ASSESSMENT: NONE - DENIES PAIN

## 2023-12-27 ASSESSMENT — PAIN SCALES - GENERAL
PAINLEVEL_OUTOF10: 4
PAINLEVEL_OUTOF10: 1
PAINLEVEL_OUTOF10: 4
PAINLEVEL_OUTOF10: 6
PAINLEVEL_OUTOF10: 5
PAINLEVEL_OUTOF10: 0

## 2023-12-27 ASSESSMENT — PAIN DESCRIPTION - DESCRIPTORS
DESCRIPTORS: POUNDING
DESCRIPTORS: ACHING
DESCRIPTORS: SHOOTING
DESCRIPTORS: ACHING

## 2023-12-27 ASSESSMENT — PAIN DESCRIPTION - LOCATION
LOCATION: HEAD
LOCATION: FLANK
LOCATION: HEAD
LOCATION: HEAD

## 2023-12-27 ASSESSMENT — PAIN DESCRIPTION - ORIENTATION: ORIENTATION: RIGHT

## 2023-12-27 NOTE — PERIOP NOTE
TRANSFER - IN REPORT:    Verbal report received from [address] on  Isai Roth  being received from ED for routine progression of patient care      Report consisted of patient's Situation, Background, Assessment and   Recommendations(SBAR). Information from the following report(s) ED SBAR was reviewed with the receiving nurse. Opportunity for questions and clarification was provided. Assessment to be completed upon patient's arrival to unit and care to be assumed.

## 2023-12-27 NOTE — CONSULTS
CONSULT                      Date: 12/27/2023        Patient Name: Amada Rico     YOB: 1975      Age:  48 y.o.      History of Present Illness     48 y.o.   female  patient of Dr. Marcum known to our practice with hx of nephrolithiasis, UTI's, and 2 episodes of sepsis from obstructing ureteral stones.  The first was in '16.        The 2nd was in 9/17 and was due to a 5 mm L ureteral stone.  Stent was placed emergently 9/14/17.  Blood culture/urine culture returned Enterococcus.  ID was involved and she is still on IV PCN.  cystoscopy, LEFT ureteroscopy, laser lithotripsy, LEFT ureteral stent exchange was performed 9/25/17.        She also has had an issue with recurrent UTI's since sling placement in April '16.        She underwent cystoscopy, LEFT ureteroscopy, laser lithotripsy, LEFT ureteral stent placement by Dr. Malone 10/21/17.    She underwent L ESWL 6/29/18 and did pass fragments.      KUB at visit in 1/19 revealed a new 2.5 cm R renal pelvis stone and 15 mm LLP renal stone.  PTH and Calcium levels were both rechecked and normal.   She underwent R PCNL 2/13/19 and L ureteroscopy 2/16/19.       She has since been diagnosed with ovarian cancer and underwent first B oophorectomy and then full hysterectomy.  Hysterectomy was on 6/14/19.  She had chemotherapy and B prophylactic mastectomy.   She is followed by Dr. Oliver, Oncology and was cancer free by Pet Scan in 11/19.       In regards to her stones, she has seen Dr. Covarrubias at [facility].  KUB revealed a 12 mm LLP renal stone and 3 mm R renal stone.  She underwent  L PCNL at [facility] in 7/19.     Urology consult for nephrolithiasis. Pt presented to ED with right flank pain since Monday and fever overnight. CT reveals 5mm right distal ureteral stone with mod-severe hydroureteronephrosis. Patient states pain currently well controlled.    Past Medical History     Past Medical History:   Diagnosis Date    Anxiety     managed with medication   Magnesium 2.0 1.8 - 2.4 mg/dL   Blood Culture 2    Collection Time: 12/27/23  5:41 AM    Specimen: Blood   Result Value Ref Range    Special Requests LEFT  Antecubital        Culture PENDING    Urinalysis w/Reflex to Micro    Collection Time: 12/27/23  6:12 AM   Result Value Ref Range    Color, UA YELLOW/STRAW      Appearance CLOUDY      Specific Gravity, UA 1.022 1.001 - 1.023      pH, Urine 6.5 5.0 - 9.0      Protein, UA 30 (A) NEG mg/dL    Glucose, UA Negative mg/dL    Ketones, Urine Negative NEG mg/dL    Bilirubin Urine Negative NEG      Blood, Urine TRACE (A) NEG      Urobilinogen, Urine 0.2 0.2 - 1.0 EU/dL    Nitrite, Urine Positive (A) NEG      Leukocyte Esterase, Urine SMALL (A) NEG      WBC, UA 5-10 0 /hpf    RBC, UA 3-5 0 /hpf    Epithelial Cells UA 3-5 0 /hpf    BACTERIA, URINE 4+ (H) 0 /hpf    Other observations RESULTS VERIFIED MANUALLY          Imaging/Diagnostics Last 24 Hours     CT ABDOMEN PELVIS RENAL STONE    Result Date: 12/27/2023  CT ABDOMEN AND PELVIS INDICATION: Right flank pain with fever Multiple axial images were obtained through the abdomen and pelvis without intravenous or oral contrast.  Radiation dose reduction techniques were used for this study:  All CT scans performed at this facility use one or all of the following: Automated exposure control, adjustment of the mA and/or kVp according to patient's size, iterative reconstruction. COMPARISON: CT abdomen pelvis January 17, 2023 FINDINGS: - KIDNEYS/URETERS: Obstructing right distal ureteral 5 mm stone with moderate to severe associated hydroureteronephrosis and perinephric stranding. Bilateral medullary calcinosis. Additional punctate nonobstructing bilateral renal stones. - BLADDER: Normal. - LUNG BASES: No infiltrates or masses. - LIVER: Normal in size and appearance.  - GALLBLADDER/BILE DUCTS: No gallstones or bile duct dilatation. - PANCREAS: Normal. - SPLEEN: Normal. - ADRENALS: Normal. - REPRODUCTIVE ORGANS: No pelvic masses. -  BOWEL: Normal caliber. No inflammatory changes. Colonic diverticulosis - LYMPH NODES: No significant retroperitoneal, mesenteric, or pelvic adenopathy. - BONES: No fracture or significant bone lesion. - VASCULATURE: Normal - OTHER: No ascites. Obstructing right distal ureteral 5 mm stone with moderate to severe associated hydroureteronephrosis and perinephric stranding. Bilateral medullary calcinosis. Colonic diverticulosis. XR CHEST PORTABLE    Result Date: 12/27/2023  Chest X-ray INDICATION: Sepsis Technique: Frontal view chest COMPARISON: X-ray chest April 3, 2022 FINDINGS: The lungs are clear. There are no infiltrates or effusions. The heart size is normal.  The bony thorax is intact. No acute findings in the chest       Assessment      Principal Problem:    Nephrolithiasis  Resolved Problems:    * No resolved hospital problems. *      5mm right distal ureteral stone with hydroureteronephrosis. Fever of 102.5F. WBC 8.9. Creat 1.0. UA pos nitrites. Plan   NPO. To OR this AM for cystoscopy with right ureteral stent placement. The risks, benefits, complications, treatment options, and expected outcomes were discussed with the patient. The patient understands the risks, any and all questions were answered to the patient's satisfaction. Electronically signed by STUART Elliott on 12/27/23 at 8:38 AM EST  [address] [place] [address] Urology    Phone: [phone]    I have reviewed the above note and examined the patient. I agree with the exam, assessment and plan. Pt reports R flank pain starting 12/25 and fevers and chills beginning early this am.  Tc 102.5F.  VSS. Labs reviewed. CT shows a 5mm R distal ureteral stone. R distal ureteral stone and urosepsis. Pan cultured. Rocephin given. I recommended urgent cystoscopy and R ureteral stent placement in OR.   All risks, benefits and alternatives to the above mentioned procedure were discussed and the patient is

## 2023-12-27 NOTE — ED NOTES
TRANSFER - OUT REPORT:    Verbal report given to Evans Moran RN on  Isai Roth  being transferred to Pre-op for routine progression of patient care       Report consisted of patient's Situation, Background, Assessment and   Recommendations(SBAR). Information from the following report(s) Nurse Handoff Report and ED SBAR was reviewed with the receiving nurse. [place] Fall Assessment:    Presents to emergency department  because of falls (Syncope, seizure, or loss of consciousness): No  Age > 70: No  Altered Mental Status, Intoxication with alcohol or substance confusion (Disorientation, impaired judgment, poor safety awaremess, or inability to follow instructions): No  Impaired Mobility: Ambulates or transfers with assistive devices or assistance; Unable to ambulate or transer.: No  Nursing Judgement: No          Lines:   Peripheral IV 12/27/23 Right Antecubital (Active)       Peripheral IV 12/27/23 Left Antecubital (Active)        Opportunity for questions and clarification was provided.       Patient transported with:  transport          [place], [place]  12/27/23 [id number]

## 2023-12-27 NOTE — H&P
Hospitalist History and Physical   Admit Date:  2023  4:53 AM   Name:  Amada Rico   Age:  48 y.o.  Sex:  female  :  1975   MRN:  [id number]   Room:  [facility]/PL    Presenting/Chief Complaint: Flank Pain     Reason(s) for Admission: Kidney stone [N20.0]  Nephrolithiasis [N20.0]  Septicemia (HCC) [A41.9]     History of Present Illness:   Amada Rico is a 48 y.o. female with medical history of tachycardia,who presented with right flank pain. The pain has been ongoing for the last 2 days but significantly worsened within the last 24 hours. Symptoms were associated with nausea but no vomiting. The pain is sharp, right flank located and non radiating.       In the ED, HR was in the 160's, with Tmax of 102.5 noted. Labs showed K of 2.9, with UA positive for nitrite and leukocyte esterase. CT abdomen showed Obstructing right distal ureteral 5 mm stone with moderate to severe associated hydroureteronephrosis and perinephric stranding. Patient admitted for further management     Assessment & Plan:     Principal Problem:    #Kidney stone    #UTI  -UA positive for nitrite and leukocyte esterase.   -CT abdomen showed Obstructing right distal ureteral 5 mm stone with moderate to severe associated hydroureteronephrosis and perinephric stranding.  -NPO   -Flomax  -morphine, Toradol for pain  -started on rocephin  -Urology consulted    #Hypokalemia  -K:2.9  -repleted    #Tachycardia  -On atenolol at home, resumed        PT/OT evals and PPD needed/ordered?  No  Diet: ADULT DIET; Regular  VTE prophylaxis: Lovenox  Code status: Full Code      Non-peripheral Lines and Tubes (if present):             Hospital Problems:  Principal Problem:    Kidney stone  Resolved Problems:    * No resolved hospital problems. *      Past History:     Past Medical History:   Diagnosis Date    Anxiety     managed with medication     BRCA gene mutation positive in female     Cystostomy status ([facility]) 2023    Difficult  0.49 ng/mL   Magnesium    Collection Time: 12/27/23  5:20 AM   Result Value Ref Range    Magnesium 2.0 1.8 - 2.4 mg/dL   Blood Culture 2    Collection Time: 12/27/23  5:41 AM    Specimen: Blood   Result Value Ref Range    Special Requests LEFT  Antecubital        Culture PENDING    Urinalysis w/Reflex to Micro    Collection Time: 12/27/23  6:12 AM   Result Value Ref Range    Color, UA YELLOW/STRAW      Appearance CLOUDY      Specific Gravity, UA 1.022 1.001 - 1.023      pH, Urine 6.5 5.0 - 9.0      Protein, UA 30 (A) NEG mg/dL    Glucose, UA Negative mg/dL    Ketones, Urine Negative NEG mg/dL    Bilirubin Urine Negative NEG      Blood, Urine TRACE (A) NEG      Urobilinogen, Urine 0.2 0.2 - 1.0 EU/dL    Nitrite, Urine Positive (A) NEG      Leukocyte Esterase, Urine SMALL (A) NEG      WBC, UA 5-10 0 /hpf    RBC, UA 3-5 0 /hpf    Epithelial Cells UA 3-5 0 /hpf    BACTERIA, URINE 4+ (H) 0 /hpf    Other observations RESULTS VERIFIED MANUALLY         I have personally reviewed imaging studies:  [facility] XR TECHNOLOGIST SERVICE    Result Date: 12/27/2023  Radiology exam is complete. No Radiologist dictation. Please follow up with ordering provider. CT ABDOMEN PELVIS RENAL STONE    Result Date: 12/27/2023  CT ABDOMEN AND PELVIS INDICATION: Right flank pain with fever Multiple axial images were obtained through the abdomen and pelvis without intravenous or oral contrast.  Radiation dose reduction techniques were used for this study: All CT scans performed at this facility use one or all of the following: Automated exposure control, adjustment of the mA and/or kVp according to patient's size, iterative reconstruction. COMPARISON: CT abdomen pelvis January 17, 2023 FINDINGS: - KIDNEYS/URETERS: Obstructing right distal ureteral 5 mm stone with moderate to severe associated hydroureteronephrosis and perinephric stranding. Bilateral medullary calcinosis. Additional punctate nonobstructing bilateral renal stones.  - BLADDER: Normal.

## 2023-12-27 NOTE — OP NOTE
[facility]  OPERATIVE REPORT    Name:  Denise Pinedo  MR#:  [id number]  :  1975  ACCOUNT #:  [de-identified]  DATE OF SERVICE:  2023    PREOPERATIVE DIAGNOSIS:  Right distal ureteral stone and febrile urinary tract infection. POSTOPERATIVE DIAGNOSIS:  Right distal ureteral stone and febrile urinary tract infection. PROCEDURE PERFORMED:  Cystoscopy and right ureteral stent placement. SURGEON:  Lawyer Andres Clarke DO    ASSISTANT:  None. ANESTHESIA:  General.    COMPLICATIONS:  None immediate. SPECIMENS REMOVED:  None. IMPLANTS:  Right ureteral stent. ESTIMATED BLOOD LOSS:  None. CLINICAL HISTORY:  This is a 68-year-old female who presents with right flank pain and fever. CT scan performed earlier today shows a 5-mm right distal ureteral stone. All risks, benefits and alternatives to above-mentioned procedure have been discussed and she is willing to proceed at this time. PROCEDURE:  Patient consent was obtained. The patient was brought back to the operating room, at which time she was placed in the supine position. After the uneventful induction of general anesthesia, she was then placed in a dorsal lithotomy position. Her genital area was prepped and draped and a sterile field applied. A 22-Iranian cystoscope was passed into urethra and advanced into the bladder under direct visualization. The urethra was unremarkable. The bladder was systematically surveyed. Mild scattered cystitis was noted throughout the bladder. No concerning mucosal abnormalities were seen. Single bilateral ureteral orifices were seen in normal orthotopic position. A guidewire was passed up the right collecting system without difficulty and efflux of mild debris filled urine was noted which quickly cleared. A 6 x 24 double-J ureteral stent was then passed under cystoscopic and fluoroscopic guidance. Excellent curl was noted proximally as well as distally.   The patient's

## 2023-12-27 NOTE — ED TRIAGE NOTES
Patient states history of kidney stones, states had what felt like one passing on Monday, states began running a fever today. States has history of sepsis and did not want it to progress to there.

## 2023-12-27 NOTE — BRIEF OP NOTE
Brief Postoperative Note      Patient: Lesli Dubose  YOB: 1975  MRN: [id number]    Date of Procedure: 12/27/2023    Preop Dx:  R distal ureteral stone/fever    Post-Op Diagnosis: Same       Procedure(s):  CYSTOSCOPY RIGHT URETERAL STENT INSERTION    Surgeon(s):  Janae Clarke DO    Assistant:  * No surgical staff found *    Anesthesia: General    Estimated Blood Loss (mL): Nil    Complications: none immediate    Specimens:   * No specimens in log *    Implants:  Implant Name Type Inv.  Item Serial No.  Lot No. LRB No. Used Action   STENT URET 6FR L24CM HYDR+ GRAD CIRCUMFERENTIAL MRK LO PROF - [id number]  STENT URET 6FR L24CM HYDR+ GRAD CIRCUMFERENTIAL MRK LO PROF  [employer] [facility] UROLOGY- [id number] 1 Implanted         Drains: * No LDAs found *    Findings: see op note      Electronically signed by Taran Rubin DO on 12/27/2023 at 11:24 AM

## 2023-12-27 NOTE — CARE COORDINATION
Patient lives with spouse and 16years old daughter. She is independent, works and drives. POA, PCP, and insurance confirmed. No needs identified at this time. 12/27/23 0903   Service Assessment   Patient Orientation Alert and Oriented   Cognition Alert   History Provided By Patient   Primary Caregiver Self   Accompanied By/Relationship spouse   Support Systems Spouse/Significant Other;Children   Patient's Healthcare Decision Maker is: Legal Next of Kin   PCP Verified by CM Yes   Last Visit to PCP Within last year   Prior Functional Level Independent in ADLs/IADLs   Current Functional Level Independent in ADLs/IADLs   Can patient return to prior living arrangement Yes   Ability to make needs known: Good   Family able to assist with home care needs: Yes   Would you like for me to discuss the discharge plan with any other family members/significant others, and if so, who? No   Financial Resources [employer] None   CM/SW Referral Other (see comment)  (none)   Social/Functional History   Lives With Spouse;Daughter   Type of Home Mobile home   Home Layout One level   Home Access Stairs to enter with rails   Entrance Stairs - Number of Steps 3   Bathroom Shower/Tub Tub/Shower unit   Bathroom Toilet Standard   Bathroom Equipment None   Bathroom Accessibility Accessible   Home Equipment None   Receives Help From Other (comment)  (patient is independent)   ADL Assistance Independent   Homemaking Assistance Independent   Homemaking Responsibilities Yes   Ambulation Assistance Independent   Transfer Assistance Independent   Active  Yes   Occupation Full time employment   Type of Occupation cleans houses   Discharge Planning   Type of Residence Other (Comment)  (double wide mobile home)   Living Arrangements Spouse/Significant Other;Children   Current Services Prior To Admission None   Potential Assistance Needed N/A   DME Ordered?  No   Potential Assistance Purchasing Medications No   Type of Home Care Services None   One/Two Story Residence One story   History of falls? 0   Services At/After Discharge   Transition of Care Consult (CM Consult) Discharge Planning   Services At/After Discharge None    Resource Information Provided? No   Mode of Transport at Discharge Self   Confirm Follow Up Transport Family   Condition of Participation: Discharge Planning   The Plan for Transition of Care is related to the following treatment goals: based on clinical course   The Patient and/or Patient Representative was provided with a Choice of Provider? Patient   The Patient and/Or Patient Representative agree with the Discharge Plan? Yes   Freedom of Choice list was provided with basic dialogue that supports the patient's individualized plan of care/goals, treatment preferences, and shares the quality data associated with the providers?  Yes

## 2023-12-27 NOTE — ACP (ADVANCE CARE PLANNING)
Advance Care Planning   Healthcare Decision Maker:    Primary Decision Maker: Corinne Saber - [phone]    Click here to complete Healthcare Decision Makers including selection of the Healthcare Decision Maker Relationship (ie \"Primary\").

## 2023-12-27 NOTE — ED PROVIDER NOTES
[facility]  Emergency Department    DISPOSITION Admitted 12/27/2023 08:37:09 AM       ICD-10-CM    1. Kidney stone  N20.0       2. Septicemia (HCC)  A41.9       3. Ureteral stone  N20.1 HYDROcodone-acetaminophen (NORCO) 5-325 MG per tablet        ED Course     ED Course as of 12/30/23 2236   Wed Dec 27, 2023   0503 48-year-old female presents with 2 days of progressive worsening pain in her right flank.  Tachycardic, febrile; normal blood pressure.  Well-appearing on physical exam although does have some moderate right CVA tenderness.  Will obtain sepsis labs and give antibiotics and fluids and obtain CT imaging [ER]   0504 Sinus tachycardia, rate 149.  No STEMI.  Nonspecific ST and T wave changes.  Time: 0458 [ER]      ED Course User Index  [ER] Nicholas Galindo MD     Complexity of Problems Addressed:  1 or more acute illnesses that pose a threat to life or bodily function    Data Reviewed and Analyzed:  Category 1:   I independently ordered and reviewed each unique test.     Category 2:   I independently ordered and interpreted the ED EKG in the absence of a Cardiologist.    I independently interpreted the cardiac monitor rhythm strip sinus tachycardia.  I interpreted the CT Scan right ureteral stone.  I interpreted the lab.    Category 3: Discussion of management or test interpretation.  Please see ED course above  The patient was admitted and I have discussed patient management with the admitting provider.  The management of this patient was discussed with an external consultant.      Risk of Complications and/or Morbidity of Patient Management:  Parental controlled substances given in the ED.  Discussion with external consultants.     Is this patient to be included in the SEP-1 core measure due to severe sepsis or septic shock? No Exclusion criteria - the patient is NOT to be included for SEP-1 Core Measure due to: May have criteria for sepsis, but does not meet criteria for severe  Sister     Cancer Father         melanoma (face)     Hypertension Father     Heart Disease Mother         congenital heart block with celia    Diabetes Father      Allergies   Allergen Reactions    Ciprofloxacin Other (See Comments)     \"joint pain\"    Levofloxacin Other (See Comments)     Multiple joints involved after ~ 3 weeks of Levaquin     Physical Exam     Vitals:    12/29/23 1932 12/30/23 0310 12/30/23 0741 12/30/23 1007   BP: 107/64 118/69 108/70 108/70   Pulse: 91 100 98 98   Resp: 18 18 18    Temp: 97.7 °F (36.5 °C) 98.6 °F (37 °C) 98 °F (36.7 °C)    TempSrc: Oral Oral Oral    SpO2: 99% 98% 98%    Weight:       Height:         Nursing note and vitals reviewed.    Constitutional: Well developed, NAD  HEENT: Atraumatic, conjugate gaze, EOM intact  Neck: Supple  Cardiovascular: No cyanosis, diaphoresis, or JVD appreciated.   Respiratory: Effort normal. No respiratory distress.   Gastrointestinal: Non-distended. No guarding or rebound. Non-tender.  MSK: No deformities appreciated. No peripheral edema.  Moderate right CVA tenderness to palpation  Skin: Skin is warm and dry. No rash appreciated.  Neuro: Alert and oriented, moves all four extremities.  Psych: Pleasant and cooperative.    Procedures   Procedures    MDM     Labs Reviewed   COMPREHENSIVE METABOLIC PANEL - Abnormal; Notable for the following components:       Result Value    Potassium 2.9 (*)     Glucose 113 (*)     All other components within normal limits   CBC WITH AUTO DIFFERENTIAL - Abnormal; Notable for the following components:    Neutrophils % 89 (*)     Lymphocytes % 4 (*)     Eosinophils % 0 (*)     Lymphocytes Absolute 0.4 (*)     All other components within normal limits   URINALYSIS - Abnormal; Notable for the following components:    Protein, UA 30 (*)     Blood, Urine TRACE (*)     Nitrite, Urine Positive (*)     Leukocyte Esterase, Urine SMALL (*)     BACTERIA, URINE 4+ (*)     All other components within normal limits   CBC WITH  components:    Magnesium 1.6 (*)     All other components within normal limits   CULTURE, BLOOD 1   CULTURE, BLOOD 2   LACTATE, SEPSIS   PROCALCITONIN   MAGNESIUM   MAGNESIUM   MAGNESIUM     Medications   0.9 % sodium chloride infusion (0 mL/hr IntraVENous Stopped 12/29/23 0903)   lactated ringers bolus bolus 2,082 mL (2,082 mLs IntraVENous New Bag 12/27/23 0545)   cefTRIAXone (ROCEPHIN) 1,000 mg in sterile water 10 mL IV syringe (1,000 mg IntraVENous Given 12/27/23 9079)   morphine sulfate (PF) injection 4 mg (4 mg IntraVENous Given 12/27/23 0552)   ondansetron (ZOFRAN) injection 4 mg (4 mg IntraVENous Given 12/27/23 0549)   acetaminophen (TYLENOL) tablet 1,000 mg (1,000 mg Oral Given 12/27/23 0548)   potassium chloride 10 mEq/100 mL IVPB (Peripheral Line) ( IntraVENous Stopped 12/27/23 1042)   potassium chloride 10 mEq/100 mL IVPB (Peripheral Line) ( IntraVENous Stopped 12/27/23 1411)     NC XR TECHNOLOGIST SERVICE   Final Result      CT ABDOMEN PELVIS RENAL STONE   Final Result   Obstructing right distal ureteral 5 mm stone with moderate to severe associated   hydroureteronephrosis and perinephric stranding. Bilateral medullary calcinosis. Colonic diverticulosis. XR CHEST PORTABLE   Final Result   No acute findings in the chest           Voice dictation software was used during the making of this note. This software is not perfect and grammatical and other typographical errors may be present. This note has not been completely proofread for errors.      Dain Cedillo MD  12/30/23 1589

## 2023-12-27 NOTE — ANESTHESIA PROCEDURE NOTES
Airway  Date/Time: 12/27/2023 11:05 AM  Urgency: elective    Airway not difficult    General Information and Staff    Patient location during procedure: OR  Performed: resident/CRNA   Performed by: EVITA Canseco - CRNA  Authorized by: Stefani Benavidez MD      Indications and Patient Condition  Indications for airway management: anesthesia  Spontaneous ventilation: present  Sedation level: deep  Preoxygenated: yes  Patient position: sniffing  MILS not maintained throughout  Mask difficulty assessment: vent by bag mask    Final Airway Details  Final airway type: supraglottic airway      Successful airway: oropharyngeal  Size 4     Number of attempts at approach: 1  Ventilation between attempts: supraglottic airway  Number of other approaches attempted: 0    no

## 2023-12-27 NOTE — ACP (ADVANCE CARE PLANNING)
[facility]  At the heart of better care     Advance Care Planning   Admit Date:  2023  4:53 AM   Name:  Anahi Rosa   Age:  46 y.o. Sex:  female  :  1975   MRN:  [id number]   Room:  [facility]/PL    Anahi Rosa has capacity to make her own decisions:   Yes    If pt unable to make decisions, POA/surrogate decision maker:  Spouse    Other people present:   Spouse    Patient / surrogate decision-maker directed code status:  Full Code    Other ACP topics discussed, if applicable:   None    Patient or surrogate consented to discussion of the current conditions, workup, management plans, prognosis, and the risk for further deterioration. Time spent: 17 minutes in direct discussion.       Signed:  Brenden Hdez MD

## 2023-12-27 NOTE — PROGRESS NOTES
TRANSFER - IN REPORT:    Verbal report received from Holly Aguero on Easton Liter  being received from PACU for routine progression of patient care. Report consisted of patient's Situation, Background, Assessment and   Recommendations(SBAR). Information from the following report(s) Nurse Handoff Report was reviewed with the receiving nurse. Opportunity for questions and clarification was provided. Assessment will be completed upon patient's arrival to unit and care assumed.

## 2023-12-28 LAB
ALBUMIN SERPL-MCNC: 3.2 G/DL (ref 3.5–5)
ALBUMIN/GLOB SERPL: 1.1 (ref 0.4–1.6)
ALP SERPL-CCNC: 80 U/L (ref 50–136)
ALT SERPL-CCNC: 18 U/L (ref 12–65)
ANION GAP SERPL CALC-SCNC: 9 MMOL/L (ref 2–11)
AST SERPL-CCNC: 15 U/L (ref 15–37)
BASOPHILS # BLD: 0 K/UL (ref 0–0.2)
BASOPHILS NFR BLD: 0 % (ref 0–2)
BILIRUB SERPL-MCNC: 0.3 MG/DL (ref 0.2–1.1)
BUN SERPL-MCNC: 10 MG/DL (ref 6–23)
CALCIUM SERPL-MCNC: 8.1 MG/DL (ref 8.3–10.4)
CHLORIDE SERPL-SCNC: 109 MMOL/L (ref 103–113)
CO2 SERPL-SCNC: 21 MMOL/L (ref 21–32)
CREAT SERPL-MCNC: 0.9 MG/DL (ref 0.6–1)
DIFFERENTIAL METHOD BLD: ABNORMAL
EOSINOPHIL # BLD: 0 K/UL (ref 0–0.8)
EOSINOPHIL NFR BLD: 0 % (ref 0.5–7.8)
ERYTHROCYTE [DISTWIDTH] IN BLOOD BY AUTOMATED COUNT: 12.9 % (ref 11.9–14.6)
GLOBULIN SER CALC-MCNC: 3 G/DL (ref 2.8–4.5)
GLUCOSE SERPL-MCNC: 99 MG/DL (ref 65–100)
HCT VFR BLD AUTO: 36.9 % (ref 35.8–46.3)
HGB BLD-MCNC: 12.4 G/DL (ref 11.7–15.4)
IMM GRANULOCYTES # BLD AUTO: 0 K/UL (ref 0–0.5)
IMM GRANULOCYTES NFR BLD AUTO: 1 % (ref 0–5)
LYMPHOCYTES # BLD: 0.5 K/UL (ref 0.5–4.6)
LYMPHOCYTES NFR BLD: 7 % (ref 13–44)
MAGNESIUM SERPL-MCNC: 1.3 MG/DL (ref 1.8–2.4)
MAGNESIUM SERPL-MCNC: NORMAL MG/DL
MCH RBC QN AUTO: 28.9 PG (ref 26.1–32.9)
MCHC RBC AUTO-ENTMCNC: 33.6 G/DL (ref 31.4–35)
MCV RBC AUTO: 86 FL (ref 82–102)
MONOCYTES # BLD: 0.7 K/UL (ref 0.1–1.3)
MONOCYTES NFR BLD: 10 % (ref 4–12)
NEUTS SEG # BLD: 5.6 K/UL (ref 1.7–8.2)
NEUTS SEG NFR BLD: 82 % (ref 43–78)
NRBC # BLD: 0 K/UL (ref 0–0.2)
PLATELET # BLD AUTO: 118 K/UL (ref 150–450)
PMV BLD AUTO: 11.9 FL (ref 9.4–12.3)
POTASSIUM SERPL-SCNC: 2.9 MMOL/L (ref 3.5–5.1)
PROT SERPL-MCNC: 6.2 G/DL (ref 6.3–8.2)
RBC # BLD AUTO: 4.29 M/UL (ref 4.05–5.2)
SODIUM SERPL-SCNC: 139 MMOL/L (ref 136–146)
WBC # BLD AUTO: 6.8 K/UL (ref 4.3–11.1)

## 2023-12-28 PROCEDURE — 80053 COMPREHEN METABOLIC PANEL: CPT

## 2023-12-28 PROCEDURE — 2580000003 HC RX 258: Performed by: STUDENT IN AN ORGANIZED HEALTH CARE EDUCATION/TRAINING PROGRAM

## 2023-12-28 PROCEDURE — 6360000002 HC RX W HCPCS: Performed by: STUDENT IN AN ORGANIZED HEALTH CARE EDUCATION/TRAINING PROGRAM

## 2023-12-28 PROCEDURE — 2580000003 HC RX 258: Performed by: UROLOGY

## 2023-12-28 PROCEDURE — 99232 SBSQ HOSP IP/OBS MODERATE 35: CPT | Performed by: UROLOGY

## 2023-12-28 PROCEDURE — 6370000000 HC RX 637 (ALT 250 FOR IP): Performed by: UROLOGY

## 2023-12-28 PROCEDURE — 1100000003 HC PRIVATE W/ TELEMETRY

## 2023-12-28 PROCEDURE — 6360000002 HC RX W HCPCS: Performed by: UROLOGY

## 2023-12-28 PROCEDURE — 83735 ASSAY OF MAGNESIUM: CPT

## 2023-12-28 PROCEDURE — 85025 COMPLETE CBC W/AUTO DIFF WBC: CPT

## 2023-12-28 PROCEDURE — 36415 COLL VENOUS BLD VENIPUNCTURE: CPT

## 2023-12-28 RX ORDER — PROCHLORPERAZINE EDISYLATE 5 MG/ML
10 INJECTION INTRAMUSCULAR; INTRAVENOUS EVERY 6 HOURS PRN
Status: DISCONTINUED | OUTPATIENT
Start: 2023-12-28 | End: 2023-12-30 | Stop reason: HOSPADM

## 2023-12-28 RX ORDER — POTASSIUM CHLORIDE 7.45 MG/ML
10 INJECTION INTRAVENOUS PRN
Status: DISCONTINUED | OUTPATIENT
Start: 2023-12-28 | End: 2023-12-30 | Stop reason: HOSPADM

## 2023-12-28 RX ORDER — POTASSIUM CHLORIDE 20 MEQ/1
40 TABLET, EXTENDED RELEASE ORAL PRN
Status: DISCONTINUED | OUTPATIENT
Start: 2023-12-28 | End: 2023-12-30 | Stop reason: HOSPADM

## 2023-12-28 RX ORDER — MAGNESIUM SULFATE IN WATER 40 MG/ML
2000 INJECTION, SOLUTION INTRAVENOUS PRN
Status: DISCONTINUED | OUTPATIENT
Start: 2023-12-28 | End: 2023-12-30 | Stop reason: HOSPADM

## 2023-12-28 RX ADMIN — WATER 1000 MG: 1 INJECTION INTRAMUSCULAR; INTRAVENOUS; SUBCUTANEOUS at 05:24

## 2023-12-28 RX ADMIN — HYDROCODONE BITARTRATE AND ACETAMINOPHEN 1 TABLET: 5; 325 TABLET ORAL at 12:53

## 2023-12-28 RX ADMIN — POTASSIUM CHLORIDE 10 MEQ: 7.46 INJECTION, SOLUTION INTRAVENOUS at 12:47

## 2023-12-28 RX ADMIN — POTASSIUM CHLORIDE 10 MEQ: 7.46 INJECTION, SOLUTION INTRAVENOUS at 17:48

## 2023-12-28 RX ADMIN — POTASSIUM CHLORIDE 10 MEQ: 7.46 INJECTION, SOLUTION INTRAVENOUS at 11:41

## 2023-12-28 RX ADMIN — HYDROCODONE BITARTRATE AND ACETAMINOPHEN 1 TABLET: 5; 325 TABLET ORAL at 03:34

## 2023-12-28 RX ADMIN — POTASSIUM CHLORIDE 10 MEQ: 7.46 INJECTION, SOLUTION INTRAVENOUS at 13:54

## 2023-12-28 RX ADMIN — SODIUM CHLORIDE: 9 INJECTION, SOLUTION INTRAVENOUS at 17:48

## 2023-12-28 RX ADMIN — ACETAMINOPHEN 650 MG: 325 TABLET ORAL at 23:15

## 2023-12-28 RX ADMIN — POTASSIUM CHLORIDE 10 MEQ: 7.46 INJECTION, SOLUTION INTRAVENOUS at 15:15

## 2023-12-28 RX ADMIN — SODIUM CHLORIDE, PRESERVATIVE FREE 10 ML: 5 INJECTION INTRAVENOUS at 08:27

## 2023-12-28 RX ADMIN — MAGNESIUM SULFATE HEPTAHYDRATE 2000 MG: 40 INJECTION, SOLUTION INTRAVENOUS at 13:08

## 2023-12-28 RX ADMIN — SODIUM CHLORIDE: 9 INJECTION, SOLUTION INTRAVENOUS at 05:29

## 2023-12-28 RX ADMIN — BUPROPION HYDROCHLORIDE 150 MG: 150 TABLET, EXTENDED RELEASE ORAL at 08:26

## 2023-12-28 RX ADMIN — ENOXAPARIN SODIUM 40 MG: 100 INJECTION SUBCUTANEOUS at 08:22

## 2023-12-28 RX ADMIN — SODIUM CHLORIDE, PRESERVATIVE FREE 10 ML: 5 INJECTION INTRAVENOUS at 21:00

## 2023-12-28 RX ADMIN — ATENOLOL 25 MG: 50 TABLET ORAL at 15:09

## 2023-12-28 RX ADMIN — HYDROCODONE BITARTRATE AND ACETAMINOPHEN 1 TABLET: 5; 325 TABLET ORAL at 08:25

## 2023-12-28 RX ADMIN — MAGNESIUM SULFATE HEPTAHYDRATE 2000 MG: 40 INJECTION, SOLUTION INTRAVENOUS at 11:27

## 2023-12-28 RX ADMIN — PROCHLORPERAZINE EDISYLATE 10 MG: 5 INJECTION INTRAMUSCULAR; INTRAVENOUS at 15:38

## 2023-12-28 RX ADMIN — TAMSULOSIN HYDROCHLORIDE 0.4 MG: 0.4 CAPSULE ORAL at 08:25

## 2023-12-28 RX ADMIN — MORPHINE SULFATE 2 MG: 2 INJECTION, SOLUTION INTRAMUSCULAR; INTRAVENOUS at 10:54

## 2023-12-28 RX ADMIN — POTASSIUM CHLORIDE 10 MEQ: 7.46 INJECTION, SOLUTION INTRAVENOUS at 10:34

## 2023-12-28 RX ADMIN — ONDANSETRON 4 MG: 2 INJECTION INTRAMUSCULAR; INTRAVENOUS at 12:53

## 2023-12-28 ASSESSMENT — PAIN DESCRIPTION - LOCATION
LOCATION: HEAD

## 2023-12-28 ASSESSMENT — PAIN DESCRIPTION - DESCRIPTORS
DESCRIPTORS: ACHING

## 2023-12-28 ASSESSMENT — PAIN SCALES - GENERAL
PAINLEVEL_OUTOF10: 6
PAINLEVEL_OUTOF10: 1
PAINLEVEL_OUTOF10: 4
PAINLEVEL_OUTOF10: 3
PAINLEVEL_OUTOF10: 2
PAINLEVEL_OUTOF10: 3
PAINLEVEL_OUTOF10: 4

## 2023-12-28 NOTE — PROGRESS NOTES
Primary RN received call from telemetry monitoring that pt was tachycardic with heart rate of 150. Upon assessment, pt had vomited about 100mL of mucous-tan emesis. Time was about 1530. Phenergan was added and then given directly after episode. Pt responded well to phenergan.

## 2023-12-28 NOTE — PROGRESS NOTES
Feels slightly better. Voiding well. Tm 102.9. Tc 100F  Abd soft. NT, ND  Cultures pending. AM labs pending. S/P R ureteral stent placement/R distal ureteral stone POD#1  Await cultures. Cont Rocephin. Plan for outpt follow up with Dr. Jiles Lanes next wk to discuss stone tx.

## 2023-12-28 NOTE — PROGRESS NOTES
Pt  bed in lowest position, wheels locked, and call light within reach. Hourly rounds completed. Pt had fever most of shift. Ice packs were placed. Meds were given, temp was lowered. Pain treated per MAR. IV is patent and dressing is clean, dry, and intact.

## 2023-12-29 ENCOUNTER — TELEPHONE (OUTPATIENT)
Dept: UROLOGY | Age: 48
End: 2023-12-29

## 2023-12-29 DIAGNOSIS — N20.1 URETERAL STONE: ICD-10-CM

## 2023-12-29 LAB
ALBUMIN SERPL-MCNC: 2.5 G/DL (ref 3.5–5)
ALBUMIN/GLOB SERPL: 0.9 (ref 0.4–1.6)
ALP SERPL-CCNC: 66 U/L (ref 50–136)
ALT SERPL-CCNC: 16 U/L (ref 12–65)
ANION GAP SERPL CALC-SCNC: 4 MMOL/L (ref 2–11)
APPEARANCE UR: CLEAR
AST SERPL-CCNC: 12 U/L (ref 15–37)
BACTERIA URNS QL MICRO: 0 /HPF
BASOPHILS # BLD: 0 K/UL (ref 0–0.2)
BASOPHILS NFR BLD: 0 % (ref 0–2)
BILIRUB SERPL-MCNC: 0.2 MG/DL (ref 0.2–1.1)
BILIRUB UR QL: NEGATIVE
BUN SERPL-MCNC: 6 MG/DL (ref 6–23)
CALCIUM SERPL-MCNC: 7.8 MG/DL (ref 8.3–10.4)
CASTS URNS QL MICRO: 0 /LPF
CHLORIDE SERPL-SCNC: 113 MMOL/L (ref 103–113)
CO2 SERPL-SCNC: 23 MMOL/L (ref 21–32)
COLOR UR: ABNORMAL
CREAT SERPL-MCNC: 0.7 MG/DL (ref 0.6–1)
CRYSTALS URNS QL MICRO: 0 /LPF
DIFFERENTIAL METHOD BLD: ABNORMAL
EOSINOPHIL # BLD: 0 K/UL (ref 0–0.8)
EOSINOPHIL NFR BLD: 0 % (ref 0.5–7.8)
EPI CELLS #/AREA URNS HPF: ABNORMAL /HPF
ERYTHROCYTE [DISTWIDTH] IN BLOOD BY AUTOMATED COUNT: 12.8 % (ref 11.9–14.6)
GLOBULIN SER CALC-MCNC: 2.9 G/DL (ref 2.8–4.5)
GLUCOSE SERPL-MCNC: 104 MG/DL (ref 65–100)
GLUCOSE UR STRIP.AUTO-MCNC: NEGATIVE MG/DL
HCT VFR BLD AUTO: 30.2 % (ref 35.8–46.3)
HGB BLD-MCNC: 10 G/DL (ref 11.7–15.4)
HGB UR QL STRIP: ABNORMAL
IMM GRANULOCYTES # BLD AUTO: 0 K/UL (ref 0–0.5)
IMM GRANULOCYTES NFR BLD AUTO: 0 % (ref 0–5)
KETONES UR QL STRIP.AUTO: NEGATIVE MG/DL
LEUKOCYTE ESTERASE UR QL STRIP.AUTO: ABNORMAL
LYMPHOCYTES # BLD: 0.4 K/UL (ref 0.5–4.6)
LYMPHOCYTES NFR BLD: 11 % (ref 13–44)
MAGNESIUM SERPL-MCNC: 2.2 MG/DL (ref 1.8–2.4)
MCH RBC QN AUTO: 28.7 PG (ref 26.1–32.9)
MCHC RBC AUTO-ENTMCNC: 33.1 G/DL (ref 31.4–35)
MCV RBC AUTO: 86.8 FL (ref 82–102)
MONOCYTES # BLD: 0.6 K/UL (ref 0.1–1.3)
MONOCYTES NFR BLD: 18 % (ref 4–12)
MUCOUS THREADS URNS QL MICRO: 0 /LPF
NEUTS SEG # BLD: 2.5 K/UL (ref 1.7–8.2)
NEUTS SEG NFR BLD: 71 % (ref 43–78)
NITRITE UR QL STRIP.AUTO: NEGATIVE
NRBC # BLD: 0 K/UL (ref 0–0.2)
OTHER OBSERVATIONS: ABNORMAL
PH UR STRIP: 7.5 (ref 5–9)
PLATELET # BLD AUTO: 126 K/UL (ref 150–450)
PMV BLD AUTO: 10.6 FL (ref 9.4–12.3)
POTASSIUM SERPL-SCNC: 3.3 MMOL/L (ref 3.5–5.1)
PROT SERPL-MCNC: 5.4 G/DL (ref 6.3–8.2)
PROT UR STRIP-MCNC: NEGATIVE MG/DL
RBC # BLD AUTO: 3.48 M/UL (ref 4.05–5.2)
RBC #/AREA URNS HPF: ABNORMAL /HPF
SODIUM SERPL-SCNC: 140 MMOL/L (ref 136–146)
SP GR UR REFRACTOMETRY: 1.01 (ref 1–1.02)
URINE CULTURE IF INDICATED: ABNORMAL
UROBILINOGEN UR QL STRIP.AUTO: 0.2 EU/DL (ref 0.2–1)
WBC # BLD AUTO: 3.5 K/UL (ref 4.3–11.1)
WBC URNS QL MICRO: ABNORMAL /HPF

## 2023-12-29 PROCEDURE — 6360000002 HC RX W HCPCS: Performed by: UROLOGY

## 2023-12-29 PROCEDURE — 1100000000 HC RM PRIVATE

## 2023-12-29 PROCEDURE — 6370000000 HC RX 637 (ALT 250 FOR IP): Performed by: STUDENT IN AN ORGANIZED HEALTH CARE EDUCATION/TRAINING PROGRAM

## 2023-12-29 PROCEDURE — 80053 COMPREHEN METABOLIC PANEL: CPT

## 2023-12-29 PROCEDURE — 99232 SBSQ HOSP IP/OBS MODERATE 35: CPT | Performed by: UROLOGY

## 2023-12-29 PROCEDURE — 81001 URINALYSIS AUTO W/SCOPE: CPT

## 2023-12-29 PROCEDURE — 2580000003 HC RX 258: Performed by: STUDENT IN AN ORGANIZED HEALTH CARE EDUCATION/TRAINING PROGRAM

## 2023-12-29 PROCEDURE — 1100000003 HC PRIVATE W/ TELEMETRY

## 2023-12-29 PROCEDURE — 6370000000 HC RX 637 (ALT 250 FOR IP): Performed by: UROLOGY

## 2023-12-29 PROCEDURE — 36415 COLL VENOUS BLD VENIPUNCTURE: CPT

## 2023-12-29 PROCEDURE — 85025 COMPLETE CBC W/AUTO DIFF WBC: CPT

## 2023-12-29 PROCEDURE — 2580000003 HC RX 258: Performed by: UROLOGY

## 2023-12-29 PROCEDURE — 83735 ASSAY OF MAGNESIUM: CPT

## 2023-12-29 RX ORDER — SODIUM CHLORIDE 9 MG/ML
INJECTION, SOLUTION INTRAVENOUS CONTINUOUS
Status: DISCONTINUED | OUTPATIENT
Start: 2023-12-29 | End: 2023-12-30 | Stop reason: HOSPADM

## 2023-12-29 RX ADMIN — SODIUM CHLORIDE: 9 INJECTION, SOLUTION INTRAVENOUS at 18:34

## 2023-12-29 RX ADMIN — SODIUM CHLORIDE, PRESERVATIVE FREE 10 ML: 5 INJECTION INTRAVENOUS at 09:08

## 2023-12-29 RX ADMIN — SODIUM CHLORIDE, PRESERVATIVE FREE 10 ML: 5 INJECTION INTRAVENOUS at 20:03

## 2023-12-29 RX ADMIN — WATER 1000 MG: 1 INJECTION INTRAMUSCULAR; INTRAVENOUS; SUBCUTANEOUS at 05:18

## 2023-12-29 RX ADMIN — SODIUM CHLORIDE: 9 INJECTION, SOLUTION INTRAVENOUS at 02:54

## 2023-12-29 RX ADMIN — POTASSIUM CHLORIDE 40 MEQ: 1500 TABLET, EXTENDED RELEASE ORAL at 06:20

## 2023-12-29 RX ADMIN — BUPROPION HYDROCHLORIDE 150 MG: 150 TABLET, EXTENDED RELEASE ORAL at 09:05

## 2023-12-29 RX ADMIN — TAMSULOSIN HYDROCHLORIDE 0.4 MG: 0.4 CAPSULE ORAL at 09:05

## 2023-12-29 RX ADMIN — ONDANSETRON 4 MG: 2 INJECTION INTRAMUSCULAR; INTRAVENOUS at 12:57

## 2023-12-29 RX ADMIN — ACETAMINOPHEN 650 MG: 325 TABLET ORAL at 12:57

## 2023-12-29 RX ADMIN — ENOXAPARIN SODIUM 40 MG: 100 INJECTION SUBCUTANEOUS at 09:05

## 2023-12-29 ASSESSMENT — PAIN DESCRIPTION - LOCATION: LOCATION: HEAD

## 2023-12-29 ASSESSMENT — PAIN SCALES - GENERAL: PAINLEVEL_OUTOF10: 2

## 2023-12-29 ASSESSMENT — PAIN DESCRIPTION - DESCRIPTORS: DESCRIPTORS: ACHING

## 2023-12-29 NOTE — TELEPHONE ENCOUNTER
----- Message from NERI Uribe sent at 12/29/2023  8:18 AM EST -----  Can you schedule this patient with Dr. Marcum in 2 weeks for right blue plate.

## 2023-12-29 NOTE — PROGRESS NOTES
Hospitalist Progress Note   Admit Date:  2023  4:53 AM   Name:  Tonya Johnson   Age:  46 y.o. Sex:  female  :  1975   MRN:  [id number]   Room:  [facility]/    Presenting/Chief Complaint: Flank Pain     Reason(s) for Admission: Kidney stone [N20.0]  Nephrolithiasis [N20.0]  Septicemia [facility]) [A41.9]     Hospital Course:   46 y.o. female with medical history of tachycardia,who presented with right flank pain. The pain has been ongoing for the last 2 days but significantly worsened within the last 24 hours. Symptoms were associated with nausea but no vomiting. The pain is sharp, right flank located and non radiating. In the ED, HR was in the 160's, with Tmax of 102.5 noted. Labs showed K of 2.9, with UA positive for nitrite and leukocyte esterase. CT abdomen showed Obstructing right distal ureteral 5 mm stone with moderate to severe associated hydroureteronephrosis and perinephric stranding. Patient admitted for further management        Subjective & 24hr Events:   Patient was seen and examined at the bedside. Fever overnight. Patient feeling some nausea this morning. Feeling better overall. No new complaints. Assessment & Plan:   Kidney stone  UTI  -UA positive for nitrite and leukocyte esterase. -CT abdomen showed Obstructing right distal ureteral 5 mm stone with moderate to severe associated hydroureteronephrosis and perinephric stranding. -NPO   -Flomax  -morphine, Toradol for pain  -started on rocephin  -Urology consulted  - patient with fever this morning 102.9. Culture still pending  - Status post right ureteral stent placement for right distal ureteral stone  - Per urology patient will need outpatient follow-up with Dr. Lias Knowles next week  -Patient will continue IV antibiotics per urology recommendations prior to discharge  - Urine culture was never done from initial UA.   Repeat UA with reflex culture  - Continue to monitor patient has any further fevers

## 2023-12-29 NOTE — PROGRESS NOTES
Pt  bed in lowest position, wheels locked, and call light within reach. Hourly rounds completed. VSS. IV is patent and dressing is clean, dry, and intact. Pt had elevated temp during shift. Treated with tylenol and ice packs. No nausea this shift.

## 2023-12-29 NOTE — TELEPHONE ENCOUNTER
CYSTOSCOPY, RIGHT  URETEROSCOPY, LASER LITHOTRIPSY, RIGHT URETERAL STENT EXCHANGE   Date: 1/10/2024   Time: 1136   Location: [facility] MAIN OR 01 CYSTO

## 2023-12-29 NOTE — PROGRESS NOTES
Hourly rounding completed during shift. All needs met at this time. Had a headache x1, medicated per [facility] and effective. Bed L/L with call bell in reach, visitor at side. Report given to oncoming RN.

## 2023-12-30 VITALS
BODY MASS INDEX: 27.11 KG/M2 | DIASTOLIC BLOOD PRESSURE: 70 MMHG | RESPIRATION RATE: 18 BRPM | TEMPERATURE: 98 F | HEART RATE: 98 BPM | SYSTOLIC BLOOD PRESSURE: 108 MMHG | OXYGEN SATURATION: 98 % | WEIGHT: 153 LBS | HEIGHT: 63 IN

## 2023-12-30 LAB
ALBUMIN SERPL-MCNC: 2.9 G/DL (ref 3.5–5)
ALBUMIN/GLOB SERPL: 0.9 (ref 0.4–1.6)
ALP SERPL-CCNC: 78 U/L (ref 50–136)
ALT SERPL-CCNC: 18 U/L (ref 12–65)
ANION GAP SERPL CALC-SCNC: 2 MMOL/L (ref 2–11)
AST SERPL-CCNC: 17 U/L (ref 15–37)
BASOPHILS # BLD: 0 K/UL (ref 0–0.2)
BASOPHILS NFR BLD: 0 % (ref 0–2)
BILIRUB SERPL-MCNC: 0.2 MG/DL (ref 0.2–1.1)
BUN SERPL-MCNC: 6 MG/DL (ref 6–23)
CALCIUM SERPL-MCNC: 8.3 MG/DL (ref 8.3–10.4)
CHLORIDE SERPL-SCNC: 113 MMOL/L (ref 103–113)
CO2 SERPL-SCNC: 24 MMOL/L (ref 21–32)
CREAT SERPL-MCNC: 0.7 MG/DL (ref 0.6–1)
DIFFERENTIAL METHOD BLD: ABNORMAL
EOSINOPHIL # BLD: 0 K/UL (ref 0–0.8)
EOSINOPHIL NFR BLD: 1 % (ref 0.5–7.8)
ERYTHROCYTE [DISTWIDTH] IN BLOOD BY AUTOMATED COUNT: 12.8 % (ref 11.9–14.6)
GLOBULIN SER CALC-MCNC: 3.3 G/DL (ref 2.8–4.5)
GLUCOSE SERPL-MCNC: 100 MG/DL (ref 65–100)
HCT VFR BLD AUTO: 32.3 % (ref 35.8–46.3)
HGB BLD-MCNC: 10.8 G/DL (ref 11.7–15.4)
IMM GRANULOCYTES # BLD AUTO: 0 K/UL (ref 0–0.5)
IMM GRANULOCYTES NFR BLD AUTO: 0 % (ref 0–5)
LYMPHOCYTES # BLD: 0.4 K/UL (ref 0.5–4.6)
LYMPHOCYTES NFR BLD: 14 % (ref 13–44)
MAGNESIUM SERPL-MCNC: 1.6 MG/DL (ref 1.8–2.4)
MCH RBC QN AUTO: 28.8 PG (ref 26.1–32.9)
MCHC RBC AUTO-ENTMCNC: 33.4 G/DL (ref 31.4–35)
MCV RBC AUTO: 86.1 FL (ref 82–102)
MONOCYTES # BLD: 0.4 K/UL (ref 0.1–1.3)
MONOCYTES NFR BLD: 15 % (ref 4–12)
NEUTS SEG # BLD: 1.9 K/UL (ref 1.7–8.2)
NEUTS SEG NFR BLD: 70 % (ref 43–78)
NRBC # BLD: 0 K/UL (ref 0–0.2)
PLATELET # BLD AUTO: 145 K/UL (ref 150–450)
PMV BLD AUTO: 10.3 FL (ref 9.4–12.3)
POTASSIUM SERPL-SCNC: 3.3 MMOL/L (ref 3.5–5.1)
PROT SERPL-MCNC: 6.2 G/DL (ref 6.3–8.2)
RBC # BLD AUTO: 3.75 M/UL (ref 4.05–5.2)
SODIUM SERPL-SCNC: 139 MMOL/L (ref 136–146)
WBC # BLD AUTO: 2.8 K/UL (ref 4.3–11.1)

## 2023-12-30 PROCEDURE — 83735 ASSAY OF MAGNESIUM: CPT

## 2023-12-30 PROCEDURE — 85025 COMPLETE CBC W/AUTO DIFF WBC: CPT

## 2023-12-30 PROCEDURE — 36415 COLL VENOUS BLD VENIPUNCTURE: CPT

## 2023-12-30 PROCEDURE — 6360000002 HC RX W HCPCS: Performed by: STUDENT IN AN ORGANIZED HEALTH CARE EDUCATION/TRAINING PROGRAM

## 2023-12-30 PROCEDURE — 6360000002 HC RX W HCPCS: Performed by: UROLOGY

## 2023-12-30 PROCEDURE — 80053 COMPREHEN METABOLIC PANEL: CPT

## 2023-12-30 PROCEDURE — 6370000000 HC RX 637 (ALT 250 FOR IP): Performed by: UROLOGY

## 2023-12-30 PROCEDURE — 2580000003 HC RX 258: Performed by: UROLOGY

## 2023-12-30 RX ORDER — HYDROCODONE BITARTRATE AND ACETAMINOPHEN 5; 325 MG/1; MG/1
1 TABLET ORAL EVERY 6 HOURS PRN
Qty: 12 TABLET | Refills: 0 | Status: SHIPPED | OUTPATIENT
Start: 2023-12-30 | End: 2024-01-02

## 2023-12-30 RX ORDER — ONDANSETRON 4 MG/1
4 TABLET, ORALLY DISINTEGRATING ORAL EVERY 8 HOURS PRN
Qty: 21 TABLET | Refills: 0 | Status: SHIPPED | OUTPATIENT
Start: 2023-12-30 | End: 2024-01-06

## 2023-12-30 RX ORDER — CEPHALEXIN 500 MG/1
500 CAPSULE ORAL 3 TIMES DAILY
Qty: 6 CAPSULE | Refills: 0 | Status: SHIPPED | OUTPATIENT
Start: 2023-12-30 | End: 2024-01-01

## 2023-12-30 RX ORDER — TAMSULOSIN HYDROCHLORIDE 0.4 MG/1
0.4 CAPSULE ORAL DAILY
Qty: 14 CAPSULE | Refills: 0 | Status: SHIPPED | OUTPATIENT
Start: 2023-12-31 | End: 2024-01-14

## 2023-12-30 RX ADMIN — BUPROPION HYDROCHLORIDE 150 MG: 150 TABLET, EXTENDED RELEASE ORAL at 10:07

## 2023-12-30 RX ADMIN — WATER 1000 MG: 1 INJECTION INTRAMUSCULAR; INTRAVENOUS; SUBCUTANEOUS at 05:49

## 2023-12-30 RX ADMIN — TAMSULOSIN HYDROCHLORIDE 0.4 MG: 0.4 CAPSULE ORAL at 10:07

## 2023-12-30 RX ADMIN — SODIUM CHLORIDE, PRESERVATIVE FREE 10 ML: 5 INJECTION INTRAVENOUS at 10:06

## 2023-12-30 RX ADMIN — MAGNESIUM SULFATE HEPTAHYDRATE 2000 MG: 40 INJECTION, SOLUTION INTRAVENOUS at 10:06

## 2023-12-30 RX ADMIN — ATENOLOL 25 MG: 50 TABLET ORAL at 10:07

## 2023-12-30 RX ADMIN — ENOXAPARIN SODIUM 40 MG: 100 INJECTION SUBCUTANEOUS at 10:07

## 2023-12-30 NOTE — PROGRESS NOTES
Pt's D/C instructions completed. IV's removed and intact. Verbalized understanding of all instructions including diet, activity, s/sx to alert MD, medications, and f/u appointment.

## 2023-12-30 NOTE — PROGRESS NOTES
Resting quietly in bed. A/O x4. No new complaints overnight. Hourly rounds completed, all needs met this shift. Bed in L/L with call light in reach. Report to be given to dayshift nurse.

## 2023-12-30 NOTE — CARE COORDINATION
Pt is for discharge home today with family and no needs/supportive care orders recieved for CM at this time. 12/27/23 0903   Service Assessment   Patient Orientation Alert and Oriented   Cognition Alert   History Provided By Patient   Primary Caregiver Self   Accompanied By/Relationship spouse   Support Systems Spouse/Significant Other;Children   Patient's Healthcare Decision Maker is: Legal Next of Kin   PCP Verified by CM Yes   Last Visit to PCP Within last year   Prior Functional Level Independent in ADLs/IADLs   Current Functional Level Independent in ADLs/IADLs   Can patient return to prior living arrangement Yes   Ability to make needs known: Good   Family able to assist with home care needs: Yes   Would you like for me to discuss the discharge plan with any other family members/significant others, and if so, who? No   Financial Resources [employer] None   CM/SW Referral Other (see comment)  (none)   Social/Functional History   Lives With Spouse;Daughter   Type of Home Mobile home   Home Layout One level   Home Access Stairs to enter with rails   Entrance Stairs - Number of Steps 3   Bathroom Shower/Tub Tub/Shower unit   Bathroom Toilet Standard   Bathroom Equipment None   Bathroom Accessibility Accessible   Home Equipment None   Receives Help From Other (comment)  (patient is independent)   ADL Assistance Independent   Homemaking Assistance Independent   Homemaking Responsibilities Yes   Ambulation Assistance Independent   Transfer Assistance Independent   Active  Yes   Occupation Full time employment   Type of Occupation cleans houses   Discharge Planning   Type of Residence Other (Comment)  (double wide mobile home)   Living Arrangements Spouse/Significant Other;Children   Current Services Prior To Admission None   Potential Assistance Needed N/A   DME Ordered?  No   Potential Assistance Purchasing Medications No   Type of Home Care Services None   One/Two Story Residence One

## 2023-12-30 NOTE — DISCHARGE SUMMARY
Hospitalist Discharge Summary   Admit Date:  2023  4:53 AM   DC Note date: 2023  Name:  Isak Wilks   Age:  46 y.o. Sex:  female  :  1975   MRN:  [id number]   Room:  [facility]  PCP:  EVITA Inman NP    Presenting Complaint: Flank Pain     Initial Admission Diagnosis: Kidney stone [N20.0]  Nephrolithiasis [N20.0]  Septicemia ([address]) [A41.9]     Problem List for this Hospitalization (present on admission):    Principal Problem:    Kidney stone  Resolved Problems:    * No resolved hospital problems. *      Hospital Course:  46 y.o. female with medical history of tachycardia,who presented with right flank pain. The pain has been ongoing for the last 2 days but significantly worsened within the last 24 hours. Symptoms were associated with nausea but no vomiting. The pain is sharp, right flank located and non radiating. In the ED, HR was in the 160's, with Tmax of 102.5 noted. Labs showed K of 2.9, with UA positive for nitrite and leukocyte esterase. CT abdomen showed Obstructing right distal ureteral 5 mm stone with moderate to severe associated hydroureteronephrosis and perinephric stranding. Patient presented with kidney stone positive UA with nitrate and leuk esterase. CT abdomen showed obstructing right distal ureteral 5 mm stone with moderate to severe associated hydronephrosis and perinephric stranding. Patient was seen by urology status post right ureteral stent placement for right distal ureteral stone. Patient will need outpatient follow-up with urology after discharge. Patient was continued on IV antibiotics transition to p.o. to complete pleat course. Urine culture never sent from initial UA. For patient's other comorbid condition she was restarted back on her home medications. Patient was advised he needs to follow-up with her primary care provider within next 2 to 5 days to discuss recent hospitalization and any changes made to her medications.   All questions   MONOSABS 0.7 0.6 0.4   BASOSABS 0.0 0.0 0.0   ABSIMMGRAN 0.0 0.0 0.0      LFT Recent Labs     12/28/23  0749 12/29/23  0523 12/30/23  0600   BILITOT 0.3 0.2 0.2   ALKPHOS 80 66 78   AST 15 12* 17   ALT 18 16 18   PROT 6.2* 5.4* 6.2*   LABALBU 3.2* 2.5* 2.9*   GLOB 3.0 2.9 3.3      Cardiac  No results found for: \"NTPROBNP\", \"TROPHS\"   Coags No results found for: \"PROTIME\", \"INR\", \"APTT\"   A1c No results found for: \"LABA1C\", \"EAG\"   Lipids No results found for: \"CHOL\", \"LDLCALC\", \"LABVLDL\", \"HDL\", \"CHOLHDLRATIO\", \"TRIG\"   Thyroid  No results found for: \"TSHELE\", \"LMO4GUJ\"     Most Recent UA Lab Results   Component Value Date/Time    COLORU YELLOW/STRAW 12/29/2023 12:37 PM    APPEARANCE CLEAR 12/29/2023 12:37 PM    SPECGRAV 1.007 12/29/2023 12:37 PM    LABPH 7.5 12/29/2023 12:37 PM    PROTEINU Negative 12/29/2023 12:37 PM    GLUCOSEU Negative 12/29/2023 12:37 PM    KETUA Negative 12/29/2023 12:37 PM    BILIRUBINUR Negative 12/29/2023 12:37 PM    BLOODU MODERATE 12/29/2023 12:37 PM    UROBILINOGEN 0.2 12/29/2023 12:37 PM    NITRU Negative 12/29/2023 12:37 PM    LEUKOCYTESUR SMALL 12/29/2023 12:37 PM    WBCUA 0-3 12/29/2023 12:37 PM    RBCUA 5-10 12/29/2023 12:37 PM    EPITHUA 3-5 12/29/2023 12:37 PM    BACTERIA 0 12/29/2023 12:37 PM    LABCAST 0 12/29/2023 12:37 PM    MUCUS 0 12/29/2023 12:37 PM        Microbiology:  Results       Procedure Component Value Units Date/Time    Blood Culture 2 [[id number]] Collected: 12/27/23 0541    Order Status: Completed Specimen: Blood Updated: 12/30/23 0735     Special Requests --        LEFT  Antecubital       Culture NO GROWTH 3 DAYS       Blood Culture 1 [[id number]] Collected: 12/27/23 0520    Order Status: Completed Specimen: Blood Updated: 12/30/23 0735     Special Requests --        RIGHT  Antecubital       Culture NO GROWTH 3 DAYS               All Labs from Last 24 Hrs:  Recent Results (from the past 24 hour(s))   Comprehensive Metabolic Panel w/ Reflex to MG    Collection

## 2023-12-30 NOTE — PLAN OF CARE
Problem: Pain  Goal: Verbalizes/displays adequate comfort level or baseline comfort level  Outcome: Adequate for Discharge     Problem: Discharge Planning  Goal: Discharge to home or other facility with appropriate resources  Outcome: Adequate for Discharge     Problem: ABCDS Injury Assessment  Goal: Absence of physical injury  Outcome: Adequate for Discharge     Problem: Safety - Adult  Goal: Free from fall injury  Outcome: Adequate for Discharge

## 2024-01-03 ENCOUNTER — TELEPHONE (OUTPATIENT)
Dept: UROLOGY | Age: 49
End: 2024-01-03

## 2024-01-03 NOTE — TELEPHONE ENCOUNTER
Pt calling c/o gross hematuria and flank pain. Pt does have stent in placed. Sx are normal. Told to increased fluid intake. Also having issues with mychart, gave her the number to mychart.

## 2024-01-05 NOTE — PERIOP NOTE
Patient verified name and .  Order for consent NOT found in EHR and matches case posting; patient verifies procedure.   Type 1B surgery, PHONE assessment complete.  Orders NOT received.  Labs per surgeon: NONE  Labs per anesthesia protocol: Potassium (DOS)    Patient answered medical/surgical history questions at their best of ability. All prior to admission medications documented in EPIC.  Patient instructed to take the following medications the day of surgery according to anesthesia guidelines with a small sip of water:     Bupropion (Wellbutrin)  Atenolol (Tenormin)  Tamsulosin (Flomax)       On the day before surgery please take 2 Tylenol in the morning and then again before bed. You may use either regular or extra strength.   Hold all vitamins 7 days prior to surgery and NSAIDS 5 days prior to surgery. Prescription meds to hold: Magnesium, please start to hold this medication as of now 23.   Patient instructed on the following:    > Arrive at Main Entrance, time of arrival to be called the day before by 1700  > NPO after midnight, unless otherwise indicated, including gum, mints, and ice chips  > Responsible adult must drive patient to the hospital, stay during surgery, and patient will need supervision 24 hours after anesthesia  > Use non moisturizing soap in shower the night before surgery and on the morning of surgery  > All piercings must be removed prior to arrival.    > Leave all valuables (money and jewelry) at home but bring insurance card and ID on DOS.   > You may be required to pay a deductible or co-pay on the day of your procedure. You can pre-pay by calling [phone] if your surgery is at the [facility] or [phone] if your surgery is at the [facility].  > Do not wear make-up, nail polish, lotions, cologne, perfumes, powders, or oil on skin. Artificial nails are not permitted.

## 2024-01-07 NOTE — PROGRESS NOTES
Physician Progress Note      PATIENT:               HAIM FERRIS  CSN #:                  [id number]  :                       1975  ADMIT DATE:       2023 4:53 AM  DISCH DATE:        2023 3:39 PM  RESPONDING  PROVIDER #:        Keaton Moreno MD          QUERY TEXT:    Pt admitted with UTI. Pt noted to have Temp 102.5, , 124. If possible,   please document in the progress notes and discharge summary if you are   evaluating and /or treating any of the following:    The medical record reflects the following:  Risk Factors: the 48 yrs. old female, UTI.  Clinical Indicators: H&P -UTI  temp - 102.5, 101.7  pulse - 161, 124,  Treatment: IV Rocephin.  Options provided:  -- Sepsis Due to UTI, present on admission  -- UTI without Sepsis  -- Other - I will add my own diagnosis  -- Disagree - Not applicable / Not valid  -- Disagree - Clinically unable to determine / Unknown  -- Refer to Clinical Documentation Reviewer    PROVIDER RESPONSE TEXT:    This patient has sepsis due to UTI, which was present on admission.    Query created by: Lisa Koenig on 2023 9:53 AM      Electronically signed by:  Keaton Moreno MD 2024 10:54 AM

## 2024-01-09 ENCOUNTER — ANESTHESIA EVENT (OUTPATIENT)
Dept: SURGERY | Age: 49
End: 2024-01-09
Payer: MEDICAID

## 2024-01-10 ENCOUNTER — HOSPITAL ENCOUNTER (OUTPATIENT)
Age: 49
Setting detail: OUTPATIENT SURGERY
Discharge: HOME | End: 2024-01-10
Attending: UROLOGY | Admitting: UROLOGY
Payer: MEDICAID

## 2024-01-10 ENCOUNTER — ANESTHESIA (OUTPATIENT)
Dept: SURGERY | Age: 49
End: 2024-01-10
Payer: MEDICAID

## 2024-01-10 VITALS
OXYGEN SATURATION: 96 % | SYSTOLIC BLOOD PRESSURE: 106 MMHG | RESPIRATION RATE: 18 BRPM | DIASTOLIC BLOOD PRESSURE: 63 MMHG | HEIGHT: 63 IN | WEIGHT: 148 LBS | BODY MASS INDEX: 26.22 KG/M2 | HEART RATE: 70 BPM | TEMPERATURE: 97.6 F

## 2024-01-10 DIAGNOSIS — N20.1 URETERAL STONE: Primary | ICD-10-CM

## 2024-01-10 PROCEDURE — C2617 STENT, NON-COR, TEM W/O DEL: HCPCS | Performed by: UROLOGY

## 2024-01-10 PROCEDURE — 3600000014 HC SURGERY LEVEL 4 ADDTL 15MIN: Performed by: UROLOGY

## 2024-01-10 PROCEDURE — 7100000000 HC PACU RECOVERY - FIRST 15 MIN: Performed by: UROLOGY

## 2024-01-10 PROCEDURE — 7100000001 HC PACU RECOVERY - ADDTL 15 MIN: Performed by: UROLOGY

## 2024-01-10 PROCEDURE — 7100000011 HC PHASE II RECOVERY - ADDTL 15 MIN: Performed by: UROLOGY

## 2024-01-10 PROCEDURE — 2500000003 HC RX 250 WO HCPCS: Performed by: NURSE ANESTHETIST, CERTIFIED REGISTERED

## 2024-01-10 PROCEDURE — 2709999900 HC NON-CHARGEABLE SUPPLY: Performed by: UROLOGY

## 2024-01-10 PROCEDURE — 2580000003 HC RX 258: Performed by: NURSE ANESTHETIST, CERTIFIED REGISTERED

## 2024-01-10 PROCEDURE — 3700000001 HC ADD 15 MINUTES (ANESTHESIA): Performed by: UROLOGY

## 2024-01-10 PROCEDURE — 6360000002 HC RX W HCPCS: Performed by: NURSE ANESTHETIST, CERTIFIED REGISTERED

## 2024-01-10 PROCEDURE — 6360000002 HC RX W HCPCS: Performed by: ANESTHESIOLOGY

## 2024-01-10 PROCEDURE — 6370000000 HC RX 637 (ALT 250 FOR IP): Performed by: ANESTHESIOLOGY

## 2024-01-10 PROCEDURE — 6360000002 HC RX W HCPCS: Performed by: UROLOGY

## 2024-01-10 PROCEDURE — 52356 CYSTO/URETERO W/LITHOTRIPSY: CPT | Performed by: UROLOGY

## 2024-01-10 PROCEDURE — 3700000000 HC ANESTHESIA ATTENDED CARE: Performed by: UROLOGY

## 2024-01-10 PROCEDURE — 2580000003 HC RX 258: Performed by: ANESTHESIOLOGY

## 2024-01-10 PROCEDURE — 3600000004 HC SURGERY LEVEL 4 BASE: Performed by: UROLOGY

## 2024-01-10 PROCEDURE — 7100000010 HC PHASE II RECOVERY - FIRST 15 MIN: Performed by: UROLOGY

## 2024-01-10 PROCEDURE — 2720000010 HC SURG SUPPLY STERILE: Performed by: UROLOGY

## 2024-01-10 DEVICE — URETERAL STENT
Type: IMPLANTABLE DEVICE | Site: URETER | Status: FUNCTIONAL
Brand: PERCUFLEX™ PLUS

## 2024-01-10 RX ORDER — MIDAZOLAM HYDROCHLORIDE 1 MG/ML
INJECTION INTRAMUSCULAR; INTRAVENOUS PRN
Status: DISCONTINUED | OUTPATIENT
Start: 2024-01-10 | End: 2024-01-10 | Stop reason: SDUPTHER

## 2024-01-10 RX ORDER — DEXTROSE MONOHYDRATE 100 MG/ML
INJECTION, SOLUTION INTRAVENOUS CONTINUOUS PRN
Status: DISCONTINUED | OUTPATIENT
Start: 2024-01-10 | End: 2024-01-10 | Stop reason: HOSPADM

## 2024-01-10 RX ORDER — SODIUM CHLORIDE 0.9 % (FLUSH) 0.9 %
5-40 SYRINGE (ML) INJECTION PRN
Status: DISCONTINUED | OUTPATIENT
Start: 2024-01-10 | End: 2024-01-10 | Stop reason: HOSPADM

## 2024-01-10 RX ORDER — APREPITANT 40 MG/1
40 CAPSULE ORAL ONCE
Status: COMPLETED | OUTPATIENT
Start: 2024-01-10 | End: 2024-01-10

## 2024-01-10 RX ORDER — MIDAZOLAM HYDROCHLORIDE 2 MG/2ML
2 INJECTION, SOLUTION INTRAMUSCULAR; INTRAVENOUS
Status: DISCONTINUED | OUTPATIENT
Start: 2024-01-10 | End: 2024-01-10 | Stop reason: HOSPADM

## 2024-01-10 RX ORDER — FENTANYL CITRATE 50 UG/ML
INJECTION, SOLUTION INTRAMUSCULAR; INTRAVENOUS PRN
Status: DISCONTINUED | OUTPATIENT
Start: 2024-01-10 | End: 2024-01-10 | Stop reason: SDUPTHER

## 2024-01-10 RX ORDER — PROCHLORPERAZINE EDISYLATE 5 MG/ML
5 INJECTION INTRAMUSCULAR; INTRAVENOUS
Status: DISCONTINUED | OUTPATIENT
Start: 2024-01-10 | End: 2024-01-10 | Stop reason: HOSPADM

## 2024-01-10 RX ORDER — ACETAMINOPHEN 500 MG
1000 TABLET ORAL ONCE
Status: COMPLETED | OUTPATIENT
Start: 2024-01-10 | End: 2024-01-10

## 2024-01-10 RX ORDER — SODIUM CHLORIDE, SODIUM LACTATE, POTASSIUM CHLORIDE, CALCIUM CHLORIDE 600; 310; 30; 20 MG/100ML; MG/100ML; MG/100ML; MG/100ML
INJECTION, SOLUTION INTRAVENOUS CONTINUOUS PRN
Status: DISCONTINUED | OUTPATIENT
Start: 2024-01-10 | End: 2024-01-10 | Stop reason: SDUPTHER

## 2024-01-10 RX ORDER — HYDROMORPHONE HYDROCHLORIDE 2 MG/ML
0.5 INJECTION, SOLUTION INTRAMUSCULAR; INTRAVENOUS; SUBCUTANEOUS EVERY 5 MIN PRN
Status: DISCONTINUED | OUTPATIENT
Start: 2024-01-10 | End: 2024-01-10 | Stop reason: HOSPADM

## 2024-01-10 RX ORDER — HYDROCODONE BITARTRATE AND ACETAMINOPHEN 5; 325 MG/1; MG/1
1 TABLET ORAL EVERY 6 HOURS PRN
Qty: 12 TABLET | Refills: 0 | Status: SHIPPED | OUTPATIENT
Start: 2024-01-10 | End: 2024-01-13

## 2024-01-10 RX ORDER — SODIUM CHLORIDE, SODIUM LACTATE, POTASSIUM CHLORIDE, CALCIUM CHLORIDE 600; 310; 30; 20 MG/100ML; MG/100ML; MG/100ML; MG/100ML
INJECTION, SOLUTION INTRAVENOUS CONTINUOUS
Status: DISCONTINUED | OUTPATIENT
Start: 2024-01-10 | End: 2024-01-10 | Stop reason: HOSPADM

## 2024-01-10 RX ORDER — EPHEDRINE SULFATE/0.9% NACL/PF 50 MG/5 ML
SYRINGE (ML) INTRAVENOUS PRN
Status: DISCONTINUED | OUTPATIENT
Start: 2024-01-10 | End: 2024-01-10 | Stop reason: SDUPTHER

## 2024-01-10 RX ORDER — SODIUM CHLORIDE 0.9 % (FLUSH) 0.9 %
5-40 SYRINGE (ML) INJECTION EVERY 12 HOURS SCHEDULED
Status: DISCONTINUED | OUTPATIENT
Start: 2024-01-10 | End: 2024-01-10 | Stop reason: HOSPADM

## 2024-01-10 RX ORDER — DIPHENHYDRAMINE HYDROCHLORIDE 50 MG/ML
12.5 INJECTION INTRAMUSCULAR; INTRAVENOUS
Status: DISCONTINUED | OUTPATIENT
Start: 2024-01-10 | End: 2024-01-10 | Stop reason: HOSPADM

## 2024-01-10 RX ORDER — HALOPERIDOL 5 MG/ML
1 INJECTION INTRAMUSCULAR
Status: DISCONTINUED | OUTPATIENT
Start: 2024-01-10 | End: 2024-01-10 | Stop reason: HOSPADM

## 2024-01-10 RX ORDER — PROPOFOL 10 MG/ML
INJECTION, EMULSION INTRAVENOUS PRN
Status: DISCONTINUED | OUTPATIENT
Start: 2024-01-10 | End: 2024-01-10 | Stop reason: SDUPTHER

## 2024-01-10 RX ORDER — ONDANSETRON 2 MG/ML
INJECTION INTRAMUSCULAR; INTRAVENOUS PRN
Status: DISCONTINUED | OUTPATIENT
Start: 2024-01-10 | End: 2024-01-10 | Stop reason: SDUPTHER

## 2024-01-10 RX ORDER — SODIUM CHLORIDE 9 MG/ML
INJECTION, SOLUTION INTRAVENOUS PRN
Status: DISCONTINUED | OUTPATIENT
Start: 2024-01-10 | End: 2024-01-10 | Stop reason: HOSPADM

## 2024-01-10 RX ORDER — OXYCODONE HYDROCHLORIDE 5 MG/1
5 TABLET ORAL
Status: DISCONTINUED | OUTPATIENT
Start: 2024-01-10 | End: 2024-01-10 | Stop reason: HOSPADM

## 2024-01-10 RX ORDER — HYDROCODONE BITARTRATE AND ACETAMINOPHEN 5; 325 MG/1; MG/1
1 TABLET ORAL EVERY 6 HOURS PRN
COMMUNITY

## 2024-01-10 RX ORDER — LIDOCAINE HYDROCHLORIDE 20 MG/ML
INJECTION, SOLUTION EPIDURAL; INFILTRATION; INTRACAUDAL; PERINEURAL PRN
Status: DISCONTINUED | OUTPATIENT
Start: 2024-01-10 | End: 2024-01-10 | Stop reason: SDUPTHER

## 2024-01-10 RX ORDER — DEXAMETHASONE SODIUM PHOSPHATE 4 MG/ML
INJECTION, SOLUTION INTRA-ARTICULAR; INTRALESIONAL; INTRAMUSCULAR; INTRAVENOUS; SOFT TISSUE PRN
Status: DISCONTINUED | OUTPATIENT
Start: 2024-01-10 | End: 2024-01-10 | Stop reason: SDUPTHER

## 2024-01-10 RX ORDER — LIDOCAINE HYDROCHLORIDE 10 MG/ML
1 INJECTION, SOLUTION INFILTRATION; PERINEURAL
Status: DISCONTINUED | OUTPATIENT
Start: 2024-01-10 | End: 2024-01-10 | Stop reason: HOSPADM

## 2024-01-10 RX ADMIN — PHENYLEPHRINE HYDROCHLORIDE 100 MCG: 10 INJECTION INTRAVENOUS at 08:03

## 2024-01-10 RX ADMIN — Medication 2 G: at 07:48

## 2024-01-10 RX ADMIN — Medication 10 MG: at 08:06

## 2024-01-10 RX ADMIN — PHENYLEPHRINE HYDROCHLORIDE 100 MCG: 10 INJECTION INTRAVENOUS at 07:59

## 2024-01-10 RX ADMIN — LIDOCAINE HYDROCHLORIDE 60 MG: 20 INJECTION, SOLUTION EPIDURAL; INFILTRATION; INTRACAUDAL; PERINEURAL at 07:44

## 2024-01-10 RX ADMIN — ACETAMINOPHEN 1000 MG: 500 TABLET ORAL at 07:10

## 2024-01-10 RX ADMIN — MIDAZOLAM 2 MG: 1 INJECTION INTRAMUSCULAR; INTRAVENOUS at 07:30

## 2024-01-10 RX ADMIN — SODIUM CHLORIDE, SODIUM LACTATE, POTASSIUM CHLORIDE, AND CALCIUM CHLORIDE: 600; 310; 30; 20 INJECTION, SOLUTION INTRAVENOUS at 07:37

## 2024-01-10 RX ADMIN — PROPOFOL 150 MG: 10 INJECTION, EMULSION INTRAVENOUS at 07:44

## 2024-01-10 RX ADMIN — SODIUM CHLORIDE, POTASSIUM CHLORIDE, SODIUM LACTATE AND CALCIUM CHLORIDE 125 ML/HR: 600; 310; 30; 20 INJECTION, SOLUTION INTRAVENOUS at 07:09

## 2024-01-10 RX ADMIN — ONDANSETRON 4 MG: 2 INJECTION INTRAMUSCULAR; INTRAVENOUS at 07:48

## 2024-01-10 RX ADMIN — APREPITANT 40 MG: 40 CAPSULE ORAL at 07:20

## 2024-01-10 RX ADMIN — FENTANYL CITRATE 50 MCG: 50 INJECTION, SOLUTION INTRAMUSCULAR; INTRAVENOUS at 07:48

## 2024-01-10 RX ADMIN — DEXAMETHASONE SODIUM PHOSPHATE 4 MG: 4 INJECTION INTRA-ARTICULAR; INTRALESIONAL; INTRAMUSCULAR; INTRAVENOUS; SOFT TISSUE at 07:48

## 2024-01-10 RX ADMIN — PHENYLEPHRINE HYDROCHLORIDE 100 MCG: 10 INJECTION INTRAVENOUS at 07:57

## 2024-01-10 ASSESSMENT — PAIN DESCRIPTION - ORIENTATION: ORIENTATION: RIGHT

## 2024-01-10 ASSESSMENT — PAIN DESCRIPTION - PAIN TYPE: TYPE: SURGICAL PAIN

## 2024-01-10 ASSESSMENT — PAIN DESCRIPTION - DESCRIPTORS
DESCRIPTORS: ACHING
DESCRIPTORS: ACHING

## 2024-01-10 ASSESSMENT — PAIN DESCRIPTION - LOCATION: LOCATION: FLANK

## 2024-01-10 ASSESSMENT — PAIN - FUNCTIONAL ASSESSMENT: PAIN_FUNCTIONAL_ASSESSMENT: 0-10

## 2024-01-10 NOTE — ANESTHESIA PROCEDURE NOTES
Airway  Date/Time: 1/10/2024 7:46 AM  Urgency: elective    Airway not difficult    General Information and Staff    Patient location during procedure: OR  Resident/CRNA: Negar Banuelos APRN - CRNA  Performed: resident/CRNA   Performed by: Negar Banuelos APRN - CRNA  Authorized by: Negar Banuelos APRN - CRNA      Indications and Patient Condition  Indications for airway management: anesthesia  Spontaneous Ventilation: absent  Sedation level: deep  Preoxygenated: yes  Patient position: sniffing  Mask difficulty assessment: not attempted    Final Airway Details  Final airway type: supraglottic airway      Successful airway: oropharyngeal  Size 4     Number of attempts at approach: 1  Ventilation between attempts: supraglottic airway  Number of other approaches attempted: 0    Additional Comments  Atraumatic. Oral structures and dentition unchanged.

## 2024-01-10 NOTE — ANESTHESIA POSTPROCEDURE EVALUATION
Department of Anesthesiology  Postprocedure Note    Patient: Amada Rico  MRN: [id number]  YOB: 1975  Date of evaluation: 1/10/2024    Procedure Summary       Date: 01/10/24 Room / Location: [facility] MAIN OR  CYSTO / D MAIN OR    Anesthesia Start: 0730 Anesthesia Stop: 0818    Procedure: CYSTOSCOPY, RIGHT  URETEROSCOPY, LASER LITHOTRIPSY, RIGHT URETERAL STENT EXCHANGE (Right: Ureter) Diagnosis:       Ureteral stone      (Ureteral stone [N20.1])    Providers: Bib Marcum MD Responsible Provider: Judith Murdock MD    Anesthesia Type: General ASA Status: 3            Anesthesia Type: General    Kan Phase I: Kan Score: 8    Kan Phase II: Kan Score: 10    Anesthesia Post Evaluation    Patient location during evaluation: PACU  Patient participation: complete - patient participated  Level of consciousness: awake and alert  Airway patency: patent  Nausea: well controlled.  Cardiovascular status: acceptable.  Respiratory status: acceptable  Hydration status: stable  Pain management: adequate    No notable events documented.

## 2024-01-10 NOTE — H&P
HISTORY AND PHYSICAL             Date: 1/10/2024        Patient Name: Amada Rico     YOB: 1975      Age:  48 y.o.      History of Present Illness     48 y.o.   female  with h/o stones and uti's presented to [facility] 12/27/23 with 5 mm R distal ureteral stone and uti.  R stent was placed by Dr. Clarke and she returns today for management of her stone.    Past Medical History     Past Medical History:   Diagnosis Date    Anxiety     managed with medication     BRCA gene mutation positive in female     Cystostomy status ([facility]) 01/17/2023    Difficult intubation     12/21/16 sore throat and ulcers after intubation     Essential hypertension 11/04/2016    only when septic in 10/2016, takes atenolol for HR    GERD (gastroesophageal reflux disease)     manage with medication    Gram positive septicemia ([facility]) 10/27/2016    Hiatal hernia     History of kidney stones     numerous    Kidney stone     left side    Left ureteral stone 09/13/2017    Midline cystocele 02/22/2016    Mucositis due to chemotherapy 09/08/2019    Formatting of this note might be different from the original. Sore on L lateral tongue consistent with mucositis from chemo. She had this with her prior cycle Chloraseptic spray ordered    Last Assessment & Plan:  Formatting of this note might be different from the original. Chloraseptic spray prn and orajel    Nausea & vomiting     nausea after anesthesia    Neutropenic fever ([facility]) 09/08/2019    Formatting of this note might be different from the original. - Patient s/p cycle 2 of carbo/taxol on 8/27 presenting with fever and , demonstrating severe neutropenia.  - Febrile to 101.4 in the ED. Lactate 1.3.  - CXR, UA, and influenza screening negative. No clear source of infection at this time. No localizing symptoms other than dry cough. Blood cultures pending. Respiratory culture to    Ovarian ca ([facility])     both ovaries removed, also had 3 rounds of chemo    Paroxysmal SVT

## 2024-01-10 NOTE — DISCHARGE INSTRUCTIONS
birth control. In addition to this, we recommend continuing your oral contraceptive as prescribed, unless otherwise instructed by your physician, during this time.    Follow-up care is a key part of your treatment and safety. Be sure to make and go to all appointments, and call your doctor if you are having problems. It's also a good idea to know your test results and keep a list of the medicines you take.  When should you call for help?  Call 911 anytime you think you may need emergency care. For example, call if:  You passed out (lost consciousness).  You have severe trouble breathing.  You have sudden chest pain and shortness of breath, or you cough up blood.  You have severe belly pain.  Call your doctor now or seek immediate medical care if:  You are sick to your stomach or cannot keep fluids down.  Your urine is still red or you see blood clots after you have urinated several times.  You have trouble passing urine or stool, especially if you have pain or swelling in your lower belly.  You have signs of a blood clot, such as:  Pain in your calf, back of the knee, thigh, or groin.  Redness and swelling in your leg or groin.  You develop a fever or severe chills.  You have pain in your back just below your rib cage. This is called flank pain.  Watch closely for changes in your health, and be sure to contact your doctor if:  A burning feeling is normal for a day or two after the test, but call if it does not get better.  You have a frequent urge to urinate but can pass only small amounts of urine.   It is normal for the urine to have a pinkish color for a few days after the test, but call if it does not get better or if your urine is cloudy, smells bad, or has pus in it.    After general anesthesia or intravenous sedation, for 24 hours or while taking prescription Narcotics:  Limit your activities  A responsible adult needs to be with you for the next 24 hours  Do not drive and operate hazardous machinery  Do not

## 2024-01-10 NOTE — BRIEF OP NOTE
Brief Postoperative Note      Patient: Amada Rico  YOB: 1975  MRN: [id number]    Date of Procedure: 1/10/2024    Pre-Op Diagnosis Codes:     * Ureteral stone [N20.1]    Post-Op Diagnosis: Same       Procedure(s):  CYSTOSCOPY, RIGHT  URETEROSCOPY, LASER LITHOTRIPSY, RIGHT URETERAL STENT EXCHANGE    Surgeon(s):  Freida Marcum MD    Assistant:  * No surgical staff found *    Anesthesia: General    Estimated Blood Loss (mL): Minimal    Complications: None    Specimens:   * No specimens in log *    Implants:  Implant Name Type Inv. Item Serial No.  Lot No. LRB No. Used Action   STENT URET 4.8FR L22CM PERCFLX + HYDR+ FIRM DUROMETER DB - [id number]  STENT URET 4.8FR L22CM PERCFLX + HYDR+ FIRM DUROMETER DBL  [employer] [id number] Right 1 Implanted         Drains: * No LDAs found *    Findings: see dictation      Electronically signed by FREIDA MARCUM MD on 1/10/2024 at 8:19 AM

## 2024-01-10 NOTE — ANESTHESIA PRE PROCEDURE
BUN 6 12/30/2023 06:00 AM    CREATININE 0.70 12/30/2023 06:00 AM    GFRAA >60 04/03/2022 08:48 PM    AGRATIO 1.1 04/03/2022 08:48 PM    LABGLOM >60 12/30/2023 06:00 AM    GLUCOSE 100 12/30/2023 06:00 AM    PROT 6.2 12/30/2023 06:00 AM    CALCIUM 8.3 12/30/2023 06:00 AM    BILITOT 0.2 12/30/2023 06:00 AM    ALKPHOS 78 12/30/2023 06:00 AM    ALKPHOS 123 04/03/2022 08:48 PM    AST 17 12/30/2023 06:00 AM    ALT 18 12/30/2023 06:00 AM       POC Tests: No results for input(s): \"POCGLU\", \"POCNA\", \"POCK\", \"POCCL\", \"POCBUN\", \"POCHEMO\", \"POCHCT\" in the last 72 hours.    Coags: No results found for: \"PROTIME\", \"INR\", \"APTT\"    HCG (If Applicable): No results found for: \"PREGTESTUR\", \"PREGSERUM\", \"HCG\", \"HCGQUANT\"     ABGs: No results found for: \"PHART\", \"PO2ART\", \"REE8VQQ\", \"RDR4DVF\", \"BEART\", \"I1IOXJPH\"     Type & Screen (If Applicable):  No results found for: \"LABABO\", \"LABRH\"    Drug/Infectious Status (If Applicable):  No results found for: \"HIV\", \"HEPCAB\"    COVID-19 Screening (If Applicable):   Lab Results   Component Value Date/Time    COVID19 Not detected 04/03/2022 08:47 PM           Anesthesia Evaluation  Patient summary reviewed and Nursing notes reviewed   history of anesthetic complications (H/o sore throats and ulcers in past with ETT): PONV.  Airway: Mallampati: II  TM distance: >3 FB   Neck ROM: full  Mouth opening: > = 3 FB   Dental: normal exam         Pulmonary: breath sounds clear to auscultation  (+)    recent URI:                                   Cardiovascular:  Exercise tolerance: good (>4 METS)  (+) hypertension:, dysrhythmias (on atenolol): SVT, hyperlipidemia        Rhythm: regular  Rate: normal                    Neuro/Psych:   (+) depression/anxiety             GI/Hepatic/Renal:   (+) hiatal hernia, GERD: well controlled, renal disease: kidney stones         ROS comment: Recent admit 12/27-30/2023 for urosepsis from infected kidney stone.   Endo/Other:    (+) blood dyscrasia: anemia and

## 2024-01-10 NOTE — OP NOTE
[facility]  OPERATIVE REPORT    Name:  HAIM FERRIS  MR#:  [id number]  :  1975  ACCOUNT #:  [id number]  DATE OF SERVICE:  01/10/2024    PREOPERATIVE DIAGNOSIS:  A 5 mm right distal ureteral stone.    POSTOPERATIVE DIAGNOSIS:  A 5 mm right distal ureteral stone.    PROCEDURES PERFORMED:  Cystourethroscopy, right ureteroscopy, laser lithotripsy and right ureteral stent exchange.    SURGEON:  Bib Marcum MD    ASSISTANT:  None.    ANESTHESIA:  General.    COMPLICATIONS:  None apparent.    SPECIMENS REMOVED:  None.    IMPLANTS:  Right double-J ureteral stent.    ESTIMATED BLOOD LOSS:  Minimal.    FINDINGS:  The stone was able to be fragmented with all fragments removed to the level of the bladder such that the ureter was stone-free after today's procedure.    INDICATIONS:  This is a 48-year-old female with a longstanding history of stones and septic episodes from obstructing stones, who had a stent placed on  by my partner Dr. Clarke for UTI and distal stone and after recovering from her infection she returns today for management of the stone.    TECHNIQUE:  The patient was taken to operating room, placed in supine position.  Anesthesia was induced via Anesthesiology service.  She was repositioned in the lithotomy position and prepped and draped in sterile surgical fashion with the genitalia in sterile field.  A 22-Micronesian rigid cystoscope was introduced atraumatically via the urethra.  Pancystoscopy revealed no pathology.  I did grasp the end of the stent and bring it external to the urethral meatus.  I advanced the sensor wire up the stent before removing it.  I then used the rigid ureteroscope, entered the bladder and entered the right ureteral orifice, and the stone was immediately evident.  A 365 micron holmium laser fiber at settings of 1 joule and 8 Hz was used to break the stone into five to six fragments and a Jin basket was then used to sweep these to the level

## 2024-01-15 ENCOUNTER — OFFICE VISIT (OUTPATIENT)
Dept: UROLOGY | Age: 49
End: 2024-01-15
Payer: MEDICAID

## 2024-01-15 DIAGNOSIS — N20.0 KIDNEY STONE: ICD-10-CM

## 2024-01-15 DIAGNOSIS — N20.1 URETERAL STONE: Primary | ICD-10-CM

## 2024-01-15 LAB
BILIRUBIN, URINE, POC: NEGATIVE
BLOOD URINE, POC: NORMAL
GLUCOSE URINE, POC: NEGATIVE
KETONES, URINE, POC: NEGATIVE
LEUKOCYTE ESTERASE, URINE, POC: NORMAL
NITRITE, URINE, POC: POSITIVE
PH, URINE, POC: 6.5 (ref 4.6–8)
PROTEIN,URINE, POC: 300
SPECIFIC GRAVITY, URINE, POC: 1.03 (ref 1–1.03)
URINALYSIS CLARITY, POC: NORMAL
URINALYSIS COLOR, POC: NORMAL
UROBILINOGEN, POC: NORMAL

## 2024-01-15 PROCEDURE — 74018 RADEX ABDOMEN 1 VIEW: CPT | Performed by: NURSE PRACTITIONER

## 2024-01-15 PROCEDURE — 99214 OFFICE O/P EST MOD 30 MIN: CPT | Performed by: NURSE PRACTITIONER

## 2024-01-15 PROCEDURE — 81003 URINALYSIS AUTO W/O SCOPE: CPT | Performed by: NURSE PRACTITIONER

## 2024-01-15 RX ORDER — POTASSIUM CITRATE 5 MEQ/1
5 TABLET, EXTENDED RELEASE ORAL 2 TIMES DAILY
Qty: 60 TABLET | Refills: 5 | Status: SHIPPED | OUTPATIENT
Start: 2024-01-15

## 2024-01-15 ASSESSMENT — ENCOUNTER SYMPTOMS: BACK PAIN: 0

## 2024-01-15 NOTE — PROGRESS NOTES
[facility]  [address] [place] [id number]  [phone]          Amada Rico  : 1975    Chief Complaint   Patient presents with    Follow-up    Nephrolithiasis          HPI     Amada Rico is a 48 y.o. female with a longstanding history of stones and septic episodes from obstructing stones. The first was in .        The 2nd was in  and was due to a 5 mm L ureteral stone.  Stent was placed emergently 17.  Blood culture/urine culture returned Enterococcus.  ID was involved and she is still on IV PCN.  cystoscopy, LEFT ureteroscopy, laser lithotripsy, LEFT ureteral stent exchange was performed 17.        She also has had an issue with recurrent UTI's since sling placement in .        She underwent cystoscopy, LEFT ureteroscopy, laser lithotripsy, LEFT ureteral stent placement by Dr. Malone 10/21/17.      24 hour urorisk was performed in .  PTH and serum Ca were normal.  UOP was 1600 ml and citrate levels were low.  She began urocitK 10 meq bid and increased fluid intake.  Repeat 24 hour urorisk revealed UOP increase to 2.7L daily AND citrate was normalized to 567 from 248!      She underwent L ESWL 18 and did pass fragments.      KUB at visit in  revealed a new 2.5 cm R renal pelvis stone and 15 mm LLP renal stone.  PTH and Calcium levels were both rechecked and normal.   She underwent R PCNL 19 and L ureteroscopy 19.       She has since been diagnosed with ovarian cancer and underwent first B oophorectomy and then full hysterectomy.  Hysterectomy was on 19.  She had chemotherapy and B prophylactic mastectomy.   She is followed by Dr. Oliver, Oncology and was cancer free by Pet Scan in .       In regards to her stones, she has seen Dr. Covarrubias at [facility].  KUB revealed a 12 mm LLP renal stone and 3 mm R renal stone.  She underwent  L PCNL at [facility] in .      Recurrent UTI's have been an issue 
64.1

## 2024-01-24 ENCOUNTER — TELEPHONE (OUTPATIENT)
Dept: UROLOGY | Age: 49
End: 2024-01-24

## 2024-01-24 NOTE — TELEPHONE ENCOUNTER
----- Message from Bib Marcum MD sent at 1/24/2024  9:21 AM EST -----  Regarding: today not needed  Keep 4/24 visit with Mariluz.  Doesn't need to be seen today or need a kub this soon.

## 2024-03-05 ENCOUNTER — TELEPHONE (OUTPATIENT)
Dept: UROLOGY | Age: 49
End: 2024-03-05

## 2024-03-05 ENCOUNTER — OFFICE VISIT (OUTPATIENT)
Dept: UROLOGY | Age: 49
End: 2024-03-05
Payer: MEDICAID

## 2024-03-05 DIAGNOSIS — N39.0 URINARY TRACT INFECTION WITHOUT HEMATURIA, SITE UNSPECIFIED: Primary | ICD-10-CM

## 2024-03-05 DIAGNOSIS — N39.0 URINARY TRACT INFECTION WITHOUT HEMATURIA, SITE UNSPECIFIED: ICD-10-CM

## 2024-03-05 LAB
BILIRUBIN, URINE, POC: NEGATIVE
BLOOD URINE, POC: NORMAL
GLUCOSE URINE, POC: NEGATIVE
KETONES, URINE, POC: NEGATIVE
LEUKOCYTE ESTERASE, URINE, POC: NORMAL
NITRITE, URINE, POC: NEGATIVE
PH, URINE, POC: 6.5 (ref 4.6–8)
PROTEIN,URINE, POC: 30
SPECIFIC GRAVITY, URINE, POC: 1.02 (ref 1–1.03)
URINALYSIS CLARITY, POC: NORMAL
URINALYSIS COLOR, POC: NORMAL
UROBILINOGEN, POC: NORMAL

## 2024-03-05 PROCEDURE — 81003 URINALYSIS AUTO W/O SCOPE: CPT | Performed by: NURSE PRACTITIONER

## 2024-03-05 RX ORDER — NITROFURANTOIN 25; 75 MG/1; MG/1
100 CAPSULE ORAL 2 TIMES DAILY
Qty: 14 CAPSULE | Refills: 0 | Status: SHIPPED | OUTPATIENT
Start: 2024-03-05 | End: 2024-03-12

## 2024-03-05 NOTE — PROGRESS NOTES
SX:Freq,Urgency and dysuria  Results for orders placed or performed in visit on 03/05/24   AMB POC URINALYSIS DIP STICK AUTO W/O MICRO   Result Value Ref Range    Color (UA POC)      Clarity (UA POC)      Glucose, Urine, POC Negative     Bilirubin, Urine, POC Negative     KETONES, Urine, POC Negative     Specific Gravity, Urine, POC 1.025 1.001 - 1.035    Blood (UA POC) Trace-intact     pH, Urine, POC 6.5 4.6 - 8.0    Protein, Urine, POC 30     Urobilinogen, POC 0.2 mg/dL     Nitrite, Urine, POC Negative     Leukocyte Esterase, Urine, POC Small      Send culture. Will begin macrobid. Results will be called.   Mariluz Blue, APRN - CNP

## 2024-03-07 LAB
BACTERIA SPEC CULT: NORMAL
BACTERIA SPEC CULT: NORMAL
SERVICE CMNT-IMP: NORMAL

## 2025-05-15 NOTE — PROGRESS NOTES
IR Nurse Pre-Procedure Checklist Part 2    Patient in IR suite at [id number]      Consent signed: Yes    H&P complete:  Yes    Antibiotics: Yes    Airway/Mallampati Done: Pending now completed 0820    Shaved: Not applicable    Pregnancy Form:Yes    Patient Position: Yes    MD Side: Yes     Biopsy Worksheet: Not applicable    Specimen Medium: Not applicable fair, will monitor progress closely good, to achieve stated therapy goals

## (undated) DEVICE — WATER 50W MAX / AIR 8W MAX: Brand: FLEXIVA TRACTIP

## (undated) DEVICE — BAG DRNGE 4000ML CONT IRRIG ROUNDED TEARDROP SHP DISP

## (undated) DEVICE — KENDALL SCD EXPRESS SLEEVES, KNEE LENGTH, MEDIUM: Brand: KENDALL SCD

## (undated) DEVICE — CYSTO/BLADDER IRRIGATION SET, REGULATING CLAMP

## (undated) DEVICE — INFLATION DEVICE: Brand: ENCORE™ 26

## (undated) DEVICE — MEDI-VAC NON-CONDUCTIVE SUCTION TUBING: Brand: CARDINAL HEALTH

## (undated) DEVICE — DRAPE XR C ARM 41X74IN LF --

## (undated) DEVICE — SOLUTION IRRIG 3000ML H2O STRL BAG

## (undated) DEVICE — TRAY PREP DRY W/ PREM GLV 2 APPL 6 SPNG 2 UNDPD 1 OVERWRAP

## (undated) DEVICE — SURGICAL PROCEDURE PACK BASIC ST FRANCIS

## (undated) DEVICE — ENDOSCOPIC VALVE WITH ADAPTER.: Brand: SURSEAL® II

## (undated) DEVICE — SOLUTION IRRIG 1000ML H2O STRL BLT

## (undated) DEVICE — SYR LR LCK 1ML GRAD NSAF 30ML --

## (undated) DEVICE — Device

## (undated) DEVICE — PACK SURGICAL PROCEDURE KIT CYSTOSCOPY TOTE

## (undated) DEVICE — DILATOR/SHEATH SET: Brand: 8/10 DILATOR/SHEATH SET

## (undated) DEVICE — Y-TYPE TUR/BLADDER IRRIGATION SET, REGULATING CLAMP

## (undated) DEVICE — DEVICE IRRIG TBNG L10IN 30ML POLYVI BLB LUERLOCK FLO CTRL

## (undated) DEVICE — SUTURE ETHLN SZ 2-0 L18IN NONABSORBABLE BLK L26MM PS 3/8 585H

## (undated) DEVICE — GOWN,REINFORCED,POLY,AURORA,XXLARGE,STR: Brand: MEDLINE

## (undated) DEVICE — GDWIRE 3CM FLX-TIP 0.038X150CM -- BX/5 SENSOR

## (undated) DEVICE — GUIDEWIRE ENDOSCP L150CM DIA0.038IN TIP L3CM PTFE FLX STR

## (undated) DEVICE — GARMENT,MEDLINE,DVT,INT,CALF,MED, GEN2: Brand: MEDLINE

## (undated) DEVICE — CATH URETH FOL 2W SH 20FRX5ML --

## (undated) DEVICE — CYSTO: Brand: MEDLINE INDUSTRIES, INC.

## (undated) DEVICE — SOLUTION IRRIG 3000ML 0.9% SOD CHL FLX CONT 0797208] ICU MEDICAL INC]

## (undated) DEVICE — DEVICE SECUREMENT AD W/ TRICOT ANCHR PD FOR F LTX SIL CATH

## (undated) DEVICE — NITINOL STONE RETRIEVAL DEVICE: Brand: DAKOTA

## (undated) DEVICE — GUIDEWIRE UROLOGICAL STR 3 CM 0.038 INX150 CM DUAL-FLEX

## (undated) DEVICE — FOLEY TRAY DRAINAGE BG 16 FR ANTI REFLX CHMBR COMPLETE CARE

## (undated) DEVICE — DRAPE PT ISOLATN 130 IN X 96 IN

## (undated) DEVICE — PROBE LITHO 12FR L3.76MM DISP SHOCKPULSE

## (undated) DEVICE — HIGH PRESSURE NEPHROSTOMY BALLOON CATHETER: Brand: NEPHROMAX

## (undated) DEVICE — GUIDEWIRE URO L150CM DIA0.038IN TIP L3CM STR FLX TIP DISP

## (undated) DEVICE — GOWN,PREVENTION PLUS,2XL,ST,22/CS: Brand: MEDLINE

## (undated) DEVICE — (D)PREP SKN CHLRAPRP APPL 26ML -- CONVERT TO ITEM 371833

## (undated) DEVICE — JELLY LUBRICATING 10GM PREFIL SYR LUBE

## (undated) DEVICE — GUIDEWIRE URO L145CM DIA0.035IN TIP L7CM S STL PTFE BENT

## (undated) DEVICE — GLOVE ORANGE PI 8 1/2   MSG9085

## (undated) DEVICE — SYR IRR CATH TIP LR ADPT 70ML -- CONVERT TO ITEM 363120

## (undated) DEVICE — TRAY CATH 16F DRN BG LTX -- CONVERT TO ITEM 363158

## (undated) DEVICE — GOWN,SIRUS,NONRNF,SETINSLV,2XL,18/CS: Brand: MEDLINE

## (undated) DEVICE — PAIRED WIRE HELICAL STONE RETRIEVAL BASKET: Brand: GEMINI

## (undated) DEVICE — PTFE WIRE GUIDE WITH 3CM FLEXIBLE TIP: Brand: COOK